# Patient Record
Sex: FEMALE | Race: WHITE | NOT HISPANIC OR LATINO | Employment: FULL TIME | URBAN - METROPOLITAN AREA
[De-identification: names, ages, dates, MRNs, and addresses within clinical notes are randomized per-mention and may not be internally consistent; named-entity substitution may affect disease eponyms.]

---

## 2017-05-19 ENCOUNTER — APPOINTMENT (OUTPATIENT)
Dept: CT IMAGING | Facility: HOSPITAL | Age: 42
End: 2017-05-19

## 2017-05-19 ENCOUNTER — HOSPITAL ENCOUNTER (INPATIENT)
Facility: HOSPITAL | Age: 42
LOS: 3 days | Discharge: HOME | End: 2017-05-22
Attending: EMERGENCY MEDICINE | Admitting: COLON & RECTAL SURGERY

## 2017-05-19 DIAGNOSIS — K50.912: ICD-10-CM

## 2017-05-19 DIAGNOSIS — K56.609 SMALL BOWEL OBSTRUCTION (HCC): Primary | ICD-10-CM

## 2017-05-19 LAB
ALBUMIN SERPL-MCNC: 4.4 G/DL (ref 3.2–4.8)
ALBUMIN/GLOB SERPL: 1.3 G/DL (ref 1.5–2.5)
ALP SERPL-CCNC: 55 U/L (ref 25–100)
ALT SERPL W P-5'-P-CCNC: 82 U/L (ref 7–40)
ANION GAP SERPL CALCULATED.3IONS-SCNC: 11 MMOL/L (ref 3–11)
AST SERPL-CCNC: 95 U/L (ref 0–33)
B-HCG UR QL: NEGATIVE
BACTERIA UR QL AUTO: ABNORMAL /HPF
BASOPHILS # BLD AUTO: 0.01 10*3/MM3 (ref 0–0.2)
BASOPHILS NFR BLD AUTO: 0.2 % (ref 0–1)
BILIRUB SERPL-MCNC: 0.5 MG/DL (ref 0.3–1.2)
BILIRUB UR QL STRIP: NEGATIVE
BUN BLD-MCNC: 14 MG/DL (ref 9–23)
BUN/CREAT SERPL: 23.3 (ref 7–25)
CALCIUM SPEC-SCNC: 10.6 MG/DL (ref 8.7–10.4)
CHLORIDE SERPL-SCNC: 106 MMOL/L (ref 99–109)
CLARITY UR: CLEAR
CO2 SERPL-SCNC: 23 MMOL/L (ref 20–31)
COLOR UR: YELLOW
CREAT BLD-MCNC: 0.6 MG/DL (ref 0.6–1.3)
CRP SERPL-MCNC: 2.28 MG/DL (ref 0–1)
DEPRECATED RDW RBC AUTO: 48.8 FL (ref 37–54)
EOSINOPHIL # BLD AUTO: 0.01 10*3/MM3 (ref 0.1–0.3)
EOSINOPHIL NFR BLD AUTO: 0.2 % (ref 0–3)
ERYTHROCYTE [DISTWIDTH] IN BLOOD BY AUTOMATED COUNT: 13.2 % (ref 11.3–14.5)
ERYTHROCYTE [SEDIMENTATION RATE] IN BLOOD: 17 MM/HR (ref 0–20)
GFR SERPL CREATININE-BSD FRML MDRD: 109 ML/MIN/1.73
GLOBULIN UR ELPH-MCNC: 3.3 GM/DL
GLUCOSE BLD-MCNC: 106 MG/DL (ref 70–100)
GLUCOSE UR STRIP-MCNC: NEGATIVE MG/DL
HCT VFR BLD AUTO: 46.8 % (ref 34.5–44)
HGB BLD-MCNC: 15.2 G/DL (ref 11.5–15.5)
HGB UR QL STRIP.AUTO: NEGATIVE
HOLD SPECIMEN: NORMAL
HOLD SPECIMEN: NORMAL
HYALINE CASTS UR QL AUTO: ABNORMAL /LPF
IMM GRANULOCYTES # BLD: 0.01 10*3/MM3 (ref 0–0.03)
IMM GRANULOCYTES NFR BLD: 0.2 % (ref 0–0.6)
INTERNAL NEGATIVE CONTROL: NEGATIVE
INTERNAL POSITIVE CONTROL: POSITIVE
KETONES UR QL STRIP: ABNORMAL
LEUKOCYTE ESTERASE UR QL STRIP.AUTO: ABNORMAL
LIPASE SERPL-CCNC: 56 U/L (ref 6–51)
LYMPHOCYTES # BLD AUTO: 0.4 10*3/MM3 (ref 0.6–4.8)
LYMPHOCYTES NFR BLD AUTO: 8.3 % (ref 24–44)
Lab: NORMAL
MCH RBC QN AUTO: 32.9 PG (ref 27–31)
MCHC RBC AUTO-ENTMCNC: 32.5 G/DL (ref 32–36)
MCV RBC AUTO: 101.3 FL (ref 80–99)
MONOCYTES # BLD AUTO: 0.44 10*3/MM3 (ref 0–1)
MONOCYTES NFR BLD AUTO: 9.1 % (ref 0–12)
NEUTROPHILS # BLD AUTO: 3.95 10*3/MM3 (ref 1.5–8.3)
NEUTROPHILS NFR BLD AUTO: 82 % (ref 41–71)
NITRITE UR QL STRIP: NEGATIVE
PH UR STRIP.AUTO: <=5 [PH] (ref 5–8)
PLATELET # BLD AUTO: 151 10*3/MM3 (ref 150–450)
PMV BLD AUTO: 10.1 FL (ref 6–12)
POTASSIUM BLD-SCNC: 4.3 MMOL/L (ref 3.5–5.5)
PROT SERPL-MCNC: 7.7 G/DL (ref 5.7–8.2)
PROT UR QL STRIP: NEGATIVE
RBC # BLD AUTO: 4.62 10*6/MM3 (ref 3.89–5.14)
RBC # UR: ABNORMAL /HPF
REF LAB TEST METHOD: ABNORMAL
SODIUM BLD-SCNC: 140 MMOL/L (ref 132–146)
SP GR UR STRIP: 1.02 (ref 1–1.03)
SQUAMOUS #/AREA URNS HPF: ABNORMAL /HPF
UROBILINOGEN UR QL STRIP: ABNORMAL
WBC NRBC COR # BLD: 4.82 10*3/MM3 (ref 3.5–10.8)
WBC UR QL AUTO: ABNORMAL /HPF
WHOLE BLOOD HOLD SPECIMEN: NORMAL
WHOLE BLOOD HOLD SPECIMEN: NORMAL

## 2017-05-19 PROCEDURE — 85652 RBC SED RATE AUTOMATED: CPT | Performed by: COLON & RECTAL SURGERY

## 2017-05-19 PROCEDURE — 25010000002 HEPARIN (PORCINE) PER 1000 UNITS: Performed by: COLON & RECTAL SURGERY

## 2017-05-19 PROCEDURE — 74176 CT ABD & PELVIS W/O CONTRAST: CPT

## 2017-05-19 PROCEDURE — 25010000002 DIAZEPAM PER 5 MG: Performed by: COLON & RECTAL SURGERY

## 2017-05-19 PROCEDURE — 87086 URINE CULTURE/COLONY COUNT: CPT | Performed by: EMERGENCY MEDICINE

## 2017-05-19 PROCEDURE — 80053 COMPREHEN METABOLIC PANEL: CPT | Performed by: EMERGENCY MEDICINE

## 2017-05-19 PROCEDURE — 86140 C-REACTIVE PROTEIN: CPT | Performed by: COLON & RECTAL SURGERY

## 2017-05-19 PROCEDURE — 99284 EMERGENCY DEPT VISIT MOD MDM: CPT

## 2017-05-19 PROCEDURE — 81001 URINALYSIS AUTO W/SCOPE: CPT | Performed by: EMERGENCY MEDICINE

## 2017-05-19 PROCEDURE — 83690 ASSAY OF LIPASE: CPT | Performed by: EMERGENCY MEDICINE

## 2017-05-19 PROCEDURE — 25010000002 HYDROMORPHONE PER 4 MG: Performed by: EMERGENCY MEDICINE

## 2017-05-19 PROCEDURE — 25010000002 MORPHINE SULFATE (PF) 2 MG/ML SOLUTION: Performed by: COLON & RECTAL SURGERY

## 2017-05-19 PROCEDURE — 85025 COMPLETE CBC W/AUTO DIFF WBC: CPT | Performed by: EMERGENCY MEDICINE

## 2017-05-19 PROCEDURE — 87147 CULTURE TYPE IMMUNOLOGIC: CPT | Performed by: EMERGENCY MEDICINE

## 2017-05-19 PROCEDURE — 25010000002 ONDANSETRON PER 1 MG: Performed by: EMERGENCY MEDICINE

## 2017-05-19 RX ORDER — METOPROLOL SUCCINATE 25 MG/1
25 TABLET, EXTENDED RELEASE ORAL DAILY
COMMUNITY

## 2017-05-19 RX ORDER — ACETAMINOPHEN 500 MG
1000 TABLET ORAL 3 TIMES DAILY
Status: DISCONTINUED | OUTPATIENT
Start: 2017-05-19 | End: 2017-05-22 | Stop reason: HOSPADM

## 2017-05-19 RX ORDER — CETIRIZINE HYDROCHLORIDE 10 MG/1
10 TABLET ORAL DAILY
COMMUNITY
End: 2018-08-30

## 2017-05-19 RX ORDER — ESCITALOPRAM OXALATE 10 MG/1
10 TABLET ORAL DAILY
COMMUNITY

## 2017-05-19 RX ORDER — DIAZEPAM 5 MG/1
5 TABLET ORAL EVERY 6 HOURS PRN
Status: DISCONTINUED | OUTPATIENT
Start: 2017-05-19 | End: 2017-05-22 | Stop reason: HOSPADM

## 2017-05-19 RX ORDER — DIAZEPAM 5 MG/ML
5 INJECTION, SOLUTION INTRAMUSCULAR; INTRAVENOUS ONCE
Status: DISCONTINUED | OUTPATIENT
Start: 2017-05-19 | End: 2017-05-19

## 2017-05-19 RX ORDER — RANITIDINE 150 MG/1
150 TABLET ORAL 2 TIMES DAILY
COMMUNITY
End: 2018-08-30

## 2017-05-19 RX ORDER — ONDANSETRON 2 MG/ML
4 INJECTION INTRAMUSCULAR; INTRAVENOUS ONCE
Status: COMPLETED | OUTPATIENT
Start: 2017-05-19 | End: 2017-05-19

## 2017-05-19 RX ORDER — LABETALOL 100 MG/1
100 TABLET, FILM COATED ORAL 2 TIMES DAILY
COMMUNITY
End: 2017-10-06

## 2017-05-19 RX ORDER — BUDESONIDE 3 MG/1
9 CAPSULE, COATED PELLETS ORAL DAILY
Status: DISCONTINUED | OUTPATIENT
Start: 2017-05-19 | End: 2017-05-21

## 2017-05-19 RX ORDER — SODIUM CHLORIDE 0.9 % (FLUSH) 0.9 %
10 SYRINGE (ML) INJECTION AS NEEDED
Status: DISCONTINUED | OUTPATIENT
Start: 2017-05-19 | End: 2017-05-22 | Stop reason: HOSPADM

## 2017-05-19 RX ORDER — MAGNESIUM HYDROXIDE/ALUMINUM HYDROXICE/SIMETHICONE 120; 1200; 1200 MG/30ML; MG/30ML; MG/30ML
10 SUSPENSION ORAL ONCE
Status: DISCONTINUED | OUTPATIENT
Start: 2017-05-19 | End: 2017-05-19

## 2017-05-19 RX ORDER — ONDANSETRON 2 MG/ML
4 INJECTION INTRAMUSCULAR; INTRAVENOUS EVERY 6 HOURS PRN
Status: DISCONTINUED | OUTPATIENT
Start: 2017-05-19 | End: 2017-05-22 | Stop reason: HOSPADM

## 2017-05-19 RX ORDER — HEPARIN SODIUM 5000 [USP'U]/ML
5000 INJECTION, SOLUTION INTRAVENOUS; SUBCUTANEOUS EVERY 8 HOURS SCHEDULED
Status: DISCONTINUED | OUTPATIENT
Start: 2017-05-19 | End: 2017-05-22 | Stop reason: HOSPADM

## 2017-05-19 RX ORDER — DIAZEPAM 5 MG/ML
5 INJECTION, SOLUTION INTRAMUSCULAR; INTRAVENOUS ONCE AS NEEDED
Status: COMPLETED | OUTPATIENT
Start: 2017-05-19 | End: 2017-05-19

## 2017-05-19 RX ORDER — MAGNESIUM HYDROXIDE/ALUMINUM HYDROXICE/SIMETHICONE 120; 1200; 1200 MG/30ML; MG/30ML; MG/30ML
15 SUSPENSION ORAL EVERY 6 HOURS PRN
COMMUNITY
End: 2017-10-06

## 2017-05-19 RX ORDER — SODIUM CHLORIDE 9 MG/ML
100 INJECTION, SOLUTION INTRAVENOUS CONTINUOUS
Status: DISCONTINUED | OUTPATIENT
Start: 2017-05-19 | End: 2017-05-21

## 2017-05-19 RX ORDER — MORPHINE SULFATE 2 MG/ML
2 INJECTION, SOLUTION INTRAMUSCULAR; INTRAVENOUS
Status: DISCONTINUED | OUTPATIENT
Start: 2017-05-19 | End: 2017-05-21

## 2017-05-19 RX ORDER — NALOXONE HCL 0.4 MG/ML
0.4 VIAL (ML) INJECTION
Status: DISCONTINUED | OUTPATIENT
Start: 2017-05-19 | End: 2017-05-21

## 2017-05-19 RX ORDER — MAGNESIUM HYDROXIDE/ALUMINUM HYDROXICE/SIMETHICONE 120; 1200; 1200 MG/30ML; MG/30ML; MG/30ML
30 SUSPENSION ORAL ONCE
Status: COMPLETED | OUTPATIENT
Start: 2017-05-19 | End: 2017-05-19

## 2017-05-19 RX ORDER — SODIUM CHLORIDE 9 MG/ML
100 INJECTION, SOLUTION INTRAVENOUS CONTINUOUS
Status: DISCONTINUED | OUTPATIENT
Start: 2017-05-19 | End: 2017-05-22 | Stop reason: HOSPADM

## 2017-05-19 RX ADMIN — SODIUM CHLORIDE 1000 ML: 9 INJECTION, SOLUTION INTRAVENOUS at 11:12

## 2017-05-19 RX ADMIN — ONDANSETRON 4 MG: 2 INJECTION INTRAMUSCULAR; INTRAVENOUS at 15:30

## 2017-05-19 RX ADMIN — MORPHINE SULFATE 2 MG: 2 INJECTION, SOLUTION INTRAMUSCULAR; INTRAVENOUS at 19:52

## 2017-05-19 RX ADMIN — HYDROMORPHONE HYDROCHLORIDE 1 MG: 1 INJECTION, SOLUTION INTRAMUSCULAR; INTRAVENOUS; SUBCUTANEOUS at 13:38

## 2017-05-19 RX ADMIN — HYDROMORPHONE HYDROCHLORIDE 1 MG: 1 INJECTION, SOLUTION INTRAMUSCULAR; INTRAVENOUS; SUBCUTANEOUS at 21:42

## 2017-05-19 RX ADMIN — HYDROMORPHONE HYDROCHLORIDE 1 MG: 1 INJECTION, SOLUTION INTRAMUSCULAR; INTRAVENOUS; SUBCUTANEOUS at 18:37

## 2017-05-19 RX ADMIN — SODIUM CHLORIDE 100 ML/HR: 9 INJECTION, SOLUTION INTRAVENOUS at 16:00

## 2017-05-19 RX ADMIN — ONDANSETRON 4 MG: 2 INJECTION INTRAMUSCULAR; INTRAVENOUS at 12:04

## 2017-05-19 RX ADMIN — DIAZEPAM 5 MG: 5 INJECTION, SOLUTION INTRAMUSCULAR; INTRAVENOUS at 18:17

## 2017-05-19 RX ADMIN — BUDESONIDE 9 MG: 3 CAPSULE ORAL at 17:43

## 2017-05-19 RX ADMIN — SODIUM CHLORIDE 100 ML/HR: 9 INJECTION, SOLUTION INTRAVENOUS at 17:20

## 2017-05-19 RX ADMIN — HEPARIN SODIUM 5000 UNITS: 5000 INJECTION, SOLUTION INTRAVENOUS; SUBCUTANEOUS at 21:43

## 2017-05-19 RX ADMIN — HYDROMORPHONE HYDROCHLORIDE 1 MG: 1 INJECTION, SOLUTION INTRAMUSCULAR; INTRAVENOUS; SUBCUTANEOUS at 12:07

## 2017-05-19 RX ADMIN — HYDROMORPHONE HYDROCHLORIDE 1 MG: 1 INJECTION, SOLUTION INTRAMUSCULAR; INTRAVENOUS; SUBCUTANEOUS at 15:32

## 2017-05-19 RX ADMIN — SODIUM CHLORIDE 100 ML/HR: 9 INJECTION, SOLUTION INTRAVENOUS at 15:15

## 2017-05-19 RX ADMIN — PHENOL 1 SPRAY: 1.5 LIQUID ORAL at 23:12

## 2017-05-19 RX ADMIN — MORPHINE SULFATE 2 MG: 2 INJECTION, SOLUTION INTRAMUSCULAR; INTRAVENOUS at 23:09

## 2017-05-19 RX ADMIN — ALUMINUM HYDROXIDE, MAGNESIUM HYDROXIDE, AND SIMETHICONE 30 ML: 200; 200; 20 SUSPENSION ORAL at 13:38

## 2017-05-20 LAB
ANION GAP SERPL CALCULATED.3IONS-SCNC: 12 MMOL/L (ref 3–11)
BASOPHILS # BLD AUTO: 0 10*3/MM3 (ref 0–0.2)
BASOPHILS NFR BLD AUTO: 0 % (ref 0–1)
BUN BLD-MCNC: 9 MG/DL (ref 9–23)
BUN/CREAT SERPL: 18 (ref 7–25)
CALCIUM SPEC-SCNC: 9 MG/DL (ref 8.7–10.4)
CHLORIDE SERPL-SCNC: 102 MMOL/L (ref 99–109)
CO2 SERPL-SCNC: 21 MMOL/L (ref 20–31)
CREAT BLD-MCNC: 0.5 MG/DL (ref 0.6–1.3)
DEPRECATED RDW RBC AUTO: 50.3 FL (ref 37–54)
EOSINOPHIL # BLD AUTO: 0.13 10*3/MM3 (ref 0.1–0.3)
EOSINOPHIL NFR BLD AUTO: 4.9 % (ref 0–3)
ERYTHROCYTE [DISTWIDTH] IN BLOOD BY AUTOMATED COUNT: 13.5 % (ref 11.3–14.5)
GFR SERPL CREATININE-BSD FRML MDRD: 135 ML/MIN/1.73
GLUCOSE BLD-MCNC: 80 MG/DL (ref 70–100)
HCT VFR BLD AUTO: 36 % (ref 34.5–44)
HGB BLD-MCNC: 11.3 G/DL (ref 11.5–15.5)
IMM GRANULOCYTES # BLD: 0 10*3/MM3 (ref 0–0.03)
IMM GRANULOCYTES NFR BLD: 0 % (ref 0–0.6)
LYMPHOCYTES # BLD AUTO: 0.87 10*3/MM3 (ref 0.6–4.8)
LYMPHOCYTES NFR BLD AUTO: 32.6 % (ref 24–44)
MCH RBC QN AUTO: 32.7 PG (ref 27–31)
MCHC RBC AUTO-ENTMCNC: 31.4 G/DL (ref 32–36)
MCV RBC AUTO: 104 FL (ref 80–99)
MONOCYTES # BLD AUTO: 0.48 10*3/MM3 (ref 0–1)
MONOCYTES NFR BLD AUTO: 18 % (ref 0–12)
NEUTROPHILS # BLD AUTO: 1.19 10*3/MM3 (ref 1.5–8.3)
NEUTROPHILS NFR BLD AUTO: 44.5 % (ref 41–71)
PLATELET # BLD AUTO: 116 10*3/MM3 (ref 150–450)
PMV BLD AUTO: 9.8 FL (ref 6–12)
POTASSIUM BLD-SCNC: 3.4 MMOL/L (ref 3.5–5.5)
RBC # BLD AUTO: 3.46 10*6/MM3 (ref 3.89–5.14)
SODIUM BLD-SCNC: 135 MMOL/L (ref 132–146)
WBC NRBC COR # BLD: 2.67 10*3/MM3 (ref 3.5–10.8)

## 2017-05-20 PROCEDURE — 25010000002 MORPHINE SULFATE (PF) 2 MG/ML SOLUTION: Performed by: COLON & RECTAL SURGERY

## 2017-05-20 PROCEDURE — 25010000002 HEPARIN (PORCINE) PER 1000 UNITS: Performed by: COLON & RECTAL SURGERY

## 2017-05-20 PROCEDURE — 25010000002 HYDROMORPHONE PER 4 MG: Performed by: EMERGENCY MEDICINE

## 2017-05-20 PROCEDURE — 85025 COMPLETE CBC W/AUTO DIFF WBC: CPT | Performed by: COLON & RECTAL SURGERY

## 2017-05-20 PROCEDURE — 80048 BASIC METABOLIC PNL TOTAL CA: CPT | Performed by: COLON & RECTAL SURGERY

## 2017-05-20 PROCEDURE — 25010000002 METHYLPREDNISOLONE PER 125 MG: Performed by: COLON & RECTAL SURGERY

## 2017-05-20 PROCEDURE — 25010000002 ONDANSETRON PER 1 MG: Performed by: COLON & RECTAL SURGERY

## 2017-05-20 RX ORDER — METHYLPREDNISOLONE SODIUM SUCCINATE 125 MG/2ML
60 INJECTION, POWDER, LYOPHILIZED, FOR SOLUTION INTRAMUSCULAR; INTRAVENOUS EVERY 8 HOURS
Status: DISCONTINUED | OUTPATIENT
Start: 2017-05-20 | End: 2017-05-22 | Stop reason: HOSPADM

## 2017-05-20 RX ADMIN — SODIUM CHLORIDE 100 ML/HR: 9 INJECTION, SOLUTION INTRAVENOUS at 05:57

## 2017-05-20 RX ADMIN — MORPHINE SULFATE 2 MG: 2 INJECTION, SOLUTION INTRAMUSCULAR; INTRAVENOUS at 05:52

## 2017-05-20 RX ADMIN — ACETAMINOPHEN 1000 MG: 500 TABLET ORAL at 21:01

## 2017-05-20 RX ADMIN — SODIUM CHLORIDE 125 MG: 9 INJECTION, SOLUTION INTRAVENOUS at 15:40

## 2017-05-20 RX ADMIN — BUDESONIDE 9 MG: 3 CAPSULE ORAL at 08:33

## 2017-05-20 RX ADMIN — MORPHINE SULFATE 2 MG: 2 INJECTION, SOLUTION INTRAMUSCULAR; INTRAVENOUS at 08:56

## 2017-05-20 RX ADMIN — ACETAMINOPHEN 1000 MG: 500 TABLET ORAL at 08:33

## 2017-05-20 RX ADMIN — ONDANSETRON 4 MG: 2 INJECTION INTRAMUSCULAR; INTRAVENOUS at 21:01

## 2017-05-20 RX ADMIN — PHENOL 1 SPRAY: 1.5 LIQUID ORAL at 08:23

## 2017-05-20 RX ADMIN — HYDROMORPHONE HYDROCHLORIDE 1 MG: 1 INJECTION, SOLUTION INTRAMUSCULAR; INTRAVENOUS; SUBCUTANEOUS at 14:27

## 2017-05-20 RX ADMIN — ACETAMINOPHEN 1000 MG: 500 TABLET ORAL at 15:39

## 2017-05-20 RX ADMIN — HYDROMORPHONE HYDROCHLORIDE 1 MG: 1 INJECTION, SOLUTION INTRAMUSCULAR; INTRAVENOUS; SUBCUTANEOUS at 03:21

## 2017-05-20 RX ADMIN — HEPARIN SODIUM 5000 UNITS: 5000 INJECTION, SOLUTION INTRAVENOUS; SUBCUTANEOUS at 15:39

## 2017-05-20 RX ADMIN — HEPARIN SODIUM 5000 UNITS: 5000 INJECTION, SOLUTION INTRAVENOUS; SUBCUTANEOUS at 21:01

## 2017-05-20 RX ADMIN — HYDROMORPHONE HYDROCHLORIDE 1 MG: 1 INJECTION, SOLUTION INTRAMUSCULAR; INTRAVENOUS; SUBCUTANEOUS at 20:54

## 2017-05-20 RX ADMIN — HYDROMORPHONE HYDROCHLORIDE 1 MG: 1 INJECTION, SOLUTION INTRAMUSCULAR; INTRAVENOUS; SUBCUTANEOUS at 10:26

## 2017-05-20 RX ADMIN — PHENOL 1 SPRAY: 1.5 LIQUID ORAL at 05:52

## 2017-05-20 RX ADMIN — METHYLPREDNISOLONE SODIUM SUCCINATE 60 MG: 125 INJECTION, POWDER, FOR SOLUTION INTRAMUSCULAR; INTRAVENOUS at 21:01

## 2017-05-20 RX ADMIN — MORPHINE SULFATE 2 MG: 2 INJECTION, SOLUTION INTRAMUSCULAR; INTRAVENOUS at 02:14

## 2017-05-20 RX ADMIN — HEPARIN SODIUM 5000 UNITS: 5000 INJECTION, SOLUTION INTRAVENOUS; SUBCUTANEOUS at 05:52

## 2017-05-20 RX ADMIN — SODIUM CHLORIDE 100 ML/HR: 9 INJECTION, SOLUTION INTRAVENOUS at 18:20

## 2017-05-20 RX ADMIN — ERTAPENEM SODIUM 1 G: 1 INJECTION, POWDER, LYOPHILIZED, FOR SOLUTION INTRAMUSCULAR; INTRAVENOUS at 14:27

## 2017-05-20 RX ADMIN — ONDANSETRON 4 MG: 2 INJECTION INTRAMUSCULAR; INTRAVENOUS at 03:22

## 2017-05-20 RX ADMIN — HYDROMORPHONE HYDROCHLORIDE 1 MG: 1 INJECTION, SOLUTION INTRAMUSCULAR; INTRAVENOUS; SUBCUTANEOUS at 23:59

## 2017-05-21 LAB
ANION GAP SERPL CALCULATED.3IONS-SCNC: 7 MMOL/L (ref 3–11)
BACTERIA SPEC AEROBE CULT: ABNORMAL
BACTERIA SPEC AEROBE CULT: ABNORMAL
BUN BLD-MCNC: 5 MG/DL (ref 9–23)
BUN/CREAT SERPL: 10 (ref 7–25)
CALCIUM SPEC-SCNC: 9.4 MG/DL (ref 8.7–10.4)
CHLORIDE SERPL-SCNC: 100 MMOL/L (ref 99–109)
CO2 SERPL-SCNC: 31 MMOL/L (ref 20–31)
CREAT BLD-MCNC: 0.5 MG/DL (ref 0.6–1.3)
DEPRECATED RDW RBC AUTO: 47.3 FL (ref 37–54)
ERYTHROCYTE [DISTWIDTH] IN BLOOD BY AUTOMATED COUNT: 12.8 % (ref 11.3–14.5)
GFR SERPL CREATININE-BSD FRML MDRD: 135 ML/MIN/1.73
GLUCOSE BLD-MCNC: 115 MG/DL (ref 70–100)
HCT VFR BLD AUTO: 36.6 % (ref 34.5–44)
HGB BLD-MCNC: 11.6 G/DL (ref 11.5–15.5)
MCH RBC QN AUTO: 32.3 PG (ref 27–31)
MCHC RBC AUTO-ENTMCNC: 31.7 G/DL (ref 32–36)
MCV RBC AUTO: 101.9 FL (ref 80–99)
PLATELET # BLD AUTO: 137 10*3/MM3 (ref 150–450)
PMV BLD AUTO: 9.8 FL (ref 6–12)
POTASSIUM BLD-SCNC: 3.4 MMOL/L (ref 3.5–5.5)
RBC # BLD AUTO: 3.59 10*6/MM3 (ref 3.89–5.14)
SODIUM BLD-SCNC: 138 MMOL/L (ref 132–146)
STREP GROUPING: ABNORMAL
WBC NRBC COR # BLD: 5.04 10*3/MM3 (ref 3.5–10.8)

## 2017-05-21 PROCEDURE — 85027 COMPLETE CBC AUTOMATED: CPT | Performed by: COLON & RECTAL SURGERY

## 2017-05-21 PROCEDURE — 25010000002 METHYLPREDNISOLONE PER 125 MG: Performed by: COLON & RECTAL SURGERY

## 2017-05-21 PROCEDURE — 25010000002 ONDANSETRON PER 1 MG: Performed by: COLON & RECTAL SURGERY

## 2017-05-21 PROCEDURE — 25010000002 HYDROMORPHONE PER 4 MG: Performed by: EMERGENCY MEDICINE

## 2017-05-21 PROCEDURE — 25010000002 HEPARIN (PORCINE) PER 1000 UNITS: Performed by: COLON & RECTAL SURGERY

## 2017-05-21 PROCEDURE — 80048 BASIC METABOLIC PNL TOTAL CA: CPT | Performed by: COLON & RECTAL SURGERY

## 2017-05-21 RX ADMIN — METHYLPREDNISOLONE SODIUM SUCCINATE 60 MG: 125 INJECTION, POWDER, FOR SOLUTION INTRAMUSCULAR; INTRAVENOUS at 21:11

## 2017-05-21 RX ADMIN — DIAZEPAM 5 MG: 5 TABLET ORAL at 23:25

## 2017-05-21 RX ADMIN — DIAZEPAM 5 MG: 5 TABLET ORAL at 12:00

## 2017-05-21 RX ADMIN — ACETAMINOPHEN 1000 MG: 500 TABLET ORAL at 08:37

## 2017-05-21 RX ADMIN — ONDANSETRON 4 MG: 2 INJECTION INTRAMUSCULAR; INTRAVENOUS at 08:50

## 2017-05-21 RX ADMIN — ACETAMINOPHEN 1000 MG: 500 TABLET ORAL at 21:11

## 2017-05-21 RX ADMIN — HYDROMORPHONE HYDROCHLORIDE 1 MG: 1 INJECTION, SOLUTION INTRAMUSCULAR; INTRAVENOUS; SUBCUTANEOUS at 03:12

## 2017-05-21 RX ADMIN — HYDROMORPHONE HYDROCHLORIDE 1 MG: 1 INJECTION, SOLUTION INTRAMUSCULAR; INTRAVENOUS; SUBCUTANEOUS at 08:37

## 2017-05-21 RX ADMIN — DIAZEPAM 5 MG: 5 TABLET ORAL at 01:03

## 2017-05-21 RX ADMIN — HYDROMORPHONE HYDROCHLORIDE 1 MG: 1 INJECTION, SOLUTION INTRAMUSCULAR; INTRAVENOUS; SUBCUTANEOUS at 05:07

## 2017-05-21 RX ADMIN — HEPARIN SODIUM 5000 UNITS: 5000 INJECTION, SOLUTION INTRAVENOUS; SUBCUTANEOUS at 05:53

## 2017-05-21 RX ADMIN — DIAZEPAM 5 MG: 5 TABLET ORAL at 17:41

## 2017-05-21 RX ADMIN — ACETAMINOPHEN 1000 MG: 500 TABLET ORAL at 16:49

## 2017-05-21 RX ADMIN — METHYLPREDNISOLONE SODIUM SUCCINATE 60 MG: 125 INJECTION, POWDER, FOR SOLUTION INTRAMUSCULAR; INTRAVENOUS at 05:53

## 2017-05-21 RX ADMIN — BUDESONIDE 9 MG: 3 CAPSULE ORAL at 08:37

## 2017-05-21 RX ADMIN — ERTAPENEM SODIUM 1 G: 1 INJECTION, POWDER, LYOPHILIZED, FOR SOLUTION INTRAMUSCULAR; INTRAVENOUS at 16:50

## 2017-05-21 RX ADMIN — HEPARIN SODIUM 5000 UNITS: 5000 INJECTION, SOLUTION INTRAVENOUS; SUBCUTANEOUS at 15:07

## 2017-05-21 RX ADMIN — HEPARIN SODIUM 5000 UNITS: 5000 INJECTION, SOLUTION INTRAVENOUS; SUBCUTANEOUS at 21:11

## 2017-05-21 RX ADMIN — METHYLPREDNISOLONE SODIUM SUCCINATE 60 MG: 125 INJECTION, POWDER, FOR SOLUTION INTRAMUSCULAR; INTRAVENOUS at 15:07

## 2017-05-22 VITALS
WEIGHT: 190.04 LBS | HEART RATE: 86 BPM | OXYGEN SATURATION: 99 % | TEMPERATURE: 98.1 F | SYSTOLIC BLOOD PRESSURE: 169 MMHG | BODY MASS INDEX: 32.44 KG/M2 | HEIGHT: 64 IN | DIASTOLIC BLOOD PRESSURE: 99 MMHG | RESPIRATION RATE: 18 BRPM

## 2017-05-22 PROBLEM — K56.609 SMALL BOWEL OBSTRUCTION: Status: RESOLVED | Noted: 2017-05-19 | Resolved: 2017-05-22

## 2017-05-22 PROCEDURE — 25010000002 HEPARIN (PORCINE) PER 1000 UNITS: Performed by: COLON & RECTAL SURGERY

## 2017-05-22 PROCEDURE — 25010000002 METHYLPREDNISOLONE PER 125 MG: Performed by: COLON & RECTAL SURGERY

## 2017-05-22 RX ORDER — HYDROCODONE BITARTRATE AND ACETAMINOPHEN 7.5; 325 MG/1; MG/1
1 TABLET ORAL EVERY 6 HOURS PRN
Qty: 30 TABLET | Refills: 0 | Status: SHIPPED | OUTPATIENT
Start: 2017-05-22 | End: 2018-08-30

## 2017-05-22 RX ORDER — BUDESONIDE 3 MG/1
9 CAPSULE, COATED PELLETS ORAL 2 TIMES DAILY
Qty: 180 CAPSULE | Refills: 11 | Status: SHIPPED | OUTPATIENT
Start: 2017-05-22 | End: 2017-10-27

## 2017-05-22 RX ADMIN — DIAZEPAM 5 MG: 5 TABLET ORAL at 05:51

## 2017-05-22 RX ADMIN — ACETAMINOPHEN 1000 MG: 500 TABLET ORAL at 09:02

## 2017-05-22 RX ADMIN — HEPARIN SODIUM 5000 UNITS: 5000 INJECTION, SOLUTION INTRAVENOUS; SUBCUTANEOUS at 05:51

## 2017-05-22 RX ADMIN — METHYLPREDNISOLONE SODIUM SUCCINATE 60 MG: 125 INJECTION, POWDER, FOR SOLUTION INTRAMUSCULAR; INTRAVENOUS at 05:51

## 2017-06-15 ENCOUNTER — TRANSCRIBE ORDERS (OUTPATIENT)
Dept: ADMINISTRATIVE | Facility: HOSPITAL | Age: 42
End: 2017-06-15

## 2017-06-15 DIAGNOSIS — K50.90 CROHN'S DISEASE WITHOUT COMPLICATION, UNSPECIFIED GASTROINTESTINAL TRACT LOCATION (HCC): Primary | ICD-10-CM

## 2017-06-16 ENCOUNTER — HOSPITAL ENCOUNTER (OUTPATIENT)
Dept: GENERAL RADIOLOGY | Facility: HOSPITAL | Age: 42
Discharge: HOME | End: 2017-06-16
Attending: COLON & RECTAL SURGERY | Admitting: COLON & RECTAL SURGERY

## 2017-06-16 DIAGNOSIS — K50.90 CROHN'S DISEASE WITHOUT COMPLICATION, UNSPECIFIED GASTROINTESTINAL TRACT LOCATION (HCC): ICD-10-CM

## 2017-06-16 PROCEDURE — 74245: CPT

## 2017-06-16 RX ADMIN — BARIUM SULFATE 500 ML: 960 POWDER, FOR SUSPENSION ORAL at 09:42

## 2017-10-04 PROBLEM — K50.90 CROHN DISEASE: Status: ACTIVE | Noted: 2017-10-04

## 2017-10-04 RX ORDER — CETIRIZINE HYDROCHLORIDE 10 MG/1
10 TABLET ORAL ONCE
Status: CANCELLED
Start: 2017-10-06 | End: 2017-10-06

## 2017-10-04 RX ORDER — ACETAMINOPHEN 500 MG
1000 TABLET ORAL ONCE
Status: CANCELLED | OUTPATIENT
Start: 2017-10-06

## 2017-10-04 RX ORDER — ACETAMINOPHEN 500 MG
1000 TABLET ORAL ONCE
Status: CANCELLED | OUTPATIENT
Start: 2018-01-12

## 2017-10-04 RX ORDER — CETIRIZINE HYDROCHLORIDE 10 MG/1
10 TABLET ORAL ONCE
Status: CANCELLED
Start: 2018-01-12 | End: 2018-01-12

## 2017-10-06 ENCOUNTER — APPOINTMENT (OUTPATIENT)
Dept: ONCOLOGY | Facility: HOSPITAL | Age: 42
End: 2017-10-06

## 2017-10-06 ENCOUNTER — INFUSION (OUTPATIENT)
Dept: ONCOLOGY | Facility: HOSPITAL | Age: 42
End: 2017-10-06

## 2017-10-06 VITALS
DIASTOLIC BLOOD PRESSURE: 75 MMHG | RESPIRATION RATE: 16 BRPM | WEIGHT: 195 LBS | HEART RATE: 85 BPM | TEMPERATURE: 97.2 F | BODY MASS INDEX: 33.47 KG/M2 | SYSTOLIC BLOOD PRESSURE: 112 MMHG

## 2017-10-06 DIAGNOSIS — K50.90 CROHN'S DISEASE WITHOUT COMPLICATION, UNSPECIFIED GASTROINTESTINAL TRACT LOCATION (HCC): Primary | ICD-10-CM

## 2017-10-06 PROCEDURE — 25010000002 INFLIXIMAB PER 10 MG: Performed by: COLON & RECTAL SURGERY

## 2017-10-06 PROCEDURE — 96413 CHEMO IV INFUSION 1 HR: CPT

## 2017-10-06 PROCEDURE — 96415 CHEMO IV INFUSION ADDL HR: CPT

## 2017-10-06 RX ORDER — ACETAMINOPHEN 500 MG
1000 TABLET ORAL ONCE
Status: CANCELLED | OUTPATIENT
Start: 2017-10-06

## 2017-10-06 RX ORDER — CETIRIZINE HYDROCHLORIDE 10 MG/1
10 TABLET ORAL ONCE
Status: DISCONTINUED | OUTPATIENT
Start: 2017-10-06 | End: 2017-10-06 | Stop reason: HOSPADM

## 2017-10-06 RX ORDER — CETIRIZINE HYDROCHLORIDE 10 MG/1
10 TABLET ORAL ONCE
Status: CANCELLED
Start: 2017-10-06 | End: 2017-10-06

## 2017-10-06 RX ORDER — ACETAMINOPHEN 500 MG
1000 TABLET ORAL ONCE
Status: COMPLETED | OUTPATIENT
Start: 2017-10-06 | End: 2017-10-06

## 2017-10-06 RX ADMIN — INFLIXIMAB 500 MG: 100 INJECTION, POWDER, LYOPHILIZED, FOR SOLUTION INTRAVENOUS at 09:10

## 2017-10-06 RX ADMIN — ACETAMINOPHEN 1000 MG: 500 TABLET, FILM COATED ORAL at 09:08

## 2017-10-20 ENCOUNTER — INFUSION (OUTPATIENT)
Dept: ONCOLOGY | Facility: HOSPITAL | Age: 42
End: 2017-10-20

## 2017-10-20 VITALS
HEART RATE: 64 BPM | RESPIRATION RATE: 16 BRPM | WEIGHT: 192 LBS | DIASTOLIC BLOOD PRESSURE: 79 MMHG | SYSTOLIC BLOOD PRESSURE: 123 MMHG | TEMPERATURE: 98.6 F | BODY MASS INDEX: 32.96 KG/M2

## 2017-10-20 DIAGNOSIS — K50.90 CROHN'S DISEASE WITHOUT COMPLICATION, UNSPECIFIED GASTROINTESTINAL TRACT LOCATION (HCC): Primary | ICD-10-CM

## 2017-10-20 PROCEDURE — 96413 CHEMO IV INFUSION 1 HR: CPT

## 2017-10-20 PROCEDURE — 96415 CHEMO IV INFUSION ADDL HR: CPT

## 2017-10-20 PROCEDURE — 25010000002 INFLIXIMAB PER 10 MG: Performed by: COLON & RECTAL SURGERY

## 2017-10-20 RX ORDER — CETIRIZINE HYDROCHLORIDE 10 MG/1
10 TABLET ORAL ONCE
Status: COMPLETED | OUTPATIENT
Start: 2017-10-20 | End: 2017-10-20

## 2017-10-20 RX ORDER — ACETAMINOPHEN 500 MG
1000 TABLET ORAL ONCE
Status: COMPLETED | OUTPATIENT
Start: 2017-10-20 | End: 2017-10-20

## 2017-10-20 RX ORDER — CETIRIZINE HYDROCHLORIDE 10 MG/1
10 TABLET ORAL ONCE
Status: CANCELLED
Start: 2017-10-20 | End: 2017-10-20

## 2017-10-20 RX ORDER — ACETAMINOPHEN 500 MG
1000 TABLET ORAL ONCE
Status: CANCELLED | OUTPATIENT
Start: 2017-10-20

## 2017-10-20 RX ADMIN — ACETAMINOPHEN 1000 MG: 500 TABLET ORAL at 09:59

## 2017-10-20 RX ADMIN — INFLIXIMAB 500 MG: 100 INJECTION, POWDER, LYOPHILIZED, FOR SOLUTION INTRAVENOUS at 10:05

## 2017-10-20 RX ADMIN — CETIRIZINE HYDROCHLORIDE 10 MG: 10 TABLET, FILM COATED ORAL at 09:59

## 2017-10-27 ENCOUNTER — APPOINTMENT (OUTPATIENT)
Dept: PREADMISSION TESTING | Facility: HOSPITAL | Age: 42
End: 2017-10-27

## 2017-10-27 ENCOUNTER — HOSPITAL ENCOUNTER (OUTPATIENT)
Dept: GENERAL RADIOLOGY | Facility: HOSPITAL | Age: 42
Discharge: HOME | End: 2017-10-27
Admitting: COLON & RECTAL SURGERY

## 2017-10-27 VITALS — BODY MASS INDEX: 33.61 KG/M2 | HEIGHT: 64 IN | WEIGHT: 196.87 LBS

## 2017-10-27 LAB
ALBUMIN SERPL-MCNC: 3.8 G/DL (ref 3.2–4.8)
ALBUMIN/GLOB SERPL: 1.4 G/DL (ref 1.5–2.5)
ALP SERPL-CCNC: 73 U/L (ref 25–100)
ALT SERPL W P-5'-P-CCNC: 101 U/L (ref 7–40)
ANION GAP SERPL CALCULATED.3IONS-SCNC: 15 MMOL/L (ref 3–11)
AST SERPL-CCNC: 116 U/L (ref 0–33)
BILIRUB SERPL-MCNC: 0.4 MG/DL (ref 0.3–1.2)
BUN BLD-MCNC: 8 MG/DL (ref 9–23)
BUN/CREAT SERPL: 13.3 (ref 7–25)
CALCIUM SPEC-SCNC: 8.9 MG/DL (ref 8.7–10.4)
CHLORIDE SERPL-SCNC: 105 MMOL/L (ref 99–109)
CO2 SERPL-SCNC: 20 MMOL/L (ref 20–31)
CREAT BLD-MCNC: 0.6 MG/DL (ref 0.6–1.3)
DEPRECATED RDW RBC AUTO: 51.3 FL (ref 37–54)
ERYTHROCYTE [DISTWIDTH] IN BLOOD BY AUTOMATED COUNT: 13.7 % (ref 11.3–14.5)
GFR SERPL CREATININE-BSD FRML MDRD: 109 ML/MIN/1.73
GLOBULIN UR ELPH-MCNC: 2.8 GM/DL
GLUCOSE BLD-MCNC: 84 MG/DL (ref 70–100)
HBA1C MFR BLD: 4.6 % (ref 4.8–5.6)
HCT VFR BLD AUTO: 38 % (ref 34.5–44)
HGB BLD-MCNC: 12.6 G/DL (ref 11.5–15.5)
MCH RBC QN AUTO: 33.8 PG (ref 27–31)
MCHC RBC AUTO-ENTMCNC: 33.2 G/DL (ref 32–36)
MCV RBC AUTO: 101.9 FL (ref 80–99)
PLATELET # BLD AUTO: 185 10*3/MM3 (ref 150–450)
PMV BLD AUTO: 9.9 FL (ref 6–12)
POTASSIUM BLD-SCNC: 4.7 MMOL/L (ref 3.5–5.5)
PROT SERPL-MCNC: 6.6 G/DL (ref 5.7–8.2)
RBC # BLD AUTO: 3.73 10*6/MM3 (ref 3.89–5.14)
SODIUM BLD-SCNC: 140 MMOL/L (ref 132–146)
WBC NRBC COR # BLD: 3.75 10*3/MM3 (ref 3.5–10.8)

## 2017-10-27 PROCEDURE — 93005 ELECTROCARDIOGRAM TRACING: CPT

## 2017-10-27 PROCEDURE — 36415 COLL VENOUS BLD VENIPUNCTURE: CPT

## 2017-10-27 PROCEDURE — 71020 HC CHEST PA AND LATERAL: CPT

## 2017-10-27 PROCEDURE — 85027 COMPLETE CBC AUTOMATED: CPT | Performed by: COLON & RECTAL SURGERY

## 2017-10-27 PROCEDURE — 80053 COMPREHEN METABOLIC PANEL: CPT | Performed by: COLON & RECTAL SURGERY

## 2017-10-27 PROCEDURE — 83036 HEMOGLOBIN GLYCOSYLATED A1C: CPT | Performed by: COLON & RECTAL SURGERY

## 2017-10-27 RX ORDER — IBUPROFEN 400 MG/1
400 TABLET ORAL EVERY 6 HOURS PRN
COMMUNITY

## 2017-11-06 ENCOUNTER — ANESTHESIA EVENT (OUTPATIENT)
Dept: PERIOP | Facility: HOSPITAL | Age: 42
End: 2017-11-06

## 2017-11-06 ENCOUNTER — ANESTHESIA (OUTPATIENT)
Dept: PERIOP | Facility: HOSPITAL | Age: 42
End: 2017-11-06

## 2017-11-06 ENCOUNTER — HOSPITAL ENCOUNTER (INPATIENT)
Facility: HOSPITAL | Age: 42
LOS: 7 days | Discharge: HOME | End: 2017-11-13
Attending: COLON & RECTAL SURGERY | Admitting: INTERNAL MEDICINE

## 2017-11-06 DIAGNOSIS — K50.90 CROHN DISEASE (HCC): ICD-10-CM

## 2017-11-06 LAB
ABO GROUP BLD: NORMAL
ABO GROUP BLD: NORMAL
B-HCG UR QL: NEGATIVE
BLD GP AB SCN SERPL QL: NEGATIVE
HCT VFR BLD AUTO: 29.3 % (ref 34.5–44)
HCT VFR BLD AUTO: 30.7 % (ref 34.5–44)
HGB BLD-MCNC: 10.1 G/DL (ref 11.5–15.5)
HGB BLD-MCNC: 9.9 G/DL (ref 11.5–15.5)
INTERNAL NEGATIVE CONTROL: NORMAL
INTERNAL POSITIVE CONTROL: REACTIVE
Lab: NORMAL
POTASSIUM BLDA-SCNC: 4.05 MMOL/L (ref 3.5–5.3)
RH BLD: POSITIVE
RH BLD: POSITIVE

## 2017-11-06 PROCEDURE — 85014 HEMATOCRIT: CPT | Performed by: NURSE ANESTHETIST, CERTIFIED REGISTERED

## 2017-11-06 PROCEDURE — 25010000002 HYDROMORPHONE PER 4 MG: Performed by: COLON & RECTAL SURGERY

## 2017-11-06 PROCEDURE — 25010000002 FENTANYL CITRATE (PF) 100 MCG/2ML SOLUTION: Performed by: NURSE ANESTHETIST, CERTIFIED REGISTERED

## 2017-11-06 PROCEDURE — 25010000002 PROPOFOL 10 MG/ML EMULSION: Performed by: NURSE ANESTHETIST, CERTIFIED REGISTERED

## 2017-11-06 PROCEDURE — 25010000002 PROPOFOL 1000 MG/ML EMULSION: Performed by: NURSE ANESTHETIST, CERTIFIED REGISTERED

## 2017-11-06 PROCEDURE — 25010000002 DEXAMETHASONE PER 1 MG: Performed by: NURSE ANESTHETIST, CERTIFIED REGISTERED

## 2017-11-06 PROCEDURE — 0DTH0ZZ RESECTION OF CECUM, OPEN APPROACH: ICD-10-PCS | Performed by: COLON & RECTAL SURGERY

## 2017-11-06 PROCEDURE — 84132 ASSAY OF SERUM POTASSIUM: CPT | Performed by: ANESTHESIOLOGY

## 2017-11-06 PROCEDURE — 25010000002 ALBUMIN HUMAN 5% PER 50 ML: Performed by: NURSE ANESTHETIST, CERTIFIED REGISTERED

## 2017-11-06 PROCEDURE — 85018 HEMOGLOBIN: CPT | Performed by: NURSE ANESTHETIST, CERTIFIED REGISTERED

## 2017-11-06 PROCEDURE — P9041 ALBUMIN (HUMAN),5%, 50ML: HCPCS | Performed by: NURSE ANESTHETIST, CERTIFIED REGISTERED

## 2017-11-06 PROCEDURE — 86901 BLOOD TYPING SEROLOGIC RH(D): CPT | Performed by: NURSE ANESTHETIST, CERTIFIED REGISTERED

## 2017-11-06 PROCEDURE — 0UT70ZZ RESECTION OF BILATERAL FALLOPIAN TUBES, OPEN APPROACH: ICD-10-PCS | Performed by: COLON & RECTAL SURGERY

## 2017-11-06 PROCEDURE — 25010000002 MIDAZOLAM PER 1 MG: Performed by: ANESTHESIOLOGY

## 2017-11-06 PROCEDURE — 86850 RBC ANTIBODY SCREEN: CPT | Performed by: NURSE ANESTHETIST, CERTIFIED REGISTERED

## 2017-11-06 PROCEDURE — 25010000002 HEPARIN (PORCINE) PER 1000 UNITS: Performed by: COLON & RECTAL SURGERY

## 2017-11-06 PROCEDURE — 86923 COMPATIBILITY TEST ELECTRIC: CPT

## 2017-11-06 PROCEDURE — 86900 BLOOD TYPING SEROLOGIC ABO: CPT | Performed by: NURSE ANESTHETIST, CERTIFIED REGISTERED

## 2017-11-06 PROCEDURE — 85014 HEMATOCRIT: CPT | Performed by: COLON & RECTAL SURGERY

## 2017-11-06 PROCEDURE — 25010000002 PHENYLEPHRINE PER 1 ML: Performed by: NURSE ANESTHETIST, CERTIFIED REGISTERED

## 2017-11-06 PROCEDURE — 86901 BLOOD TYPING SEROLOGIC RH(D): CPT

## 2017-11-06 PROCEDURE — 88307 TISSUE EXAM BY PATHOLOGIST: CPT | Performed by: COLON & RECTAL SURGERY

## 2017-11-06 PROCEDURE — 25010000002 MIDAZOLAM PER 1 MG: Performed by: NURSE ANESTHETIST, CERTIFIED REGISTERED

## 2017-11-06 PROCEDURE — 86900 BLOOD TYPING SEROLOGIC ABO: CPT

## 2017-11-06 PROCEDURE — 85018 HEMOGLOBIN: CPT | Performed by: COLON & RECTAL SURGERY

## 2017-11-06 PROCEDURE — 0UT20ZZ RESECTION OF BILATERAL OVARIES, OPEN APPROACH: ICD-10-PCS | Performed by: COLON & RECTAL SURGERY

## 2017-11-06 PROCEDURE — 25010000002 NEOSTIGMINE PER 0.5 MG: Performed by: NURSE ANESTHETIST, CERTIFIED REGISTERED

## 2017-11-06 RX ORDER — DIAZEPAM 5 MG/1
5 TABLET ORAL EVERY 6 HOURS PRN
Status: DISCONTINUED | OUTPATIENT
Start: 2017-11-06 | End: 2017-11-13 | Stop reason: HOSPADM

## 2017-11-06 RX ORDER — MAGNESIUM HYDROXIDE 1200 MG/15ML
LIQUID ORAL AS NEEDED
Status: DISCONTINUED | OUTPATIENT
Start: 2017-11-06 | End: 2017-11-06 | Stop reason: HOSPADM

## 2017-11-06 RX ORDER — ONDANSETRON 2 MG/ML
4 INJECTION INTRAMUSCULAR; INTRAVENOUS ONCE AS NEEDED
Status: DISCONTINUED | OUTPATIENT
Start: 2017-11-06 | End: 2017-11-06 | Stop reason: HOSPADM

## 2017-11-06 RX ORDER — HYDROMORPHONE HYDROCHLORIDE 1 MG/ML
0.5 INJECTION, SOLUTION INTRAMUSCULAR; INTRAVENOUS; SUBCUTANEOUS
Status: DISCONTINUED | OUTPATIENT
Start: 2017-11-06 | End: 2017-11-07

## 2017-11-06 RX ORDER — ESCITALOPRAM OXALATE 10 MG/1
10 TABLET ORAL DAILY
Status: DISCONTINUED | OUTPATIENT
Start: 2017-11-07 | End: 2017-11-13 | Stop reason: HOSPADM

## 2017-11-06 RX ORDER — ATRACURIUM BESYLATE 10 MG/ML
INJECTION, SOLUTION INTRAVENOUS AS NEEDED
Status: DISCONTINUED | OUTPATIENT
Start: 2017-11-06 | End: 2017-11-06 | Stop reason: SURG

## 2017-11-06 RX ORDER — FENTANYL CITRATE 50 UG/ML
INJECTION, SOLUTION INTRAMUSCULAR; INTRAVENOUS AS NEEDED
Status: DISCONTINUED | OUTPATIENT
Start: 2017-11-06 | End: 2017-11-06 | Stop reason: SURG

## 2017-11-06 RX ORDER — NEOMYCIN SULFATE 500 MG/1
1000 TABLET ORAL EVERY 12 HOURS
COMMUNITY
End: 2017-11-13 | Stop reason: HOSPADM

## 2017-11-06 RX ORDER — ACETAMINOPHEN 500 MG
1000 TABLET ORAL ONCE
Status: COMPLETED | OUTPATIENT
Start: 2017-11-06 | End: 2017-11-06

## 2017-11-06 RX ORDER — NALOXONE HCL 0.4 MG/ML
0.4 VIAL (ML) INJECTION
Status: DISCONTINUED | OUTPATIENT
Start: 2017-11-06 | End: 2017-11-07

## 2017-11-06 RX ORDER — PROMETHAZINE HYDROCHLORIDE 25 MG/1
25 SUPPOSITORY RECTAL ONCE AS NEEDED
Status: DISCONTINUED | OUTPATIENT
Start: 2017-11-06 | End: 2017-11-06 | Stop reason: HOSPADM

## 2017-11-06 RX ORDER — OXYCODONE AND ACETAMINOPHEN 7.5; 325 MG/1; MG/1
1 TABLET ORAL EVERY 4 HOURS PRN
Status: DISCONTINUED | OUTPATIENT
Start: 2017-11-06 | End: 2017-11-08

## 2017-11-06 RX ORDER — CETIRIZINE HYDROCHLORIDE 10 MG/1
10 TABLET ORAL DAILY
Status: DISCONTINUED | OUTPATIENT
Start: 2017-11-07 | End: 2017-11-13 | Stop reason: HOSPADM

## 2017-11-06 RX ORDER — LABETALOL HYDROCHLORIDE 5 MG/ML
INJECTION, SOLUTION INTRAVENOUS AS NEEDED
Status: DISCONTINUED | OUTPATIENT
Start: 2017-11-06 | End: 2017-11-06 | Stop reason: SURG

## 2017-11-06 RX ORDER — ONDANSETRON 2 MG/ML
4 INJECTION INTRAMUSCULAR; INTRAVENOUS EVERY 6 HOURS PRN
Status: DISCONTINUED | OUTPATIENT
Start: 2017-11-06 | End: 2017-11-13 | Stop reason: HOSPADM

## 2017-11-06 RX ORDER — MIDAZOLAM HYDROCHLORIDE 1 MG/ML
2 INJECTION INTRAMUSCULAR; INTRAVENOUS ONCE
Status: COMPLETED | OUTPATIENT
Start: 2017-11-06 | End: 2017-11-06

## 2017-11-06 RX ORDER — HEPARIN SODIUM 5000 [USP'U]/ML
5000 INJECTION, SOLUTION INTRAVENOUS; SUBCUTANEOUS EVERY 8 HOURS SCHEDULED
Status: DISCONTINUED | OUTPATIENT
Start: 2017-11-07 | End: 2017-11-09

## 2017-11-06 RX ORDER — METRONIDAZOLE 250 MG/1
500 TABLET ORAL EVERY 12 HOURS
COMMUNITY
End: 2017-11-13 | Stop reason: HOSPADM

## 2017-11-06 RX ORDER — LIDOCAINE HYDROCHLORIDE 10 MG/ML
0.5 INJECTION, SOLUTION EPIDURAL; INFILTRATION; INTRACAUDAL; PERINEURAL ONCE AS NEEDED
Status: COMPLETED | OUTPATIENT
Start: 2017-11-06 | End: 2017-11-06

## 2017-11-06 RX ORDER — LIDOCAINE HYDROCHLORIDE 40 MG/ML
SOLUTION TOPICAL AS NEEDED
Status: DISCONTINUED | OUTPATIENT
Start: 2017-11-06 | End: 2017-11-06 | Stop reason: SURG

## 2017-11-06 RX ORDER — SCOLOPAMINE TRANSDERMAL SYSTEM 1 MG/1
1 PATCH, EXTENDED RELEASE TRANSDERMAL ONCE
Status: COMPLETED | OUTPATIENT
Start: 2017-11-06 | End: 2017-11-09

## 2017-11-06 RX ORDER — SODIUM CHLORIDE 0.9 % (FLUSH) 0.9 %
1-10 SYRINGE (ML) INJECTION AS NEEDED
Status: DISCONTINUED | OUTPATIENT
Start: 2017-11-06 | End: 2017-11-06 | Stop reason: HOSPADM

## 2017-11-06 RX ORDER — ALBUMIN, HUMAN INJ 5% 5 %
SOLUTION INTRAVENOUS CONTINUOUS PRN
Status: DISCONTINUED | OUTPATIENT
Start: 2017-11-06 | End: 2017-11-06 | Stop reason: SURG

## 2017-11-06 RX ORDER — PROMETHAZINE HYDROCHLORIDE 25 MG/ML
6.25 INJECTION, SOLUTION INTRAMUSCULAR; INTRAVENOUS ONCE AS NEEDED
Status: DISCONTINUED | OUTPATIENT
Start: 2017-11-06 | End: 2017-11-06 | Stop reason: HOSPADM

## 2017-11-06 RX ORDER — FLUTICASONE PROPIONATE 50 MCG
2 SPRAY, SUSPENSION (ML) NASAL DAILY
Status: DISCONTINUED | OUTPATIENT
Start: 2017-11-06 | End: 2017-11-13 | Stop reason: HOSPADM

## 2017-11-06 RX ORDER — ALVIMOPAN 12 MG/1
12 CAPSULE ORAL ONCE
Status: COMPLETED | OUTPATIENT
Start: 2017-11-06 | End: 2017-11-06

## 2017-11-06 RX ORDER — GABAPENTIN 300 MG/1
600 CAPSULE ORAL 2 TIMES DAILY
Status: COMPLETED | OUTPATIENT
Start: 2017-11-06 | End: 2017-11-09

## 2017-11-06 RX ORDER — HEPARIN SODIUM 5000 [USP'U]/ML
5000 INJECTION, SOLUTION INTRAVENOUS; SUBCUTANEOUS ONCE
Status: COMPLETED | OUTPATIENT
Start: 2017-11-06 | End: 2017-11-06

## 2017-11-06 RX ORDER — PREGABALIN 75 MG/1
75 CAPSULE ORAL ONCE
Status: COMPLETED | OUTPATIENT
Start: 2017-11-06 | End: 2017-11-06

## 2017-11-06 RX ORDER — CELECOXIB 200 MG/1
400 CAPSULE ORAL ONCE
Status: COMPLETED | OUTPATIENT
Start: 2017-11-06 | End: 2017-11-06

## 2017-11-06 RX ORDER — FAMOTIDINE 10 MG/ML
20 INJECTION, SOLUTION INTRAVENOUS ONCE
Status: CANCELLED | OUTPATIENT
Start: 2017-11-06 | End: 2017-11-06

## 2017-11-06 RX ORDER — ALVIMOPAN 12 MG/1
12 CAPSULE ORAL 2 TIMES DAILY
Status: DISCONTINUED | OUTPATIENT
Start: 2017-11-07 | End: 2017-11-10

## 2017-11-06 RX ORDER — FAMOTIDINE 20 MG/1
20 TABLET, FILM COATED ORAL 2 TIMES DAILY
Status: DISCONTINUED | OUTPATIENT
Start: 2017-11-06 | End: 2017-11-13 | Stop reason: HOSPADM

## 2017-11-06 RX ORDER — SODIUM CHLORIDE 9 MG/ML
75 INJECTION, SOLUTION INTRAVENOUS ONCE
Status: COMPLETED | OUTPATIENT
Start: 2017-11-06 | End: 2017-11-06

## 2017-11-06 RX ORDER — PROPOFOL 10 MG/ML
VIAL (ML) INTRAVENOUS AS NEEDED
Status: DISCONTINUED | OUTPATIENT
Start: 2017-11-06 | End: 2017-11-06 | Stop reason: SURG

## 2017-11-06 RX ORDER — FAMOTIDINE 20 MG/1
20 TABLET, FILM COATED ORAL ONCE
Status: COMPLETED | OUTPATIENT
Start: 2017-11-06 | End: 2017-11-06

## 2017-11-06 RX ORDER — SODIUM CHLORIDE, SODIUM LACTATE, POTASSIUM CHLORIDE, CALCIUM CHLORIDE 600; 310; 30; 20 MG/100ML; MG/100ML; MG/100ML; MG/100ML
9 INJECTION, SOLUTION INTRAVENOUS CONTINUOUS
Status: DISCONTINUED | OUTPATIENT
Start: 2017-11-06 | End: 2017-11-09

## 2017-11-06 RX ORDER — PROMETHAZINE HYDROCHLORIDE 25 MG/1
25 TABLET ORAL ONCE AS NEEDED
Status: DISCONTINUED | OUTPATIENT
Start: 2017-11-06 | End: 2017-11-06 | Stop reason: HOSPADM

## 2017-11-06 RX ORDER — ACETAMINOPHEN 500 MG
1000 TABLET ORAL 3 TIMES DAILY
Status: DISCONTINUED | OUTPATIENT
Start: 2017-11-06 | End: 2017-11-13 | Stop reason: HOSPADM

## 2017-11-06 RX ORDER — DEXAMETHASONE SODIUM PHOSPHATE 10 MG/ML
INJECTION INTRAMUSCULAR; INTRAVENOUS AS NEEDED
Status: DISCONTINUED | OUTPATIENT
Start: 2017-11-06 | End: 2017-11-06 | Stop reason: SURG

## 2017-11-06 RX ORDER — MIDAZOLAM HYDROCHLORIDE 1 MG/ML
INJECTION INTRAMUSCULAR; INTRAVENOUS AS NEEDED
Status: DISCONTINUED | OUTPATIENT
Start: 2017-11-06 | End: 2017-11-06 | Stop reason: SURG

## 2017-11-06 RX ORDER — LIDOCAINE HYDROCHLORIDE 10 MG/ML
INJECTION, SOLUTION INFILTRATION; PERINEURAL AS NEEDED
Status: DISCONTINUED | OUTPATIENT
Start: 2017-11-06 | End: 2017-11-06 | Stop reason: SURG

## 2017-11-06 RX ORDER — GLYCOPYRROLATE 0.2 MG/ML
INJECTION INTRAMUSCULAR; INTRAVENOUS AS NEEDED
Status: DISCONTINUED | OUTPATIENT
Start: 2017-11-06 | End: 2017-11-06 | Stop reason: SURG

## 2017-11-06 RX ORDER — HYDROMORPHONE HYDROCHLORIDE 1 MG/ML
0.5 INJECTION, SOLUTION INTRAMUSCULAR; INTRAVENOUS; SUBCUTANEOUS
Status: DISCONTINUED | OUTPATIENT
Start: 2017-11-06 | End: 2017-11-06 | Stop reason: HOSPADM

## 2017-11-06 RX ORDER — FENTANYL CITRATE 50 UG/ML
50 INJECTION, SOLUTION INTRAMUSCULAR; INTRAVENOUS
Status: DISCONTINUED | OUTPATIENT
Start: 2017-11-06 | End: 2017-11-06 | Stop reason: HOSPADM

## 2017-11-06 RX ADMIN — ACETAMINOPHEN 1000 MG: 500 TABLET ORAL at 12:16

## 2017-11-06 RX ADMIN — ALBUMIN HUMAN: 0.05 INJECTION, SOLUTION INTRAVENOUS at 15:14

## 2017-11-06 RX ADMIN — FENTANYL CITRATE 50 MCG: 50 INJECTION, SOLUTION INTRAMUSCULAR; INTRAVENOUS at 14:05

## 2017-11-06 RX ADMIN — PHENYLEPHRINE HYDROCHLORIDE 1 MCG/KG/MIN: 10 INJECTION INTRAVENOUS at 15:10

## 2017-11-06 RX ADMIN — MIDAZOLAM HYDROCHLORIDE 2 MG: 1 INJECTION, SOLUTION INTRAMUSCULAR; INTRAVENOUS at 13:36

## 2017-11-06 RX ADMIN — GABAPENTIN 600 MG: 300 CAPSULE ORAL at 20:39

## 2017-11-06 RX ADMIN — FAMOTIDINE 20 MG: 20 TABLET, FILM COATED ORAL at 20:38

## 2017-11-06 RX ADMIN — FENTANYL CITRATE 50 MCG: 50 INJECTION INTRAMUSCULAR; INTRAVENOUS at 16:15

## 2017-11-06 RX ADMIN — ACETAMINOPHEN 1000 MG: 500 TABLET ORAL at 20:39

## 2017-11-06 RX ADMIN — LIDOCAINE HYDROCHLORIDE 0.2 ML: 10 INJECTION, SOLUTION EPIDURAL; INFILTRATION; INTRACAUDAL; PERINEURAL at 11:58

## 2017-11-06 RX ADMIN — ATRACURIUM BESYLATE 10 MG: 10 INJECTION, SOLUTION INTRAVENOUS at 14:00

## 2017-11-06 RX ADMIN — ERTAPENEM SODIUM 1 G: 1 INJECTION, POWDER, LYOPHILIZED, FOR SOLUTION INTRAMUSCULAR; INTRAVENOUS at 13:38

## 2017-11-06 RX ADMIN — FENTANYL CITRATE 100 MCG: 50 INJECTION, SOLUTION INTRAMUSCULAR; INTRAVENOUS at 13:38

## 2017-11-06 RX ADMIN — ALVIMOPAN 12 MG: 12 CAPSULE ORAL at 12:16

## 2017-11-06 RX ADMIN — HYDROMORPHONE HYDROCHLORIDE 0.5 MG: 1 INJECTION, SOLUTION INTRAMUSCULAR; INTRAVENOUS; SUBCUTANEOUS at 20:39

## 2017-11-06 RX ADMIN — MIDAZOLAM HYDROCHLORIDE 2 MG: 1 INJECTION, SOLUTION INTRAMUSCULAR; INTRAVENOUS at 12:33

## 2017-11-06 RX ADMIN — GLYCOPYRROLATE 0.8 MG: 0.2 INJECTION, SOLUTION INTRAMUSCULAR; INTRAVENOUS at 15:52

## 2017-11-06 RX ADMIN — ATRACURIUM BESYLATE 20 MG: 10 INJECTION, SOLUTION INTRAVENOUS at 14:40

## 2017-11-06 RX ADMIN — ATRACURIUM BESYLATE 10 MG: 10 INJECTION, SOLUTION INTRAVENOUS at 15:19

## 2017-11-06 RX ADMIN — DEXAMETHASONE SODIUM PHOSPHATE 8 MG: 10 INJECTION INTRAMUSCULAR; INTRAVENOUS at 13:38

## 2017-11-06 RX ADMIN — PHENYLEPHRINE HYDROCHLORIDE 100 MCG: 10 INJECTION INTRAVENOUS at 14:50

## 2017-11-06 RX ADMIN — Medication 5 MG: at 15:53

## 2017-11-06 RX ADMIN — SODIUM CHLORIDE, POTASSIUM CHLORIDE, SODIUM LACTATE AND CALCIUM CHLORIDE: 600; 310; 30; 20 INJECTION, SOLUTION INTRAVENOUS at 13:37

## 2017-11-06 RX ADMIN — LIDOCAINE HYDROCHLORIDE 1 EACH: 40 SOLUTION TOPICAL at 13:43

## 2017-11-06 RX ADMIN — PREGABALIN 75 MG: 75 CAPSULE ORAL at 12:16

## 2017-11-06 RX ADMIN — ATRACURIUM BESYLATE 40 MG: 10 INJECTION, SOLUTION INTRAVENOUS at 13:42

## 2017-11-06 RX ADMIN — SODIUM CHLORIDE 1000 ML: 9 INJECTION, SOLUTION INTRAVENOUS at 11:58

## 2017-11-06 RX ADMIN — ALBUMIN HUMAN: 0.05 INJECTION, SOLUTION INTRAVENOUS at 14:55

## 2017-11-06 RX ADMIN — FENTANYL CITRATE 50 MCG: 50 INJECTION INTRAMUSCULAR; INTRAVENOUS at 16:50

## 2017-11-06 RX ADMIN — PHENYLEPHRINE HYDROCHLORIDE 100 MCG: 10 INJECTION INTRAVENOUS at 14:55

## 2017-11-06 RX ADMIN — FENTANYL CITRATE 50 MCG: 50 INJECTION, SOLUTION INTRAMUSCULAR; INTRAVENOUS at 14:15

## 2017-11-06 RX ADMIN — SODIUM CHLORIDE 75 ML/HR: 9 INJECTION, SOLUTION INTRAVENOUS at 22:09

## 2017-11-06 RX ADMIN — SODIUM CHLORIDE, POTASSIUM CHLORIDE, SODIUM LACTATE AND CALCIUM CHLORIDE 9 ML/HR: 600; 310; 30; 20 INJECTION, SOLUTION INTRAVENOUS at 12:21

## 2017-11-06 RX ADMIN — SCOPALAMINE 1 PATCH: 1 PATCH, EXTENDED RELEASE TRANSDERMAL at 12:18

## 2017-11-06 RX ADMIN — LIDOCAINE HYDROCHLORIDE 100 MG: 10 INJECTION, SOLUTION INFILTRATION; PERINEURAL at 13:42

## 2017-11-06 RX ADMIN — CELECOXIB 400 MG: 200 CAPSULE ORAL at 12:16

## 2017-11-06 RX ADMIN — PROPOFOL 150 MG: 10 INJECTION, EMULSION INTRAVENOUS at 13:42

## 2017-11-06 RX ADMIN — HEPARIN SODIUM 5000 UNITS: 5000 INJECTION, SOLUTION INTRAVENOUS; SUBCUTANEOUS at 12:17

## 2017-11-06 RX ADMIN — DIAZEPAM 5 MG: 5 TABLET ORAL at 23:13

## 2017-11-06 RX ADMIN — PROPOFOL 50 MCG/KG/MIN: 10 INJECTION, EMULSION INTRAVENOUS at 13:40

## 2017-11-06 RX ADMIN — FAMOTIDINE 20 MG: 20 TABLET, FILM COATED ORAL at 12:16

## 2017-11-06 RX ADMIN — LABETALOL HYDROCHLORIDE 10 MG: 5 INJECTION, SOLUTION INTRAVENOUS at 14:20

## 2017-11-06 NOTE — OP NOTE
Colon and Rectal [CSGA]    COLON RESECTION RIGHT  Procedure Note    Gloria Smith  11/6/2017    Pre-op Diagnosis:   Crohn's disease with ileal stricture    Post-op Diagnosis:     Crohn's disease with involvement of ileum and cecum.  Uterine fibroids    Procedure(s):  ILEOCECOTOMY, BILATERAL SALPINGO OOPHERECTOMY, POSSIBLE LEFT COLECTOMY PARTIAL     Surgeon(s):  Ajith Mcduffie MD    Anesthesia: General    Staff:   Circulator: Lisa Corbett RN; Delilah Leung RN  Scrub Person: Yi Landeros; Hao Benitez  Assistant: ELIUD Finch    Estimated Blood Loss: 1000 mL    Specimens:                  Order Name Source Comment Collection Info Order Time   OR POTASSIUM   Collected By: Paulette Doan RN 11/6/2017 11:39 AM   HEMOGLOBIN AND HEMATOCRIT, BLOOD   Collected By: Delilah Leung RN 11/6/2017  3:15 PM   TYPE AND SCREEN   Collected By: Lisa Neri 11/6/2017  3:09 PM   TISSUE PATHOLOGY EXAM Large Intestine, Cecum  Collected By: Ajith Mcduffie MD 11/6/2017  3:41 PM         Drains:   Urethral Catheter 11/06/17 1341 100% silicone 16 (Active)   Daily Indications Selected surgeries ( tract, abdomen) 11/6/2017  4:05 PM   Securement secured to upper leg with adhesive device 11/6/2017  4:05 PM   Irrigation Return clear;yellow 11/6/2017  4:05 PM   Tolerance no signs/symptoms of discomfort 11/6/2017  4:05 PM           Findings:42-year-old hypertensive female with long-standing Crohn's disease was found to have a stricture of her ileum.  She has tried multiple Biologics and currently is on Humira.  She has been unable to eat solid foods because of stricture.  There is a question as to whether not what looked like a fistula in the cecum was going to the sigmoid colon.  Although there was scarring around the sigmoid colon there was no active Crohn's there.  The appendix seemed to be wrapped into the fistula and the ileocecal area area and the only way I was able to find the appendix was by opening the resected  specimen and putting a hemostat in the ostia.  Even then I could not easily identified.  I carefully looked in the right gutter and there was no residual appendix there.  The mesentery to the small bowel and surprisingly the right colon was several inches thick and bled easily as did the omentum.  There was a significant bleeder deep in the mesentery and I could not get at it until I did the resection because the inflamed area was so large it was in the way.  Once that was resected hemoclips stop the bleeding and a mesenteric vein.  The gallbladder was surgically absent.  The hiatus was 2 fingers and there is no further inflammation in the colon or small bowel.    Technique: I started with a small subumbilical incision had extended above the umbilicus and distally to get control the bleeder and to get out the ovaries.  The ileum and cecum were like a single unit and it was difficult to move it around and she is able to mobilize the whole right colon and the hepatic flexure.  I went about 10 cm proximal to the inflamed ileum and started going to the mesentery and ligating with 0 Vicryls.  Each step was bloody.  I was eventually able to find a decent part of colon near the hepatic flexure to the anastomosis in a side to side anastomosis with a 75 green stapler was used and then reapplication of the stapler perpendicular to the original firing was used to complete the resection and close the enterotomy.  An isolated out the mesenteric bleeder and controlled.  Irrigation was carried out and all was dry so I proceeded down into the pelvis was about a 2-1/2 3 cm and at least one smaller one through the patient's right.  Because it was likely to be bloody and they looked benign and left them alone.  Both ovaries were isolated out and fallopian tube was tied off at the cornea and the ovarian arteries were ligated appropriately.  Left ovary had been pulled in very close to the sigmoid colon probably by prior Crohn's  inflammation.    The abdomen was irrigated with saline and I carefully watched for easily 10 minutes and there was no further bleeding.  GI contents were placed back anatomically.  The fascia was closed in a single layer with #1 PDS after the sponge instrument and needle counts were reportedly correct.  Skin was closed staples and a Telfa dressing applied.  No blood was given since her hemoglobin was still 10 and 33.  I will hold her blood pressure medicine till we see what she does in recovery and I will get the hospitalist.  Complications: Mesenteric bleeding resulting in 1 L blood loss.      Ajith Mcduffie MD     Date: 11/6/2017  Time: 4:24 PM

## 2017-11-06 NOTE — ANESTHESIA PROCEDURE NOTES
Airway  Urgency: elective    Date/Time: 11/6/2017 1:42 PM  Airway not difficult    General Information and Staff    Patient location during procedure: OR  Anesthesiologist: TIMOTHY CM  CRNA: LINSEY MCFADDEN    Indications and Patient Condition  Indications for airway management: airway protection    Preoxygenated: yes  MILS maintained throughout  Mask difficulty assessment: 1 - vent by mask    Final Airway Details  Final airway type: endotracheal airway      Successful airway: ETT  Cuffed: yes   Successful intubation technique: direct laryngoscopy  Endotracheal tube insertion site: oral  Blade: Tran  Blade size: #3  ETT size: 7.0 mm  Cormack-Lehane Classification: grade IIa - partial view of glottis  Placement verified by: chest auscultation and capnometry   Cuff volume (mL): 5  Measured from: lips  ETT to lips (cm): 19  Number of attempts at approach: 1

## 2017-11-06 NOTE — ANESTHESIA PREPROCEDURE EVALUATION
Anesthesia Evaluation     Patient summary reviewed and Nursing notes reviewed   no history of anesthetic complications:  NPO Solid Status: > 8 hours  NPO Liquid Status: > 8 hours     Airway   Mallampati: I  TM distance: >3 FB  Neck ROM: full  no difficulty expected  Dental - normal exam     Pulmonary - normal exam    breath sounds clear to auscultation  (+) a smoker Former,   Cardiovascular - normal exam  Exercise tolerance: good (4-7 METS)    ECG reviewed  Rhythm: regular  Rate: normal    (+) hypertension,   (-) angina, MALDONADO      Neuro/Psych  (+) psychiatric history Anxiety and Depression,    (-) seizures, neuromuscular disease, TIA  GI/Hepatic/Renal/Endo    (+)  GERD well controlled,   (-) hepatitis, liver disease, no renal disease, diabetes    Musculoskeletal (-) negative ROS    Abdominal   (+) obese,    Substance History - negative use     OB/GYN          Other - negative ROS                                       Anesthesia Plan    ASA 3     general   (Labs/studies reviewed  TAP block  Propofol GTT with VA)  intravenous induction   Anesthetic plan and risks discussed with patient.  Use of blood products discussed with consented to blood products.   Plan discussed with CRNA.

## 2017-11-06 NOTE — ANESTHESIA POSTPROCEDURE EVALUATION
Patient: Gloria Smith    Procedure Summary     Date Anesthesia Start Anesthesia Stop Room / Location    11/06/17 1338 1602 [facility] / [facility]       Procedure Diagnosis Surgeon Provider    ILEOCECOTOMY, BILATERAL SALPINGO OOPHERECTOMY, POSSIBLE LEFT COLECTOMY PARTIAL  (Bilateral Abdomen) No diagnosis on file. MD Ajith Lara MD          Anesthesia Type: general  Last vitals  BP   (!) 142/107 (11/06/17 1159)   Temp   96.9 °F (36.1 °C) (11/06/17 1159)   Pulse   89 (11/06/17 1159)   Resp   18 (11/06/17 1159)     SpO2   96 % (11/06/17 1159)     Post Anesthesia Care and Evaluation    Patient location during evaluation: PACU  Patient participation: complete - patient participated  Level of consciousness: awake and alert  Pain score: 0  Pain management: adequate  Airway patency: patent  Anesthetic complications: No anesthetic complications  PONV Status: none  Cardiovascular status: hemodynamically stable and acceptable  Respiratory status: nonlabored ventilation, acceptable and nasal cannula  Hydration status: acceptable

## 2017-11-06 NOTE — INTERVAL H&P NOTE
"[facility] Pre-op    Full history and physical note from office is up to date.  See office note attached.    BP (!) 142/107 (BP Location: Right arm, Patient Position: Lying)  Pulse 89  Temp 96.9 °F (36.1 °C) (Temporal Artery )   Resp 18  Ht 64\" (162.6 cm)  Wt 196 lb (88.9 kg)  SpO2 96%  BMI 33.64 kg/m2    IMM:  Influenza:  no  Pneumococcal:  no  Tetanus:  unknown  Impression: Nearly obstructing ileocecal Crohn's disease with possible sigmoid involvement.  Plan, ileocecal colectomy with possible sigmoid colectomy BSO.    Cancer Staging (if applicable)  Cancer Patient: __ yes _x_no __unknown__N/A; If yes, clinical stage T:__ N:__M:__, stage group or __N/A    Jackie Fernandez, RASHAD 11/6/2017 12:02 PM    "

## 2017-11-06 NOTE — ANESTHESIA PROCEDURE NOTES
Peripheral Block    Patient location during procedure: OR  Start time: 11/6/2017 1:43 PM  Reason for block: at surgeon's request and post-op pain management  Performed by  Anesthesiologist: TIMOTHY CM  Preanesthetic Checklist  Completed: patient identified, site marked, surgical consent, pre-op evaluation, timeout performed, IV checked, risks and benefits discussed and monitors and equipment checked  Prep:  Pt Position: supine  Sterile barriers:cap, gloves, sterile barriers and mask  Prep: ChloraPrep  Patient monitoring: blood pressure monitoring, continuous pulse oximetry and EKG  Procedure  Sedation:yes  Performed under: general  Guidance:ultrasound guided  Images:still images obtained    Laterality:Bilateral  Block Type:TAP  Injection Technique:single-shot  Needle Type:short-bevel and echogenic  Needle Gauge:20 G    Medications  Comment:Block Injection:  LA dose divided between Right and Left block       Adjuncts:  Decadron 4mg PSF, Buprenex 0.3mg (Per total volume of LA)  Local Injected:bupivacaine 0.25% Local Amount Injected:60mL  Post Assessment  Injection Assessment: negative aspiration for heme, incremental injection and no paresthesia on injection  Patient Tolerance:comfortable throughout block  Complications:no  Additional Notes      Under Ultrasound guidance, a BBraun 4inch 360 degree needle was advanced with Normal Saline hydro dissection of tissue.  The Internal Oblique and Transversus Abdominus muscles where visualized.  At or before the aponeurosis of Internal Oblique, local anesthetic spread was visualized in the Transversus Abdominus Plane. Injection was made incrementally with aspiration every 5 mls.  There was no  intravascular injection,  injection pressure was normal, there was no neural injection, and the procedure was completed without difficulty.  Thank You.

## 2017-11-07 LAB
ANION GAP SERPL CALCULATED.3IONS-SCNC: 3 MMOL/L (ref 3–11)
BASOPHILS # BLD AUTO: 0.01 10*3/MM3 (ref 0–0.2)
BASOPHILS NFR BLD AUTO: 0.1 % (ref 0–1)
BUN BLD-MCNC: 7 MG/DL (ref 9–23)
BUN/CREAT SERPL: 11.7 (ref 7–25)
CALCIUM SPEC-SCNC: 8.2 MG/DL (ref 8.7–10.4)
CHLORIDE SERPL-SCNC: 105 MMOL/L (ref 99–109)
CO2 SERPL-SCNC: 28 MMOL/L (ref 20–31)
CREAT BLD-MCNC: 0.6 MG/DL (ref 0.6–1.3)
DEPRECATED RDW RBC AUTO: 50.7 FL (ref 37–54)
EOSINOPHIL # BLD AUTO: 0 10*3/MM3 (ref 0–0.3)
EOSINOPHIL NFR BLD AUTO: 0 % (ref 0–3)
ERYTHROCYTE [DISTWIDTH] IN BLOOD BY AUTOMATED COUNT: 13.5 % (ref 11.3–14.5)
GFR SERPL CREATININE-BSD FRML MDRD: 109 ML/MIN/1.73
GLUCOSE BLD-MCNC: 141 MG/DL (ref 70–100)
HCT VFR BLD AUTO: 24.5 % (ref 34.5–44)
HGB BLD-MCNC: 8.2 G/DL (ref 11.5–15.5)
IMM GRANULOCYTES # BLD: 0.02 10*3/MM3 (ref 0–0.03)
IMM GRANULOCYTES NFR BLD: 0.2 % (ref 0–0.6)
LYMPHOCYTES # BLD AUTO: 0.96 10*3/MM3 (ref 0.6–4.8)
LYMPHOCYTES NFR BLD AUTO: 12 % (ref 24–44)
MCH RBC QN AUTO: 34.3 PG (ref 27–31)
MCHC RBC AUTO-ENTMCNC: 33.5 G/DL (ref 32–36)
MCV RBC AUTO: 102.5 FL (ref 80–99)
MONOCYTES # BLD AUTO: 0.86 10*3/MM3 (ref 0–1)
MONOCYTES NFR BLD AUTO: 10.7 % (ref 0–12)
NEUTROPHILS # BLD AUTO: 6.18 10*3/MM3 (ref 1.5–8.3)
NEUTROPHILS NFR BLD AUTO: 77 % (ref 41–71)
PLATELET # BLD AUTO: 174 10*3/MM3 (ref 150–450)
PMV BLD AUTO: 9.4 FL (ref 6–12)
POTASSIUM BLD-SCNC: 4.3 MMOL/L (ref 3.5–5.5)
RBC # BLD AUTO: 2.39 10*6/MM3 (ref 3.89–5.14)
SODIUM BLD-SCNC: 136 MMOL/L (ref 132–146)
WBC NRBC COR # BLD: 8.03 10*3/MM3 (ref 3.5–10.8)

## 2017-11-07 PROCEDURE — 25010000002 HYDROMORPHONE PER 4 MG: Performed by: INTERNAL MEDICINE

## 2017-11-07 PROCEDURE — 25010000002 NA FERRIC GLUC CPLX PER 12.5 MG: Performed by: INTERNAL MEDICINE

## 2017-11-07 PROCEDURE — 99223 1ST HOSP IP/OBS HIGH 75: CPT | Performed by: INTERNAL MEDICINE

## 2017-11-07 PROCEDURE — 25010000002 HYDROMORPHONE PER 4 MG: Performed by: COLON & RECTAL SURGERY

## 2017-11-07 PROCEDURE — 80048 BASIC METABOLIC PNL TOTAL CA: CPT | Performed by: COLON & RECTAL SURGERY

## 2017-11-07 PROCEDURE — 85025 COMPLETE CBC W/AUTO DIFF WBC: CPT | Performed by: COLON & RECTAL SURGERY

## 2017-11-07 PROCEDURE — 25010000002 HEPARIN (PORCINE) PER 1000 UNITS: Performed by: COLON & RECTAL SURGERY

## 2017-11-07 RX ORDER — NALOXONE HCL 0.4 MG/ML
0.4 VIAL (ML) INJECTION
Status: DISCONTINUED | OUTPATIENT
Start: 2017-11-07 | End: 2017-11-13 | Stop reason: HOSPADM

## 2017-11-07 RX ORDER — HYDROMORPHONE HYDROCHLORIDE 1 MG/ML
1 INJECTION, SOLUTION INTRAMUSCULAR; INTRAVENOUS; SUBCUTANEOUS
Status: DISCONTINUED | OUTPATIENT
Start: 2017-11-07 | End: 2017-11-10

## 2017-11-07 RX ADMIN — ACETAMINOPHEN 1000 MG: 500 TABLET ORAL at 18:03

## 2017-11-07 RX ADMIN — SODIUM CHLORIDE 125 MG: 9 INJECTION, SOLUTION INTRAVENOUS at 13:32

## 2017-11-07 RX ADMIN — ACETAMINOPHEN 1000 MG: 500 TABLET ORAL at 08:31

## 2017-11-07 RX ADMIN — ALVIMOPAN 12 MG: 12 CAPSULE ORAL at 18:02

## 2017-11-07 RX ADMIN — HEPARIN SODIUM 5000 UNITS: 5000 INJECTION, SOLUTION INTRAVENOUS; SUBCUTANEOUS at 13:32

## 2017-11-07 RX ADMIN — FAMOTIDINE 20 MG: 20 TABLET, FILM COATED ORAL at 18:04

## 2017-11-07 RX ADMIN — ESCITALOPRAM OXALATE 10 MG: 10 TABLET ORAL at 08:31

## 2017-11-07 RX ADMIN — OXYCODONE HYDROCHLORIDE AND ACETAMINOPHEN 1 TABLET: 7.5; 325 TABLET ORAL at 08:31

## 2017-11-07 RX ADMIN — OXYCODONE HYDROCHLORIDE AND ACETAMINOPHEN 1 TABLET: 7.5; 325 TABLET ORAL at 13:33

## 2017-11-07 RX ADMIN — HEPARIN SODIUM 5000 UNITS: 5000 INJECTION, SOLUTION INTRAVENOUS; SUBCUTANEOUS at 05:16

## 2017-11-07 RX ADMIN — FLUTICASONE PROPIONATE 2 SPRAY: 50 SPRAY, METERED NASAL at 08:31

## 2017-11-07 RX ADMIN — GABAPENTIN 600 MG: 300 CAPSULE ORAL at 08:31

## 2017-11-07 RX ADMIN — HYDROMORPHONE HYDROCHLORIDE 0.5 MG: 1 INJECTION, SOLUTION INTRAMUSCULAR; INTRAVENOUS; SUBCUTANEOUS at 08:31

## 2017-11-07 RX ADMIN — OXYCODONE HYDROCHLORIDE AND ACETAMINOPHEN 1 TABLET: 7.5; 325 TABLET ORAL at 18:10

## 2017-11-07 RX ADMIN — ACETAMINOPHEN 1000 MG: 500 TABLET ORAL at 20:25

## 2017-11-07 RX ADMIN — HYDROMORPHONE HYDROCHLORIDE 1 MG: 1 INJECTION, SOLUTION INTRAMUSCULAR; INTRAVENOUS; SUBCUTANEOUS at 16:43

## 2017-11-07 RX ADMIN — DIAZEPAM 5 MG: 5 TABLET ORAL at 13:32

## 2017-11-07 RX ADMIN — DIAZEPAM 5 MG: 5 TABLET ORAL at 05:16

## 2017-11-07 RX ADMIN — HEPARIN SODIUM 5000 UNITS: 5000 INJECTION, SOLUTION INTRAVENOUS; SUBCUTANEOUS at 20:25

## 2017-11-07 RX ADMIN — FAMOTIDINE 20 MG: 20 TABLET, FILM COATED ORAL at 08:31

## 2017-11-07 RX ADMIN — HYDROMORPHONE HYDROCHLORIDE 1 MG: 1 INJECTION, SOLUTION INTRAMUSCULAR; INTRAVENOUS; SUBCUTANEOUS at 20:26

## 2017-11-07 RX ADMIN — GABAPENTIN 600 MG: 300 CAPSULE ORAL at 18:01

## 2017-11-07 RX ADMIN — ALVIMOPAN 12 MG: 12 CAPSULE ORAL at 08:31

## 2017-11-07 RX ADMIN — CETIRIZINE HYDROCHLORIDE 10 MG: 10 TABLET, FILM COATED ORAL at 08:31

## 2017-11-07 RX ADMIN — HYDROMORPHONE HYDROCHLORIDE 0.5 MG: 1 INJECTION, SOLUTION INTRAMUSCULAR; INTRAVENOUS; SUBCUTANEOUS at 02:43

## 2017-11-07 NOTE — PROGRESS NOTES
Discharge Planning Assessment  [facility]     Patient Name: Gloria Smith  MRN: [id number]  Today's Date: 11/7/2017    Admit Date: 11/6/2017          Discharge Needs Assessment       11/07/17 1145    Living Environment    Lives With spouse    Living Arrangements house    Home Accessibility no concerns    Living Environment Comment Supportive family    Discharge Needs Assessment    Community Agency Name(S) No HH involved    Equipment Currently Used at Home none    Equipment Needed After Discharge none            Discharge Plan       11/07/17 1146    Case Management/Social Work Plan    Plan Home at DC    Patient/Family In Agreement With Plan yes    Additional Comments I spoke with the pt. She has no DC needs.        Discharge Placement     No information found        Expected Discharge Date and Time     Expected Discharge Date Expected Discharge Time    Nov 8, 2017               Demographic Summary     None            Functional Status       11/07/17 1145    Functional Status Prior    Ambulation 0-->independent    Transferring 0-->independent    Toileting 0-->independent    Bathing 0-->independent    Dressing 0-->independent    Eating 0-->independent    IADL    Medications independent    Meal Preparation independent    Housekeeping independent    Laundry independent    Shopping independent    Oral Care independent            Psychosocial     None            Abuse/Neglect     None            Legal     None            Substance Abuse     None            Patient Forms     None          Naya Nichols RN

## 2017-11-07 NOTE — PROGRESS NOTES
[facility] Medicine Services  CONSULT NOTE      Patient Name: Gloria Smith  : 1975  MRN: [id number]    Primary Care Physician: Shaun Taylor MD  Referring Provider: Ajith Mcduffie MD    Subjective   Subjective     Reason for Consultation:  Medical management postop.    HPI:  Gloria Smith is a 42 y.o. female with longstanding Crohns who has failed multiple treatments including biologics and proceeded on  with ileocecal resection.  Surgery was complicated by the inflammation, a deep bleeding in the mesentery, and question of cecal-sigmoid fistula.     POD 1 she has pain but is eating and drinking and walking to the [place].  Has not passed gas or had a BM.  No nausea however.     Review of Systems    Gen- No fevers, chills  CV- No chest pain, palpitations  Resp- No cough, dyspnea  GI- No N/V/D,   Otherwise 10-system ROS reviewed and is negative except as mentioned in the HPI.      Past Medical History:   Diagnosis Date   • Acid reflux    • Anxiety    • Bowel trouble    • BRCA gene positive    • Constipation    • Crohn's disease    • Diarrhea    • Hypertension    • Wears glasses S       Past Surgical History:   Procedure Laterality Date   • ANKLE SURGERY     • BILATERAL BREAST REDUCTION     • CHOLECYSTECTOMY     • COLONOSCOPY         • EXPLORATORY LAPAROTOMY         Family History: family history includes No Known Problems in her father and mother.     Social History:  reports that she has quit smoking. Her smoking use included Cigarettes. She has a 3.75 pack-year smoking history. She has never used smokeless tobacco. She reports that she does not drink alcohol or use illicit drugs.    Medications:  Prescriptions Prior to Admission   Medication Sig Dispense Refill Last Dose   • cetirizine (zyrTEC) 10 MG tablet Take 10 mg by mouth Daily.   2017 at 0715   • escitalopram (LEXAPRO) 10 MG tablet Take 10 mg by mouth Daily.   2017 at 0715   • metoprolol succinate  XL (TOPROL-XL) 25 MG 24 hr tablet Take 25 mg by mouth Daily.   11/6/2017 at 0715   • metroNIDAZOLE (FLAGYL) 250 MG tablet Take 500 mg by mouth Every 12 (Twelve) Hours.   11/6/2017 at 0715   • neomycin (MYCIFRADIN) 500 MG tablet Take 1,000 mg by mouth Every 12 (Twelve) Hours.   11/6/2017 at 0715   • raNITIdine (ZANTAC) 150 MG tablet Take 150 mg by mouth 2 (Two) Times a Day.   11/6/2017 at 0715   • fluticasone (FLONASE) 50 MCG/ACT nasal spray 2 sprays into each nostril daily  Administer 2 sprays in each nostril for each dose.   11/1/2017   • HYDROcodone-acetaminophen (NORCO) 7.5-325 MG per tablet Take 1 tablet by mouth Every 6 (Six) Hours As Needed for Moderate Pain (4-6). 30 tablet 0 10/29/2017   • ibuprofen (ADVIL,MOTRIN) 400 MG tablet Take 400 mg by mouth Every 6 (Six) Hours As Needed for Mild Pain .   11/3/2017   • InFLIXimab (REMICADE IV) Infuse 1 Bag into a venous catheter Take As Directed. TWICE MONTHLY   10/23/2017       No Known Allergies    Objective   Objective     Vital Signs:   Temp:  [96.9 °F (36.1 °C)-99.5 °F (37.5 °C)] 98.8 °F (37.1 °C)  Heart Rate:  [] 103  Resp:  [16-18] 17  BP: ()/() 128/91     Physical Exam  Constitutional: No acute distress, awake, alert in bed, mother present  Eyes: PERRLA, sclerae anicteric, no conjunctival injection  HENT: NCAT, mucous membranes moist  Neck: Supple, no thyromegaly, no lymphadenopathy, trachea midline  Respiratory: Clear to auscultation bilaterally, nonlabored respirations   Cardiovascular: RRR, no murmurs, rubs, or gallops, palpable pedal pulses bilaterally  Gastrointestinal: appropariately tender.  Midline incision is dressed, no odor. Bowel sounds heard.   Musculoskeletal: No bilateral ankle edema, no clubbing or cyanosis to extremities  Psychiatric: Appropriate affect, cooperative  Neurologic: Oriented x 3, strength symmetric in all extremities, Cranial Nerves grossly intact to confrontation, speech clear  Skin: No rashes    Results  Reviewed:  I have personally reviewed current lab, radiology, and data and agree.      Results from last 7 days  Lab Units 11/07/17  0734   WBC 10*3/mm3 8.03   HEMOGLOBIN g/dL 8.2*   HEMATOCRIT % 24.5*   PLATELETS 10*3/mm3 174       Results from last 7 days  Lab Units 11/07/17  0734   SODIUM mmol/L 136   POTASSIUM mmol/L 4.3   CHLORIDE mmol/L 105   CO2 mmol/L 28.0   BUN mg/dL 7*   CREATININE mg/dL 0.60   GLUCOSE mg/dL 141*   CALCIUM mg/dL 8.2*     No results found for: BNP  No results found for: PHART    Microbiology Results Abnormal     None          Imaging Results (last 24 hours)     ** No results found for the last 24 hours. **             Assessment/Plan   Assessment / Plan     Hospital Problem List     Crohn disease        S/p ileocecal resectrion 11/6 for intractable Crohns.  Doing well.      Plan:    Postop anemia  -- follow  -- will need iron nreplacement - start ferrlecit  -- check b12 and folate given macrocytosis    Pain control  -- stable.  Increase dilaudid for one day.     pulm prophylaxis  -- IS, mobilize    Hypothyroid  -- continue home meds    vte proph - hep sq      Thank you for allowing [facility] Medicine Service to provide consultative care for your patient, we will continue to follow while clinically appropriate.    Lindsay Dickinson MD   11/07/17   11:22 AM

## 2017-11-07 NOTE — PLAN OF CARE
Problem: Patient Care Overview (Adult)  Goal: Plan of Care Review  Outcome: Ongoing (interventions implemented as appropriate)    11/07/17 6684   Coping/Psychosocial Response Interventions   Plan Of Care Reviewed With patient   Patient Care Overview   Progress improving   Outcome Evaluation   Outcome Summary/Follow up Plan Pt is doing well. Pt is still c/o pain frequently 9-10/10. Pt has no complaitns of nausea. Pt eating well. Pt walked 4x's. Gross catheter pulled at 1800. VSS.

## 2017-11-07 NOTE — PROGRESS NOTES
"Colon and Rectal [CSGA]    POD # 1    /76 (BP Location: Right arm, Patient Position: Lying)  Pulse 87  Temp 98.3 °F (36.8 °C) (Oral)   Resp 16  Ht 64\" (162.6 cm)  Wt 196 lb (88.9 kg)  LMP 09/06/2017  SpO2 97%  BMI 33.64 kg/m2    Lab Results (last 24 hours)     Procedure Component Value Units Date/Time    Hemoglobin & Hematocrit, Blood [[id number]]  (Abnormal) Collected:  11/06/17 2143    Specimen:  Blood Updated:  11/06/17 2150     Hemoglobin 9.9 (L) g/dL      Hematocrit 29.3 (L) %     CBC & Differential [[id number]] Collected:  11/07/17 0734    Specimen:  Blood Updated:  11/07/17 0754    Narrative:       The following orders were created for panel order CBC & Differential.  Procedure                               Abnormality         Status                     ---------                               -----------         ------                     CBC Auto Differential[[id number]]        Abnormal            Final result                 Please view results for these tests on the individual orders.    CBC Auto Differential [[id number]]  (Abnormal) Collected:  11/07/17 0734    Specimen:  Blood Updated:  11/07/17 0754     WBC 8.03 10*3/mm3      RBC 2.39 (L) 10*6/mm3      Hemoglobin 8.2 (L) g/dL      Hematocrit 24.5 (L) %      .5 (H) fL      MCH 34.3 (H) pg      MCHC 33.5 g/dL      RDW 13.5 %      RDW-SD 50.7 fl      MPV 9.4 fL      Platelets 174 10*3/mm3      Neutrophil % 77.0 (H) %      Lymphocyte % 12.0 (L) %      Monocyte % 10.7 %      Eosinophil % 0.0 %      Basophil % 0.1 %      Immature Grans % 0.2 %      Neutrophils, Absolute 6.18 10*3/mm3      Lymphocytes, Absolute 0.96 10*3/mm3      Monocytes, Absolute 0.86 10*3/mm3      Eosinophils, Absolute 0.00 10*3/mm3      Basophils, Absolute 0.01 10*3/mm3      Immature Grans, Absolute 0.02 10*3/mm3     Tissue Pathology Exam - Tissue, Large Intestine, Cecum [[id number]] Collected:  11/06/17 1504    Specimen:  Tissue from Large Intestine, Cecum; Tissue from " Fallopian Tubes, Bilateral Updated:  11/07/17 0813    Basic Metabolic Panel [[id number]]  (Abnormal) Collected:  11/07/17 0734    Specimen:  Blood Updated:  11/07/17 0815     Glucose 141 (H) mg/dL      BUN 7 (L) mg/dL      Creatinine 0.60 mg/dL      Sodium 136 mmol/L      Potassium 4.3 mmol/L      Chloride 105 mmol/L      CO2 28.0 mmol/L      Calcium 8.2 (L) mg/dL      eGFR Non African Amer 109 mL/min/1.73      BUN/Creatinine Ratio 11.7     Anion Gap 3.0 mmol/L     Narrative:       [employer] Guidelines    Stage     Description        GFR  1         Normal or High     90+  2         Mild decrease      60-89  3         Moderate decrease  30-59  4         Severe decrease    15-29  5         Kidney failure     <15          I/O this shift:  In: -   Out: 2450 [Urine:2450]    Alert and oriented.  No nausea or vomiting.  Good pain control  Good UO. Labs stable  No flatus or stool yet.  Stable post op course.  Pathology pending.    Order Name Source Comment Collection Info Order Time   OR POTASSIUM   Collected By: Paulette Doan RN 11/6/2017 11:39 AM   HEMOGLOBIN AND HEMATOCRIT, BLOOD   Collected By: Delilah Leung RN 11/6/2017  3:15 PM   TYPE AND SCREEN   Collected By: Lisa Neri 11/6/2017  3:09 PM   TISSUE PATHOLOGY EXAM Large Intestine, Cecum  Collected By: Ajith Mcduffie MD 11/6/2017  3:41 PM   .    Ajith Mcduffie MD  11/07/17  5:36 PM

## 2017-11-08 LAB
ANION GAP SERPL CALCULATED.3IONS-SCNC: 2 MMOL/L (ref 3–11)
BUN BLD-MCNC: 6 MG/DL (ref 9–23)
BUN/CREAT SERPL: 12 (ref 7–25)
CALCIUM SPEC-SCNC: 8.7 MG/DL (ref 8.7–10.4)
CHLORIDE SERPL-SCNC: 107 MMOL/L (ref 99–109)
CO2 SERPL-SCNC: 29 MMOL/L (ref 20–31)
CREAT BLD-MCNC: 0.5 MG/DL (ref 0.6–1.3)
CYTO UR: NORMAL
DEPRECATED RDW RBC AUTO: 52.9 FL (ref 37–54)
ERYTHROCYTE [DISTWIDTH] IN BLOOD BY AUTOMATED COUNT: 13.9 % (ref 11.3–14.5)
FOLATE SERPL-MCNC: 10.44 NG/ML (ref 3.2–20)
GFR SERPL CREATININE-BSD FRML MDRD: 134 ML/MIN/1.73
GLUCOSE BLD-MCNC: 92 MG/DL (ref 70–100)
HCT VFR BLD AUTO: 22.5 % (ref 34.5–44)
HGB BLD-MCNC: 7.4 G/DL (ref 11.5–15.5)
LAB AP CASE REPORT: NORMAL
LAB AP CLINICAL INFORMATION: NORMAL
LAB AP DIAGNOSIS COMMENT: NORMAL
Lab: NORMAL
MCH RBC QN AUTO: 34.6 PG (ref 27–31)
MCHC RBC AUTO-ENTMCNC: 32.9 G/DL (ref 32–36)
MCV RBC AUTO: 105.1 FL (ref 80–99)
PATH REPORT.FINAL DX SPEC: NORMAL
PATH REPORT.GROSS SPEC: NORMAL
PLATELET # BLD AUTO: 183 10*3/MM3 (ref 150–450)
PMV BLD AUTO: 9.9 FL (ref 6–12)
POTASSIUM BLD-SCNC: 4.5 MMOL/L (ref 3.5–5.5)
RBC # BLD AUTO: 2.14 10*6/MM3 (ref 3.89–5.14)
SODIUM BLD-SCNC: 138 MMOL/L (ref 132–146)
VIT B12 BLD-MCNC: 544 PG/ML (ref 211–911)
WBC NRBC COR # BLD: 7.35 10*3/MM3 (ref 3.5–10.8)

## 2017-11-08 PROCEDURE — 99232 SBSQ HOSP IP/OBS MODERATE 35: CPT | Performed by: INTERNAL MEDICINE

## 2017-11-08 PROCEDURE — 25010000002 HYDROMORPHONE PER 4 MG: Performed by: INTERNAL MEDICINE

## 2017-11-08 PROCEDURE — 25010000002 HEPARIN (PORCINE) PER 1000 UNITS: Performed by: COLON & RECTAL SURGERY

## 2017-11-08 PROCEDURE — 82746 ASSAY OF FOLIC ACID SERUM: CPT | Performed by: INTERNAL MEDICINE

## 2017-11-08 PROCEDURE — 82607 VITAMIN B-12: CPT | Performed by: INTERNAL MEDICINE

## 2017-11-08 PROCEDURE — 25010000002 NA FERRIC GLUC CPLX PER 12.5 MG: Performed by: INTERNAL MEDICINE

## 2017-11-08 PROCEDURE — 80048 BASIC METABOLIC PNL TOTAL CA: CPT | Performed by: INTERNAL MEDICINE

## 2017-11-08 PROCEDURE — 85027 COMPLETE CBC AUTOMATED: CPT | Performed by: INTERNAL MEDICINE

## 2017-11-08 RX ORDER — HYDROCODONE BITARTRATE AND ACETAMINOPHEN 7.5; 325 MG/1; MG/1
1 TABLET ORAL EVERY 6 HOURS PRN
Status: DISCONTINUED | OUTPATIENT
Start: 2017-11-08 | End: 2017-11-13 | Stop reason: HOSPADM

## 2017-11-08 RX ADMIN — HEPARIN SODIUM 5000 UNITS: 5000 INJECTION, SOLUTION INTRAVENOUS; SUBCUTANEOUS at 05:27

## 2017-11-08 RX ADMIN — ESCITALOPRAM OXALATE 10 MG: 10 TABLET ORAL at 09:49

## 2017-11-08 RX ADMIN — HEPARIN SODIUM 5000 UNITS: 5000 INJECTION, SOLUTION INTRAVENOUS; SUBCUTANEOUS at 13:49

## 2017-11-08 RX ADMIN — ALVIMOPAN 12 MG: 12 CAPSULE ORAL at 16:38

## 2017-11-08 RX ADMIN — ACETAMINOPHEN 1000 MG: 500 TABLET ORAL at 16:37

## 2017-11-08 RX ADMIN — HEPARIN SODIUM 5000 UNITS: 5000 INJECTION, SOLUTION INTRAVENOUS; SUBCUTANEOUS at 20:16

## 2017-11-08 RX ADMIN — FAMOTIDINE 20 MG: 20 TABLET, FILM COATED ORAL at 09:49

## 2017-11-08 RX ADMIN — HYDROMORPHONE HYDROCHLORIDE 1 MG: 1 INJECTION, SOLUTION INTRAMUSCULAR; INTRAVENOUS; SUBCUTANEOUS at 13:49

## 2017-11-08 RX ADMIN — FLUTICASONE PROPIONATE 2 SPRAY: 50 SPRAY, METERED NASAL at 09:49

## 2017-11-08 RX ADMIN — HYDROMORPHONE HYDROCHLORIDE 1 MG: 1 INJECTION, SOLUTION INTRAMUSCULAR; INTRAVENOUS; SUBCUTANEOUS at 05:28

## 2017-11-08 RX ADMIN — CETIRIZINE HYDROCHLORIDE 10 MG: 10 TABLET, FILM COATED ORAL at 09:49

## 2017-11-08 RX ADMIN — HYDROMORPHONE HYDROCHLORIDE 1 MG: 1 INJECTION, SOLUTION INTRAMUSCULAR; INTRAVENOUS; SUBCUTANEOUS at 09:49

## 2017-11-08 RX ADMIN — ACETAMINOPHEN 1000 MG: 500 TABLET ORAL at 09:49

## 2017-11-08 RX ADMIN — HYDROCODONE BITARTRATE AND ACETAMINOPHEN 1 TABLET: 7.5; 325 TABLET ORAL at 23:15

## 2017-11-08 RX ADMIN — HYDROMORPHONE HYDROCHLORIDE 1 MG: 1 INJECTION, SOLUTION INTRAMUSCULAR; INTRAVENOUS; SUBCUTANEOUS at 20:17

## 2017-11-08 RX ADMIN — GABAPENTIN 600 MG: 300 CAPSULE ORAL at 16:38

## 2017-11-08 RX ADMIN — SODIUM CHLORIDE 125 MG: 9 INJECTION, SOLUTION INTRAVENOUS at 09:48

## 2017-11-08 RX ADMIN — FAMOTIDINE 20 MG: 20 TABLET, FILM COATED ORAL at 16:38

## 2017-11-08 RX ADMIN — OXYCODONE HYDROCHLORIDE AND ACETAMINOPHEN 1 TABLET: 7.5; 325 TABLET ORAL at 06:55

## 2017-11-08 RX ADMIN — HYDROCODONE BITARTRATE AND ACETAMINOPHEN 1 TABLET: 7.5; 325 TABLET ORAL at 17:02

## 2017-11-08 RX ADMIN — ALVIMOPAN 12 MG: 12 CAPSULE ORAL at 09:49

## 2017-11-08 RX ADMIN — GABAPENTIN 600 MG: 300 CAPSULE ORAL at 09:49

## 2017-11-08 NOTE — PLAN OF CARE
Problem: Patient Care Overview (Adult)  Goal: Plan of Care Review  Outcome: Ongoing (interventions implemented as appropriate)    11/08/17 0424   Coping/Psychosocial Response Interventions   Plan Of Care Reviewed With patient   Patient Care Overview   Progress progress toward functional goals as expected   Outcome Evaluation   Outcome Summary/Follow up Plan VSS. pt ambulated around unit before bed. medicated with one does of PRN pain medication. no other complaints. D/C home possible today.

## 2017-11-08 NOTE — PLAN OF CARE
Problem: Patient Care Overview (Adult)  Goal: Plan of Care Review  Outcome: Ongoing (interventions implemented as appropriate)    11/08/17 0424   Patient Care Overview   Progress progress toward functional goals as expected       Goal: Adult Individualization and Mutuality  Outcome: Ongoing (interventions implemented as appropriate)  Goal: Discharge Needs Assessment  Outcome: Ongoing (interventions implemented as appropriate)    11/06/17 1201 11/07/17 1145   Discharge Needs Assessment   Community Agency Name(S) --  No HH involved   Equipment Needed After Discharge --  none   Living Environment   Transportation Available family or friend will provide;car --    Self-Care   Equipment Currently Used at Home --  none

## 2017-11-08 NOTE — PROGRESS NOTES
"Colon and Rectal [CSGA]    POD # 2    /62 (BP Location: Right arm, Patient Position: Lying)  Pulse 102  Temp 98.2 °F (36.8 °C) (Oral)   Resp 18  Ht 64\" (162.6 cm)  Wt 196 lb (88.9 kg)  LMP 09/06/2017  SpO2 96%  BMI 33.64 kg/m2    Lab Results (last 24 hours)     Procedure Component Value Units Date/Time    Basic Metabolic Panel [[id number]]  (Abnormal) Collected:  11/08/17 0335    Specimen:  Blood Updated:  11/08/17 0452     Glucose 92 mg/dL      BUN 6 (L) mg/dL      Creatinine 0.50 (L) mg/dL      Sodium 138 mmol/L      Potassium 4.5 mmol/L      Chloride 107 mmol/L      CO2 29.0 mmol/L      Calcium 8.7 mg/dL      eGFR Non African Amer 134 mL/min/1.73      BUN/Creatinine Ratio 12.0     Anion Gap 2.0 (L) mmol/L     Narrative:       National Kid[employer] Guidelines    Stage     Description        GFR  1         Normal or High     90+  2         Mild decrease      60-89  3         Moderate decrease  30-59  4         Severe decrease    15-29  5         Kidney failure     <15    CBC (No Diff) [[id number]]  (Abnormal) Collected:  11/08/17 0335    Specimen:  Blood Updated:  11/08/17 0455     WBC 7.35 10*3/mm3      RBC 2.14 (L) 10*6/mm3      Hemoglobin 7.4 (L) g/dL      Hematocrit 22.5 (L) %      .1 (H) fL      MCH 34.6 (H) pg      MCHC 32.9 g/dL      RDW 13.9 %      RDW-SD 52.9 fl      MPV 9.9 fL      Platelets 183 10*3/mm3     Vitamin B12 [[id number]]  (Normal) Collected:  11/08/17 0335    Specimen:  Blood Updated:  11/08/17 0841     Vitamin B-12 544 pg/mL     Folate [[id number]]  (Normal) Collected:  11/08/17 0335    Specimen:  Blood Updated:  11/08/17 0841     Folate 10.44 ng/mL     Narrative:         Folate Reference Ranges:    Deficient:            Less than 1.2 ng/mL  Indeterminant:        1.2-3.1 ng/mL  Normal:               3.2-20.0 ng/mL    Tissue Pathology Exam - Tissue, Large Intestine, Cecum [[id number]] Collected:  11/06/17 1504    Specimen:  Tissue from Large Intestine, Cecum; Tissue from " Fallopian Tubes, Bilateral Updated:  11/08/17 1526     Case Report --     Surgical Pathology Report                         Case: [id number]                                  Authorizing Provider:  Ajith Mcduffie MD        Collected:           11/06/2017 03:04 PM          Ordering Location:     [facility]   Received:            11/07/2017 08:13 AM                                 OR                                                                           Pathologist:           Migue Griffiths MD                                                         Specimens:   1) - Large Intestine, Cecum, ILEOCECUM                                                              2) - Fallopian Tubes, Bilateral, BILATERAL TUBES AND OVARIES                                Clinical Information --     The working history is Crohn's disease.       Final Diagnosis --     1. ILEOCECUM, ILEOCECECTOMY:  Chronic active colitis and ileitis compatible with history of Crohn's disease, with inflammatory pseudopolyp formation.   Appendix with fibrous obliteration of the lumen and reactive changes.   Negative for dysplasia.  2. BILATERAL FALLOPIAN TUBES AND OVARIES, SALPINGO-OOPHORECTOMY:  Benign physiologic ovaries x2.  Fallopian tubes with no significant histopathologic change x2.  PCC/klb        Comment --     The changes in the ileum and colon are compatible with the patient's history of Crohns disease.  The appendix shows partial fibrous obliteration of the lumen and reactive changes.  An inflammatory pseudopolyp is present.  There is no evidence of dysplasia.  No fistula is identified.       Gross Description --     Specimen 1 received in formalin labeled as ileocecum is an ileocecal resection specimen stapled end to end which consists of a 14.0 cm long portion of terminal ileum with a circumference ranging from 2.0 to 4.5 cm, ileocecal valve, cecum and an 8.5 cm long by 8.0 cm average circumference portion of ascending colon.   The serosa is tan/brown with moderate adhesions.  Within the terminal ileum and leading up to the ileocecal valve is a 7.5 cm long portion of attenuated hemorrhagic mucosa which has lost its normal folding pattern.  This area appears strictured.  Within this area is a 0.5 x 0.4 x 0.4 cm pedunculated polyp that is 11.5 cm from the proximal margin, 13.5 cm from the distal margin, and 7.0 cm from the mesenteric margin.  There is also a 0.2 cm in diameter nodule on the colonic side of the ileocecal valve. No other polyps or masses are grossly identified.  The remaining ileal and colonic mucosa is tan with a normal folding pattern.  The bowel wall thickness ranges from 0.1 to 0.3 cm.  No diverticula are identified.  A 3.0 cm long by 0.5 cm in diameter possible appendix is identified densely adhesed to the pericolonic fat.  The serosa is tan/brown with mild adhesions.  Sectioning reveals a fatty, but otherwise grossly unremarkable lumen.  No fecaliths or perforations are identified.  A 0.6 cm in diameter lymph node candidate is identified in the mesenteric fat.  Representative sections are submitted as follows:  Block 1-A - proximal margin, en face; 1-B - distal margin, en face; 1-C - serosal adhesions; 1D ileocecal valve with nodule, 1E-1F hemorrhagic attenuated mucosa; 1-G - pedunculated polyp submitted entirely; 1-H - appendix and 1-I - lymph node candidate, bisected.      Specimen 2 received in formalin labeled as bilateral tubes and ovaries are two tuboovarian complexes undesignated as to laterality.  They are now arbitrarily designated complex A and complex B.  The ovaries average 2.8 x 2.1 x 1.0 cm and have a tan cerebriform outer surface.   Sectioning of ovary B reveals two unilocular cysts containing clear fluid ranging in diameter from 0.6 to 0.9 cm.  Papillary excrescences are not identified.  The remaining cut surfaces of both ovaries are grossly unremarkable.  Both fallopian tubes average 2.5 cm in length by  0.6 cm in diameter.  Both tubes are tortuous, fimbriated and display a pinpoint lumen.  Both tubes also display multiple paratubal cysts containing clear fluid ranging in diameter from 0.2 to 0.3 cm.  Representative sections are submitted as follows:  2-A - ovary A; 2-B - fallopian tube A and fimbria; 2-C - ovary B and 2-D - fallopian B and fimbria.  LED/dlb             Microscopic Description --     The slides are reviewed and demonstrate histopathologic features supporting the above rendered diagnosis.           Embedded Images --          I/O this shift:  In: -   Out: 1500 [Urine:1500]    Alert and oriented.  No nausea or vomiting.  Good pain control  Good UO. 11 7.4.  Blood pressure has been low but her urine output is excellent some not worried.  Hopefully her hemoglobin will stabilize tomorrow and she might be only get home.  No flatus or stool yet.  Stable post op course.  Pathology benign.    Order Name Source Comment Collection Info Order Time   OR POTASSIUM   Collected By: Paulette Doan RN 11/6/2017 11:39 AM   HEMOGLOBIN AND HEMATOCRIT, BLOOD   Collected By: Delilah Leung RN 11/6/2017  3:15 PM   TYPE AND SCREEN   Collected By: Lisa Neri 11/6/2017  3:09 PM   TISSUE PATHOLOGY EXAM Large Intestine, Cecum  Collected By: Ajith Mcduffie MD 11/6/2017  3:41 PM   .    Ajith Mcduffie MD  11/08/17  3:33 PM

## 2017-11-08 NOTE — PROGRESS NOTES
[facility] Medicine Services  PROGRESS NOTE    Patient Name: Gloria Smith  : 1975  MRN: [id number]    Date of Admission: 2017  Length of Stay: 2  Primary Care Physician: Shaun Taylor MD    Subjective   Subjective     CC:   uncontrolled crohns    HPI:  Pain is main problem.  However she is up walking in room, eating okay.   Passed gas x 1,, no BM.   No nausea    Review of Systems  Gen- No fevers, chills  CV- No chest pain, palpitations  Resp- No cough, dyspnea  GI- No N/V/D, abd pain  Otherwise ROS is negative except as mentioned in the HPI.    Objective   Objective     Vital Signs:   Temp:  [97.2 °F (36.2 °C)-98.3 °F (36.8 °C)] 98.2 °F (36.8 °C)  Heart Rate:  [] 102  Resp:  [16-18] 18  BP: (102-120)/(62-94) 102/62        Physical Exam:  Gen:  WD/WN in bed.  Father present  Neuro: alert and oriented, clear speech, follows commands, grossly nonfocal  HEENT:  NC/AT PERRL, OP benign  Neck:  Supple, no LAD  Heart RRR no murmur, rub, or gallop  Lungs CTA nonlabored  Abd:  Soft,  no rebound or guarding, pos BS.  Midline incision.  Tender diffusely  Extrem:  No c/c/e    Results Reviewed:  I have personally reviewed current lab, radiology, and data and agree.      Results from last 7 days  Lab Units 17  2143   WBC 10*3/mm3 7.35 8.03  --    HEMOGLOBIN g/dL 7.4* 8.2* 9.9*   HEMATOCRIT % 22.5* 24.5* 29.3*   PLATELETS 10*3/mm3 183 174  --        Results from last 7 days  Lab Units 17  0734   SODIUM mmol/L 138 136   POTASSIUM mmol/L 4.5 4.3   CHLORIDE mmol/L 107 105   CO2 mmol/L 29.0 28.0   BUN mg/dL 6* 7*   CREATININE mg/dL 0.50* 0.60   GLUCOSE mg/dL 92 141*   CALCIUM mg/dL 8.7 8.2*     No results found for: BNP  No results found for: PHART    Microbiology Results Abnormal     None          Imaging Results (last 24 hours)     ** No results found for the last 24 hours. **             I have reviewed the  medications.    Assessment/Plan   Assessment / Plan     Hospital Problem List     Crohn disease             Brief Hospital Course to date:  Gloria Smith is a 42 y.o. female POD 2 from ileal-cecal resection for Crohns.       Assessment & Plan:  Postop anemia  -- follow  -- will need iron nreplacement - day 2  ferrlecit  --  b12 and folate okay     Pain control  -- stable.  Increased dilaudid yest - she would like more but I will defer to dr dave del rio prophylaxis  -- IS, mobilize     Hypothyroid  -- continue home meds     vte proph - hep sq    DVT Prophylaxis:      CODE STATUS: Full Code    Disposition: I expect the patient to be discharged home in 1 days.  Would start oral iron after she is off pain meds.     Lindsay Dickinson MD  11/08/17  2:37 PM

## 2017-11-09 LAB
DEPRECATED RDW RBC AUTO: 51 FL (ref 37–54)
ERYTHROCYTE [DISTWIDTH] IN BLOOD BY AUTOMATED COUNT: 13.5 % (ref 11.3–14.5)
HCT VFR BLD AUTO: 19.5 % (ref 34.5–44)
HCT VFR BLD AUTO: 28.5 % (ref 34.5–44)
HGB BLD-MCNC: 6.3 G/DL (ref 11.5–15.5)
HGB BLD-MCNC: 9.4 G/DL (ref 11.5–15.5)
MCH RBC QN AUTO: 33.9 PG (ref 27–31)
MCHC RBC AUTO-ENTMCNC: 32.3 G/DL (ref 32–36)
MCV RBC AUTO: 104.8 FL (ref 80–99)
PLATELET # BLD AUTO: 157 10*3/MM3 (ref 150–450)
PMV BLD AUTO: 9.8 FL (ref 6–12)
RBC # BLD AUTO: 1.86 10*6/MM3 (ref 3.89–5.14)
WBC NRBC COR # BLD: 4.94 10*3/MM3 (ref 3.5–10.8)

## 2017-11-09 PROCEDURE — 99232 SBSQ HOSP IP/OBS MODERATE 35: CPT | Performed by: INTERNAL MEDICINE

## 2017-11-09 PROCEDURE — 85018 HEMOGLOBIN: CPT | Performed by: COLON & RECTAL SURGERY

## 2017-11-09 PROCEDURE — 85027 COMPLETE CBC AUTOMATED: CPT | Performed by: INTERNAL MEDICINE

## 2017-11-09 PROCEDURE — 25010000002 MORPHINE PER 10 MG: Performed by: COLON & RECTAL SURGERY

## 2017-11-09 PROCEDURE — 36430 TRANSFUSION BLD/BLD COMPNT: CPT

## 2017-11-09 PROCEDURE — P9016 RBC LEUKOCYTES REDUCED: HCPCS

## 2017-11-09 PROCEDURE — 25010000002 KETOROLAC TROMETHAMINE PER 15 MG: Performed by: COLON & RECTAL SURGERY

## 2017-11-09 PROCEDURE — 86900 BLOOD TYPING SEROLOGIC ABO: CPT

## 2017-11-09 PROCEDURE — 25010000002 HEPARIN (PORCINE) PER 1000 UNITS: Performed by: COLON & RECTAL SURGERY

## 2017-11-09 PROCEDURE — 25010000002 ONDANSETRON PER 1 MG: Performed by: COLON & RECTAL SURGERY

## 2017-11-09 PROCEDURE — 85014 HEMATOCRIT: CPT | Performed by: COLON & RECTAL SURGERY

## 2017-11-09 RX ORDER — KETOROLAC TROMETHAMINE 30 MG/ML
30 INJECTION, SOLUTION INTRAMUSCULAR; INTRAVENOUS EVERY 8 HOURS PRN
Status: DISCONTINUED | OUTPATIENT
Start: 2017-11-09 | End: 2017-11-13 | Stop reason: HOSPADM

## 2017-11-09 RX ORDER — DIAZEPAM 5 MG/1
5 TABLET ORAL ONCE
Status: COMPLETED | OUTPATIENT
Start: 2017-11-09 | End: 2017-11-09

## 2017-11-09 RX ORDER — SIMETHICONE 80 MG
80 TABLET,CHEWABLE ORAL 4 TIMES DAILY PRN
Status: DISCONTINUED | OUTPATIENT
Start: 2017-11-09 | End: 2017-11-13 | Stop reason: HOSPADM

## 2017-11-09 RX ORDER — MORPHINE SULFATE 4 MG/ML
1 INJECTION, SOLUTION INTRAMUSCULAR; INTRAVENOUS ONCE
Status: COMPLETED | OUTPATIENT
Start: 2017-11-09 | End: 2017-11-09

## 2017-11-09 RX ADMIN — FAMOTIDINE 20 MG: 20 TABLET, FILM COATED ORAL at 19:41

## 2017-11-09 RX ADMIN — HEPARIN SODIUM 5000 UNITS: 5000 INJECTION, SOLUTION INTRAVENOUS; SUBCUTANEOUS at 05:05

## 2017-11-09 RX ADMIN — ALVIMOPAN 12 MG: 12 CAPSULE ORAL at 08:36

## 2017-11-09 RX ADMIN — ONDANSETRON 4 MG: 2 INJECTION INTRAMUSCULAR; INTRAVENOUS at 03:04

## 2017-11-09 RX ADMIN — DIAZEPAM 5 MG: 5 TABLET ORAL at 00:14

## 2017-11-09 RX ADMIN — DIAZEPAM 5 MG: 5 TABLET ORAL at 19:41

## 2017-11-09 RX ADMIN — HYDROCODONE BITARTRATE AND ACETAMINOPHEN 1 TABLET: 7.5; 325 TABLET ORAL at 05:15

## 2017-11-09 RX ADMIN — KETOROLAC TROMETHAMINE 30 MG: 30 INJECTION, SOLUTION INTRAMUSCULAR at 03:25

## 2017-11-09 RX ADMIN — ESCITALOPRAM OXALATE 10 MG: 10 TABLET ORAL at 08:36

## 2017-11-09 RX ADMIN — CETIRIZINE HYDROCHLORIDE 10 MG: 10 TABLET, FILM COATED ORAL at 08:36

## 2017-11-09 RX ADMIN — ONDANSETRON 4 MG: 2 INJECTION INTRAMUSCULAR; INTRAVENOUS at 19:58

## 2017-11-09 RX ADMIN — HYDROCODONE BITARTRATE AND ACETAMINOPHEN 1 TABLET: 7.5; 325 TABLET ORAL at 22:14

## 2017-11-09 RX ADMIN — FLUTICASONE PROPIONATE 2 SPRAY: 50 SPRAY, METERED NASAL at 08:36

## 2017-11-09 RX ADMIN — ACETAMINOPHEN 1000 MG: 500 TABLET ORAL at 00:00

## 2017-11-09 RX ADMIN — FAMOTIDINE 20 MG: 20 TABLET, FILM COATED ORAL at 08:36

## 2017-11-09 RX ADMIN — MORPHINE SULFATE 1 MG: 4 INJECTION, SOLUTION INTRAMUSCULAR; INTRAVENOUS at 20:53

## 2017-11-09 RX ADMIN — GABAPENTIN 600 MG: 300 CAPSULE ORAL at 08:36

## 2017-11-09 RX ADMIN — ACETAMINOPHEN 1000 MG: 500 TABLET ORAL at 08:36

## 2017-11-09 RX ADMIN — KETOROLAC TROMETHAMINE 30 MG: 30 INJECTION, SOLUTION INTRAMUSCULAR at 23:32

## 2017-11-09 RX ADMIN — HYDROCODONE BITARTRATE AND ACETAMINOPHEN 1 TABLET: 7.5; 325 TABLET ORAL at 14:39

## 2017-11-09 RX ADMIN — PHENOL 2 SPRAY: 1.5 LIQUID ORAL at 23:57

## 2017-11-09 RX ADMIN — KETOROLAC TROMETHAMINE 30 MG: 30 INJECTION, SOLUTION INTRAMUSCULAR at 14:40

## 2017-11-09 NOTE — PROGRESS NOTES
[facility] Medicine Services  PROGRESS NOTE    Patient Name: Gloria Smith  : 1975  MRN: [id number]    Date of Admission: 2017  Length of Stay: 3  Primary Care Physician: Shaun Taylor MD    Subjective   Subjective     CC:   uncontrolled crohns    HPI:  Pain remains significant and has not improved - bilat lower quadrants and periumbilical.   Had a BM  Passed some bright red blood, not much.  Got a unit this morning  One BM today  Has been up to BR several times.        Review of Systems  Gen- No fevers, chills  CV- No chest pain, palpitations  Resp- No cough, dyspnea  GI-  abd pain persists.    Otherwise ROS is negative except as mentioned in the HPI.    Objective   Objective     Vital Signs:   Temp:  [97.2 °F (36.2 °C)-98.6 °F (37 °C)] 98.6 °F (37 °C)  Heart Rate:  [100-112] 108  Resp:  [16-19] 16  BP: (118-130)/(63-86) 130/84        Physical Exam:  Gen:  WD/WN in bed.  Father present  Neuro: alert and oriented, clear speech, follows commands, grossly nonfocal  HEENT:  NC/AT PERRL, OP benign  Neck:  Supple, no LAD  Heart RRR no murmur, rub, or gallop  Lungs CTA nonlabored  Abd:  Soft,  no rebound or guarding, pos BS.  Midline incision.  Tender diffusely, same as yest.   Extrem:  No c/c/e    Results Reviewed:  I have personally reviewed current lab, radiology, and data and agree.      Results from last 7 days  Lab Units 17  0450 17  03317  0734   WBC 10*3/mm3 4.94 7.35 8.03   HEMOGLOBIN g/dL 6.3* 7.4* 8.2*   HEMATOCRIT % 19.5* 22.5* 24.5*   PLATELETS 10*3/mm3 157 183 174       Results from last 7 days  Lab Units 17  0335 17  0734   SODIUM mmol/L 138 136   POTASSIUM mmol/L 4.5 4.3   CHLORIDE mmol/L 107 105   CO2 mmol/L 29.0 28.0   BUN mg/dL 6* 7*   CREATININE mg/dL 0.50* 0.60   GLUCOSE mg/dL 92 141*   CALCIUM mg/dL 8.7 8.2*     No results found for: BNP  No results found for: PHART    Microbiology Results Abnormal     None          Imaging  Results (last 24 hours)     ** No results found for the last 24 hours. **             I have reviewed the medications.    Assessment/Plan   Assessment / Plan     Hospital Problem List     Crohn disease             Brief Hospital Course to date:  Gloria Smith is a 42 y.o. female POD 2 from ileal-cecal resection for Crohns.       Assessment & Plan:  Postop anemia  -- got one unit today  -- follow  -- will need iron nreplacement - day 3  ferrlecit  --  b12 and folate okay     Pain control  -- stable.   -- a bit concerning that she is not improving.  Watch closely.  If hgb continues to drop, consider re-CT     pulm prophylaxis  -- IS, mobilize     Hypothyroid  -- continue home meds     vte proph - hep sq    DVT Prophylaxis:      CODE STATUS: Full Code    Disposition: I expect the patient to be discharged home in 1 days.  Would start oral iron after she is off pain meds.     Lindsay Dickinson MD  11/09/17  2:57 PM

## 2017-11-09 NOTE — PLAN OF CARE
Problem: Patient Care Overview (Adult)  Goal: Plan of Care Review  Outcome: Ongoing (interventions implemented as appropriate)    11/09/17 1344   Coping/Psychosocial Response Interventions   Plan Of Care Reviewed With patient;caregiver   Patient Care Overview   Progress improving

## 2017-11-09 NOTE — PROGRESS NOTES
Continued Stay Note  [facility]     Patient Name: Gloria Smith  MRN: [id number]  Today's Date: 11/9/2017    Admit Date: 11/6/2017          Discharge Plan       11/09/17 1147    Case Management/Social Work Plan    Additional Comments I did not disturb the pt. No DC needs identified at this time. Will follow as needed.              Discharge Codes     None        Expected Discharge Date and Time     Expected Discharge Date Expected Discharge Time    Nov 8, 2017             Naya Nichols RN

## 2017-11-09 NOTE — PLAN OF CARE
Problem: Patient Care Overview (Adult)  Goal: Plan of Care Review  Outcome: Ongoing (interventions implemented as appropriate)    11/09/17 0403   Coping/Psychosocial Response Interventions   Plan Of Care Reviewed With patient   Patient Care Overview   Progress no change   Outcome Evaluation   Outcome Summary/Follow up Plan VSS. Pt has complained 10/10 pain all throughout shift. I gave dilaudid for severe pain and pt complained of hallucinations. Called MD and ordered toradol q8h. Will continue to monitor pain for rest of shift.        Goal: Adult Individualization and Mutuality  Outcome: Ongoing (interventions implemented as appropriate)  Goal: Discharge Needs Assessment  Outcome: Ongoing (interventions implemented as appropriate)    11/09/17 0403   Discharge Needs Assessment   Concerns To Be Addressed no discharge needs identified   Discharge Disposition still a patient

## 2017-11-10 ENCOUNTER — APPOINTMENT (OUTPATIENT)
Dept: CT IMAGING | Facility: HOSPITAL | Age: 42
End: 2017-11-10

## 2017-11-10 LAB
ABO + RH BLD: NORMAL
ANION GAP SERPL CALCULATED.3IONS-SCNC: 5 MMOL/L (ref 3–11)
BH BB BLOOD EXPIRATION DATE: NORMAL
BH BB BLOOD TYPE BARCODE: 6200
BH BB DISPENSE STATUS: NORMAL
BH BB PRODUCT CODE: NORMAL
BH BB UNIT NUMBER: NORMAL
BUN BLD-MCNC: 5 MG/DL (ref 9–23)
BUN/CREAT SERPL: 10 (ref 7–25)
CALCIUM SPEC-SCNC: 8.3 MG/DL (ref 8.7–10.4)
CHLORIDE SERPL-SCNC: 103 MMOL/L (ref 99–109)
CO2 SERPL-SCNC: 30 MMOL/L (ref 20–31)
CREAT BLD-MCNC: 0.5 MG/DL (ref 0.6–1.3)
DEPRECATED RDW RBC AUTO: 62.5 FL (ref 37–54)
ERYTHROCYTE [DISTWIDTH] IN BLOOD BY AUTOMATED COUNT: 17.3 % (ref 11.3–14.5)
GFR SERPL CREATININE-BSD FRML MDRD: 134 ML/MIN/1.73
GLUCOSE BLD-MCNC: 85 MG/DL (ref 70–100)
HCT VFR BLD AUTO: 23.4 % (ref 34.5–44)
HGB BLD-MCNC: 7.8 G/DL (ref 11.5–15.5)
MCH RBC QN AUTO: 33.5 PG (ref 27–31)
MCHC RBC AUTO-ENTMCNC: 33.3 G/DL (ref 32–36)
MCV RBC AUTO: 100.4 FL (ref 80–99)
PLATELET # BLD AUTO: 164 10*3/MM3 (ref 150–450)
PMV BLD AUTO: 9.5 FL (ref 6–12)
POTASSIUM BLD-SCNC: 4 MMOL/L (ref 3.5–5.5)
RBC # BLD AUTO: 2.33 10*6/MM3 (ref 3.89–5.14)
SODIUM BLD-SCNC: 138 MMOL/L (ref 132–146)
UNIT  ABO: NORMAL
UNIT  RH: NORMAL
WBC NRBC COR # BLD: 4.32 10*3/MM3 (ref 3.5–10.8)

## 2017-11-10 PROCEDURE — 80048 BASIC METABOLIC PNL TOTAL CA: CPT | Performed by: INTERNAL MEDICINE

## 2017-11-10 PROCEDURE — 99232 SBSQ HOSP IP/OBS MODERATE 35: CPT | Performed by: INTERNAL MEDICINE

## 2017-11-10 PROCEDURE — 25010000002 MORPHINE PER 10 MG: Performed by: COLON & RECTAL SURGERY

## 2017-11-10 PROCEDURE — 25010000002 KETOROLAC TROMETHAMINE PER 15 MG: Performed by: COLON & RECTAL SURGERY

## 2017-11-10 PROCEDURE — 85027 COMPLETE CBC AUTOMATED: CPT | Performed by: INTERNAL MEDICINE

## 2017-11-10 PROCEDURE — 0 IOPAMIDOL 61 % SOLUTION: Performed by: COLON & RECTAL SURGERY

## 2017-11-10 PROCEDURE — 0 DIATRIZOATE MEGLUMINE & SODIUM PER 1 ML

## 2017-11-10 PROCEDURE — 74177 CT ABD & PELVIS W/CONTRAST: CPT

## 2017-11-10 RX ORDER — MORPHINE SULFATE 4 MG/ML
2 INJECTION, SOLUTION INTRAMUSCULAR; INTRAVENOUS
Status: DISCONTINUED | OUTPATIENT
Start: 2017-11-10 | End: 2017-11-10

## 2017-11-10 RX ORDER — PYRIDOSTIGMINE BROMIDE 60 MG/1
30 TABLET ORAL EVERY 8 HOURS SCHEDULED
Status: DISCONTINUED | OUTPATIENT
Start: 2017-11-10 | End: 2017-11-13 | Stop reason: HOSPADM

## 2017-11-10 RX ORDER — MORPHINE SULFATE 4 MG/ML
2 INJECTION, SOLUTION INTRAMUSCULAR; INTRAVENOUS
Status: DISCONTINUED | OUTPATIENT
Start: 2017-11-10 | End: 2017-11-13 | Stop reason: HOSPADM

## 2017-11-10 RX ORDER — SODIUM CHLORIDE 9 MG/ML
100 INJECTION, SOLUTION INTRAVENOUS CONTINUOUS
Status: DISCONTINUED | OUTPATIENT
Start: 2017-11-10 | End: 2017-11-10

## 2017-11-10 RX ADMIN — SODIUM CHLORIDE 100 ML/HR: 9 INJECTION, SOLUTION INTRAVENOUS at 02:06

## 2017-11-10 RX ADMIN — FAMOTIDINE 20 MG: 20 TABLET, FILM COATED ORAL at 11:30

## 2017-11-10 RX ADMIN — Medication: at 08:15

## 2017-11-10 RX ADMIN — IOPAMIDOL 90 ML: 612 INJECTION, SOLUTION INTRAVENOUS at 10:59

## 2017-11-10 RX ADMIN — ESCITALOPRAM OXALATE 10 MG: 10 TABLET ORAL at 11:30

## 2017-11-10 RX ADMIN — KETOROLAC TROMETHAMINE 30 MG: 30 INJECTION, SOLUTION INTRAMUSCULAR at 11:30

## 2017-11-10 RX ADMIN — MORPHINE SULFATE 2 MG: 4 INJECTION, SOLUTION INTRAMUSCULAR; INTRAVENOUS at 05:20

## 2017-11-10 RX ADMIN — KETOROLAC TROMETHAMINE 30 MG: 30 INJECTION, SOLUTION INTRAMUSCULAR at 21:43

## 2017-11-10 RX ADMIN — HYDROCODONE BITARTRATE AND ACETAMINOPHEN 1 TABLET: 7.5; 325 TABLET ORAL at 16:20

## 2017-11-10 RX ADMIN — DIAZEPAM 5 MG: 5 TABLET ORAL at 21:39

## 2017-11-10 RX ADMIN — MORPHINE SULFATE 2 MG: 4 INJECTION, SOLUTION INTRAMUSCULAR; INTRAVENOUS at 18:32

## 2017-11-10 RX ADMIN — MORPHINE SULFATE 2 MG: 4 INJECTION, SOLUTION INTRAMUSCULAR; INTRAVENOUS at 02:17

## 2017-11-10 RX ADMIN — DIATRIZOATE MEGLUMINE AND DIATRIZOATE SODIUM 120 ML: 660; 100 LIQUID ORAL; RECTAL at 08:23

## 2017-11-10 RX ADMIN — FAMOTIDINE 20 MG: 20 TABLET, FILM COATED ORAL at 17:57

## 2017-11-10 RX ADMIN — PYRIDOSTIGMINE BROMIDE 30 MG: 60 TABLET ORAL at 23:02

## 2017-11-10 RX ADMIN — SODIUM CHLORIDE 100 ML/HR: 9 INJECTION, SOLUTION INTRAVENOUS at 12:15

## 2017-11-10 RX ADMIN — DIAZEPAM 5 MG: 5 TABLET ORAL at 09:57

## 2017-11-10 RX ADMIN — MORPHINE SULFATE 2 MG: 4 INJECTION, SOLUTION INTRAMUSCULAR; INTRAVENOUS at 21:44

## 2017-11-10 NOTE — PLAN OF CARE
Problem: Patient Care Overview (Adult)  Goal: Plan of Care Review  Outcome: Ongoing (interventions implemented as appropriate)    11/10/17 0432   Coping/Psychosocial Response Interventions   Plan Of Care Reviewed With patient;spouse   Patient Care Overview   Progress improving   Outcome Evaluation   Outcome Summary/Follow up Plan VSS. Pt was vomitting at the beginning of shift and abd was soft and distended. MD ordered NG. No other complaints of nausea after insertion. Pt became febrile(99.2F) and tachycardic at 0000. Called MD and ordered fluids and morphine qhour for pain. Pt has been asleep and HR has lowered. Afebrile as of now. No other complaints at this time. Javier continue to monitor.        Goal: Adult Individualization and Mutuality  Outcome: Ongoing (interventions implemented as appropriate)  Goal: Discharge Needs Assessment  Outcome: Ongoing (interventions implemented as appropriate)    11/10/17 5132   Discharge Needs Assessment   Concerns To Be Addressed no discharge needs identified   Discharge Disposition still a patient

## 2017-11-10 NOTE — PROGRESS NOTES
Continued Stay Note  [facility]     Patient Name: Gloria Smith  MRN: [id number]  Today's Date: 11/10/2017    Admit Date: 11/6/2017          Discharge Plan       11/10/17 1229    Case Management/Social Work Plan    Plan Home    Patient/Family In Agreement With Plan other (see comments)    Additional Comments Pt has NG at this time and not medically ready for d/c. Plan remains to go home at d/c. CM will cont to follow.               Discharge Codes     None        Expected Discharge Date and Time     Expected Discharge Date Expected Discharge Time    Nov 8, 2017             Bhavani Alvarado

## 2017-11-10 NOTE — PROGRESS NOTES
"Colon and Rectal [CSGA]    POD # 4    /92 (BP Location: Right arm, Patient Position: Lying)  Pulse 104  Temp 98.8 °F (37.1 °C) (Oral)   Resp 16  Ht 64\" (162.6 cm)  Wt 196 lb (88.9 kg)  LMP 09/06/2017  SpO2 90%  BMI 33.64 kg/m2    Lab Results (last 24 hours)     Procedure Component Value Units Date/Time    Hemoglobin & Hematocrit, Blood [[id number]]  (Abnormal) Collected:  11/09/17 1942    Specimen:  Blood Updated:  11/09/17 2028     Hemoglobin 9.4 (L) g/dL      Hematocrit 28.5 (L) %     CBC (No Diff) [[id number]]  (Abnormal) Collected:  11/10/17 0438    Specimen:  Blood Updated:  11/10/17 0522     WBC 4.32 10*3/mm3      RBC 2.33 (L) 10*6/mm3      Hemoglobin 7.8 (L) g/dL      Hematocrit 23.4 (L) %      .4 (H) fL      MCH 33.5 (H) pg      MCHC 33.3 g/dL      RDW 17.3 (H) %      RDW-SD 62.5 (H) fl      MPV 9.5 fL      Platelets 164 10*3/mm3     Basic Metabolic Panel [[id number]]  (Abnormal) Collected:  11/10/17 0438    Specimen:  Blood Updated:  11/10/17 0530     Glucose 85 mg/dL      BUN 5 (L) mg/dL      Creatinine 0.50 (L) mg/dL      Sodium 138 mmol/L      Potassium 4.0 mmol/L      Chloride 103 mmol/L      CO2 30.0 mmol/L      Calcium 8.3 (L) mg/dL      eGFR Non African Amer 134 mL/min/1.73      BUN/Creatinine Ratio 10.0     Anion Gap 5.0 mmol/L     Narrative:       National Kidney Foundation Guidelines    Stage     Description        GFR  1         Normal or High     90+  2         Mild decrease      60-89  3         Moderate decrease  30-59  4         Severe decrease    15-29  5         Kidney failure     <15          I/O this shift:  In: 643 [I.V.:643]  Out: 400 [Urine:400]    Alert and oriented.  No nausea or vomiting With the NG tube in.  Bilious drainage.  Abdomen much softer after the NG tube decompression..  Good pain control and sleeping well.  Hemoglobin 7.8 after 1 unit of blood.  Normal white count.  Good UO.  Small stool with some blood.  Clamp NG tube and see how she does with that.  " Continue ambulation.  Transfuse in the morning if need be.    Order Name Source Comment Collection Info Order Time   OR POTASSIUM   Collected By: Paulette Doan RN 11/6/2017 11:39 AM   HEMOGLOBIN AND HEMATOCRIT, BLOOD   Collected By: Delilah Leung RN 11/6/2017  3:15 PM   TYPE AND SCREEN   Collected By: Lisa Neri 11/6/2017  3:09 PM   TISSUE PATHOLOGY EXAM Large Intestine, Cecum  Collected By: Ajith Mcduffie MD 11/6/2017  3:41 PM   .    Ajith Mcduffie MD  11/10/17  3:04 PM

## 2017-11-10 NOTE — PROGRESS NOTES
[facility] Medicine Services  PROGRESS NOTE    Patient Name: Gloria Smith  : 1975  MRN: [id number]    Date of Admission: 2017  Length of Stay: 4  Primary Care Physician: Shaun Taylor MD    Subjective   Subjective     CC:   uncontrolled crohns    HPI:  Pain remains.  Passed a bit more blood yest but not enough to acount for ongoing hgb drop.   Has been up walking  Profuse vomiting last night; ngt placed    Review of Systems  Gen- No fevers, chills  CV- No chest pain, palpitations  Resp- No cough, dyspnea  GI-  abd pain persists.    Otherwise ROS is negative except as mentioned in the HPI.    Objective   Objective     Vital Signs:   Temp:  [97.2 °F (36.2 °C)-99.2 °F (37.3 °C)] 97.7 °F (36.5 °C)  Heart Rate:  [100-132] 115  Resp:  [16-20] 17  BP: (122-146)/(79-99) 146/98        Physical Exam:  Gen:  WD/WN iup walking slowly in halls.  Lies down without assistance  Neuro: alert and oriented, clear speech, follows commands, grossly nonfocal  HEENT:  NC/AT PERRL, OP benign, NGT in.   Neck:  Supple, no LAD  Heart RRR no murmur, rub, or gallop  Lungs CTA nonlabored  Abd:  Soft,  no rebound or guarding, pos BS.  Midline incision.  Tender diffusely, same as yest.   Extrem:  No c/c/e    Results Reviewed:  I have personally reviewed current lab, radiology, and data and agree.      Results from last 7 days  Lab Units 11/10/17  0438 11/09/17  1942 11/09/17  0450 11/08/17  0335   WBC 10*3/mm3 4.32  --  4.94 7.35   HEMOGLOBIN g/dL 7.8* 9.4* 6.3* 7.4*   HEMATOCRIT % 23.4* 28.5* 19.5* 22.5*   PLATELETS 10*3/mm3 164  --  157 183       Results from last 7 days  Lab Units 11/10/17  0438 11/08/17  0335 11/07/17  0734   SODIUM mmol/L 138 138 136   POTASSIUM mmol/L 4.0 4.5 4.3   CHLORIDE mmol/L 103 107 105   CO2 mmol/L 30.0 29.0 28.0   BUN mg/dL 5* 6* 7*   CREATININE mg/dL 0.50* 0.50* 0.60   GLUCOSE mg/dL 85 92 141*   CALCIUM mg/dL 8.3* 8.7 8.2*     No results found for: BNP  No results found  for: PHART    Microbiology Results Abnormal     None          Imaging Results (last 24 hours)     ** No results found for the last 24 hours. **             I have reviewed the medications.    Assessment/Plan   Assessment / Plan     Hospital Problem List     Crohn disease             Brief Hospital Course to date:  Gloria Smith is a 42 y.o. female POD 4 from ileal-cecal resection for Crohns.       Assessment & Plan:  Postop anemia  -- transfused 11/9 but down again:  Suspect onging slow bleed  -- CT this morning  -- iron nreplacement -   ferrlecit  --  b12 and folate okay     Pain control  -- stable.   -- ongoing pain likely due to bleeding    Nausea  -- NGT placed last night     pulm prophylaxis  -- IS, mobilize     Hypothyroid  -- continue home meds     vte proph - hep sq    DVT Prophylaxis:      CODE STATUS: Full Code    Disposition: I expect the patient to be discharged home in 1 days.  Would start oral iron after she is off pain meds.     Lindsay Dickinson MD  11/10/17  9:29 AM

## 2017-11-10 NOTE — PROGRESS NOTES
"Colon and Rectal [CSGA]    POD # 3    /84  Pulse 108  Temp 98.6 °F (37 °C) (Oral)   Resp 16  Ht 64\" (162.6 cm)  Wt 196 lb (88.9 kg)  LMP 09/06/2017  SpO2 93%  BMI 33.64 kg/m2    Lab Results (last 24 hours)     Procedure Component Value Units Date/Time    CBC (No Diff) [[id number]]  (Abnormal) Collected:  11/09/17 0450    Specimen:  Blood Updated:  11/09/17 0618     WBC 4.94 10*3/mm3      RBC 1.86 (L) 10*6/mm3      Hemoglobin 6.3 (L) g/dL      Hematocrit 19.5 (L) %      .8 (H) fL      MCH 33.9 (H) pg      MCHC 32.3 g/dL      RDW 13.5 %      RDW-SD 51.0 fl      MPV 9.8 fL      Platelets 157 10*3/mm3                Alert and oriented.  Emesis today and still distended and tympanic so we will put an NG tube overnight.  Still uncomfortable but no peritoneal signs.  Overall her abdomen is much softer than even yesterday.  Excellent urine output.  2 bloody stools indicating some bleeding from the anastomosis.  She may also have intra-abdominal bleeding must be very slow.  Recheck H&H and possibly give a unit of blood.  Mylicon to help pass gas.    Order Name Source Comment Collection Info Order Time   OR POTASSIUM   Collected By: Paulette Doan RN 11/6/2017 11:39 AM   HEMOGLOBIN AND HEMATOCRIT, BLOOD   Collected By: Delilah Leung RN 11/6/2017  3:15 PM   TYPE AND SCREEN   Collected By: Lisa Neri 11/6/2017  3:09 PM   TISSUE PATHOLOGY EXAM Large Intestine, Cecum  Collected By: Ajith Mcduffie MD 11/6/2017  3:41 PM   .    Ajith Mcduffie MD  11/09/17  7:10 PM    "

## 2017-11-10 NOTE — PLAN OF CARE
Problem: Pain, Acute (Adult)  Goal: Identify Related Risk Factors and Signs and Symptoms  Outcome: Ongoing (interventions implemented as appropriate)    11/10/17 0442   Pain, Acute   Related Risk Factors (Acute Pain) persistent pain;procedure/treatment;surgery;positioning   Signs and Symptoms (Acute Pain) nausea/vomiting/anorexia       Goal: Acceptable Pain Control/Comfort Level  Outcome: Ongoing (interventions implemented as appropriate)    11/10/17 0442   Pain, Acute (Adult)   Acceptable Pain Control/Comfort Level making progress toward outcome

## 2017-11-10 NOTE — PLAN OF CARE
Problem: Patient Care Overview (Adult)  Goal: Plan of Care Review  Outcome: Ongoing (interventions implemented as appropriate)    11/10/17 0438 11/10/17 1620   Coping/Psychosocial Response Interventions   Plan Of Care Reviewed With --  father   Patient Care Overview   Progress improving --        Goal: Discharge Needs Assessment  Outcome: Ongoing (interventions implemented as appropriate)    11/06/17 1201 11/07/17 1145 11/10/17 0438   Discharge Needs Assessment   Concerns To Be Addressed --  --  no discharge needs identified   Community Agency Name(S) --  No HH involved --    Equipment Needed After Discharge --  none --    Discharge Disposition --  --  still a patient   Living Environment   Transportation Available family or friend will provide;car --  --    Self-Care   Equipment Currently Used at Home --  none --          Problem: Pain, Acute (Adult)  Goal: Identify Related Risk Factors and Signs and Symptoms  Outcome: Outcome(s) achieved Date Met:  11/10/17    11/10/17 0442   Pain, Acute   Related Risk Factors (Acute Pain) persistent pain;procedure/treatment;surgery;positioning   Signs and Symptoms (Acute Pain) nausea/vomiting/anorexia       Goal: Acceptable Pain Control/Comfort Level  Outcome: Ongoing (interventions implemented as appropriate)    11/10/17 1648   Pain, Acute (Adult)   Acceptable Pain Control/Comfort Level making progress toward outcome

## 2017-11-11 PROBLEM — K56.7 ILEUS, POSTOPERATIVE (HCC): Status: ACTIVE | Noted: 2017-11-11

## 2017-11-11 PROBLEM — R11.0 NAUSEA: Status: ACTIVE | Noted: 2017-11-11

## 2017-11-11 PROBLEM — I10 HYPERTENSION: Status: ACTIVE | Noted: 2017-11-11

## 2017-11-11 PROBLEM — D64.9 ANEMIA: Status: ACTIVE | Noted: 2017-11-11

## 2017-11-11 PROBLEM — K91.89 ILEUS, POSTOPERATIVE (HCC): Status: ACTIVE | Noted: 2017-11-11

## 2017-11-11 LAB
ALBUMIN SERPL-MCNC: 3.1 G/DL (ref 3.2–4.8)
ALBUMIN/GLOB SERPL: 1.6 G/DL (ref 1.5–2.5)
ALP SERPL-CCNC: 48 U/L (ref 25–100)
ALT SERPL W P-5'-P-CCNC: 28 U/L (ref 7–40)
ANION GAP SERPL CALCULATED.3IONS-SCNC: 9 MMOL/L (ref 3–11)
AST SERPL-CCNC: 29 U/L (ref 0–33)
BILIRUB SERPL-MCNC: 0.6 MG/DL (ref 0.3–1.2)
BUN BLD-MCNC: 5 MG/DL (ref 9–23)
BUN/CREAT SERPL: 12.5 (ref 7–25)
CALCIUM SPEC-SCNC: 8.2 MG/DL (ref 8.7–10.4)
CHLORIDE SERPL-SCNC: 98 MMOL/L (ref 99–109)
CO2 SERPL-SCNC: 31 MMOL/L (ref 20–31)
CREAT BLD-MCNC: 0.4 MG/DL (ref 0.6–1.3)
DEPRECATED RDW RBC AUTO: 59.1 FL (ref 37–54)
ERYTHROCYTE [DISTWIDTH] IN BLOOD BY AUTOMATED COUNT: 16.3 % (ref 11.3–14.5)
GFR SERPL CREATININE-BSD FRML MDRD: >150 ML/MIN/1.73
GLOBULIN UR ELPH-MCNC: 1.9 GM/DL
GLUCOSE BLD-MCNC: 84 MG/DL (ref 70–100)
HCT VFR BLD AUTO: 25.7 % (ref 34.5–44)
HGB BLD-MCNC: 8.2 G/DL (ref 11.5–15.5)
MCH RBC QN AUTO: 32.2 PG (ref 27–31)
MCHC RBC AUTO-ENTMCNC: 31.9 G/DL (ref 32–36)
MCV RBC AUTO: 100.8 FL (ref 80–99)
PLATELET # BLD AUTO: 184 10*3/MM3 (ref 150–450)
PMV BLD AUTO: 9.3 FL (ref 6–12)
POTASSIUM BLD-SCNC: 3.9 MMOL/L (ref 3.5–5.5)
PROT SERPL-MCNC: 5 G/DL (ref 5.7–8.2)
RBC # BLD AUTO: 2.55 10*6/MM3 (ref 3.89–5.14)
SODIUM BLD-SCNC: 138 MMOL/L (ref 132–146)
WBC NRBC COR # BLD: 4.29 10*3/MM3 (ref 3.5–10.8)

## 2017-11-11 PROCEDURE — 80053 COMPREHEN METABOLIC PANEL: CPT | Performed by: COLON & RECTAL SURGERY

## 2017-11-11 PROCEDURE — 99233 SBSQ HOSP IP/OBS HIGH 50: CPT | Performed by: NURSE PRACTITIONER

## 2017-11-11 PROCEDURE — 85027 COMPLETE CBC AUTOMATED: CPT | Performed by: INTERNAL MEDICINE

## 2017-11-11 PROCEDURE — 25010000002 KETOROLAC TROMETHAMINE PER 15 MG: Performed by: COLON & RECTAL SURGERY

## 2017-11-11 PROCEDURE — 25010000002 MORPHINE PER 10 MG: Performed by: COLON & RECTAL SURGERY

## 2017-11-11 RX ORDER — METOPROLOL SUCCINATE 25 MG/1
25 TABLET, EXTENDED RELEASE ORAL
Status: DISCONTINUED | OUTPATIENT
Start: 2017-11-11 | End: 2017-11-13 | Stop reason: HOSPADM

## 2017-11-11 RX ADMIN — MORPHINE SULFATE 2 MG: 4 INJECTION, SOLUTION INTRAMUSCULAR; INTRAVENOUS at 13:13

## 2017-11-11 RX ADMIN — PYRIDOSTIGMINE BROMIDE 30 MG: 60 TABLET ORAL at 15:41

## 2017-11-11 RX ADMIN — FLUTICASONE PROPIONATE 2 SPRAY: 50 SPRAY, METERED NASAL at 09:14

## 2017-11-11 RX ADMIN — HYDROCODONE BITARTRATE AND ACETAMINOPHEN 1 TABLET: 7.5; 325 TABLET ORAL at 00:47

## 2017-11-11 RX ADMIN — PYRIDOSTIGMINE BROMIDE 30 MG: 60 TABLET ORAL at 21:00

## 2017-11-11 RX ADMIN — MORPHINE SULFATE 2 MG: 4 INJECTION, SOLUTION INTRAMUSCULAR; INTRAVENOUS at 19:45

## 2017-11-11 RX ADMIN — METOPROLOL SUCCINATE 25 MG: 25 TABLET, EXTENDED RELEASE ORAL at 13:06

## 2017-11-11 RX ADMIN — KETOROLAC TROMETHAMINE 30 MG: 30 INJECTION, SOLUTION INTRAMUSCULAR at 15:57

## 2017-11-11 RX ADMIN — CETIRIZINE HYDROCHLORIDE 10 MG: 10 TABLET, FILM COATED ORAL at 09:14

## 2017-11-11 RX ADMIN — MORPHINE SULFATE 2 MG: 4 INJECTION, SOLUTION INTRAMUSCULAR; INTRAVENOUS at 07:54

## 2017-11-11 RX ADMIN — MORPHINE SULFATE 2 MG: 4 INJECTION, SOLUTION INTRAMUSCULAR; INTRAVENOUS at 20:59

## 2017-11-11 RX ADMIN — FAMOTIDINE 20 MG: 20 TABLET, FILM COATED ORAL at 09:14

## 2017-11-11 RX ADMIN — HYDROCODONE BITARTRATE AND ACETAMINOPHEN 1 TABLET: 7.5; 325 TABLET ORAL at 10:34

## 2017-11-11 RX ADMIN — FAMOTIDINE 20 MG: 20 TABLET, FILM COATED ORAL at 17:55

## 2017-11-11 RX ADMIN — ESCITALOPRAM OXALATE 10 MG: 10 TABLET ORAL at 09:14

## 2017-11-11 RX ADMIN — PYRIDOSTIGMINE BROMIDE 30 MG: 60 TABLET ORAL at 09:14

## 2017-11-11 RX ADMIN — MORPHINE SULFATE 2 MG: 4 INJECTION, SOLUTION INTRAMUSCULAR; INTRAVENOUS at 15:58

## 2017-11-11 RX ADMIN — KETOROLAC TROMETHAMINE 30 MG: 30 INJECTION, SOLUTION INTRAMUSCULAR at 07:54

## 2017-11-11 RX ADMIN — HYDROCODONE BITARTRATE AND ACETAMINOPHEN 1 TABLET: 7.5; 325 TABLET ORAL at 17:55

## 2017-11-11 NOTE — PROGRESS NOTES
LOS: 5 days     Subjective     Has left earache.  Is taking some beef broth but is tired of the flavor.  He had a bowel movement today.  Her NG tube was clamped overnight and the residual is 400.  She is back on suction.      Objective     Vital Signs  Vitals:    11/11/17 1100   BP: (!) 152/111   Pulse: 102   Resp: 18   Temp: 97.5 °F (36.4 °C)   SpO2:        Physical Exam:  Abdomen is mildly distended.  It is soft and nontender.        Results Review:        WBC   Date Value Ref Range Status   11/11/2017 4.29 3.50 - 10.80 10*3/mm3 Final   11/10/2017 4.32 3.50 - 10.80 10*3/mm3 Final   11/09/2017 4.94 3.50 - 10.80 10*3/mm3 Final       Hemoglobin   Date Value Ref Range Status   11/11/2017 8.2 (L) 11.5 - 15.5 g/dL Final   11/10/2017 7.8 (L) 11.5 - 15.5 g/dL Final   11/09/2017 9.4 (L) 11.5 - 15.5 g/dL Final   11/09/2017 6.3 (L) 11.5 - 15.5 g/dL Final      HCT Hematocrit   Date Value Ref Range Status   11/11/2017 25.7 (L) 34.5 - 44.0 % Final   11/10/2017 23.4 (L) 34.5 - 44.0 % Final   11/09/2017 28.5 (L) 34.5 - 44.0 % Final   11/09/2017 19.5 (L) 34.5 - 44.0 % Final              Sodium Sodium   Date Value Ref Range Status   11/11/2017 138 132 - 146 mmol/L Final   11/10/2017 138 132 - 146 mmol/L Final      Potassium Potassium   Date Value Ref Range Status   11/11/2017 3.9 3.5 - 5.5 mmol/L Final   11/10/2017 4.0 3.5 - 5.5 mmol/L Final      Chloride Chloride   Date Value Ref Range Status   11/11/2017 98 (L) 99 - 109 mmol/L Final   11/10/2017 103 99 - 109 mmol/L Final          BUN BUN   Date Value Ref Range Status   11/11/2017 5 (L) 9 - 23 mg/dL Final   11/10/2017 5 (L) 9 - 23 mg/dL Final      Creatinine Creatinine   Date Value Ref Range Status   11/11/2017 0.40 (L) 0.60 - 1.30 mg/dL Final   11/10/2017 0.50 (L) 0.60 - 1.30 mg/dL Final      Calcium Calcium   Date Value Ref Range Status   11/11/2017 8.2 (L) 8.7 - 10.4 mg/dL Final   11/10/2017 8.3 (L) 8.7 - 10.4 mg/dL Final        Imaging Results (last 24 hours)      Procedure Component Value Units Date/Time    CT Abdomen Pelvis With Contrast [[id number]] Collected:  11/10/17 1643     Updated:  11/10/17 2233    Narrative:       EXAMINATION: CT ABDOMEN PELVIS W CONTRAST- 11/10/2017     INDICATION: K50.90-Crohn's disease, unspecified, without complications      TECHNIQUE: 5 mm post oral and IV contrast portal venous phase and  delayed venous phase images through the abdomen and pelvis.     The radiation dose reduction device was turned on for each scan per the  ALARA (As Low as Reasonably Achievable) protocol.     COMPARISON: 05/19/2017 abdomen and pelvis CT scan     FINDINGS: Patient history indicates surgery on Monday for Crohn's  disease. Possible bowel obstruction.     On today's exam, the lower lungs appear clear except for trace linear  scarring or discoid atelectasis. There is diffuse fatty liver change.  Trace free fluid is seen in the right paracolic gutter. There are  scattered small bubbles of air not unusual for recent surgery. NG tube  is seen in the stomach. No significant abnormalities are seen of the  spleen, pancreas, adrenal glands, or kidneys. Small bowel loops are  mildly dilated and there are multiple air-fluid levels. Oral contrast  passes to the mid small bowel, but does not opacify the distal small  bowel loops which are seen in the right lower quadrant and midabdomen,  up to the small bowel-right colon anastomosis lying in the anterior  midabdomen. There is no well-defined transition point in the small  bowel. Small bowel appears mildly dilated all the way up to the level of  the anastomosis. No small bowel pneumatosis or mucosal thickening is  seen. The colon is largely decompressed and contains some fluid. No  well-defined inflammatory focus is seen. Bladder is normally distended.  Uterus and ovaries are not enlarged.       Impression:       Mildly dilated small bowel throughout the abdomen, up to  level of the patient's mid abdominal small bowel/colon  anastomosis.  Considerations include low level or early small bowel obstruction at or  near the anastomosis, or mild ileus. Expected postoperative changes  elsewhere.     D:  11/10/2017  E:  11/10/2017     This report was finalized on 11/10/2017 10:31 PM by DR. Chay Murray MD.             Assessment/Plan     Active Problems:    Crohn disease    Patient did not tolerate NG clamping overnight.  She had a 400 mL residual.  She is now moving her bowels.  Will try re-clamping and checking residual.  If her residual diminishes can consider removing NG tube.      Fabrizio Espinoza MD  11/11/17  11:37 AM

## 2017-11-11 NOTE — PROGRESS NOTES
[facility]  PROGRESS NOTE    Patient Name: Gloria Smith  : 1975  MRN: [id number]    Date of Admission: 2017  Length of Stay: 5  Primary Care Physician: Shaun Taylor MD    Subjective   Subjective     CC:   uncontrolled crohns    HPI:  Did not tolerate NG being clamped due to nausea  Complains of sore throat from NG  BM this am without melena/hematochezia    Review of Systems  Gen- No fevers, chills  CV- No chest pain, palpitations  Resp- No cough, dyspnea  GI-  abd pain persists, +nausea      Otherwise ROS is negative except as mentioned in the HPI.    Objective   Objective     Vital Signs:   Temp:  [97.4 °F (36.3 °C)-98.8 °F (37.1 °C)] 97.5 °F (36.4 °C)  Heart Rate:  [102-118] 102  Resp:  [16-18] 18  BP: (139-157)/() 152/111        Physical Exam:  Constitutional: No acute distress, awake, alert  HENT: NCAT, mucous membranes moist  Respiratory: Clear to auscultation bilaterally, respiratory effort normal   Cardiovascular: RRR, no murmurs, rubs, or gallops, palpable pedal pulses bilaterally  Gastrointestinal: Positive bowel sounds, soft, diffuse tenderness, nondistended, surgical dressing c/d/i  Musculoskeletal: No bilateral ankle edema  Psychiatric: Appropriate affect, cooperative  Neurologic: Oriented x 3, strength symmetric in all extremities, Cranial Nerves grossly intact to confrontation, speech clear  Skin: No rashes      Results Reviewed:  I have personally reviewed current lab, radiology, and data and agree.      Results from last 7 days  Lab Units 11/11/17  0536 11/10/17  0438 11/09/17  1942 11/09/17  0450   WBC 10*3/mm3 4.29 4.32  --  4.94   HEMOGLOBIN g/dL 8.2* 7.8* 9.4* 6.3*   HEMATOCRIT % 25.7* 23.4* 28.5* 19.5*   PLATELETS 10*3/mm3 184 164  --  157       Results from last 7 days  Lab Units 17  0536 11/10/17  0438 17  0335   SODIUM mmol/L 138 138 138   POTASSIUM mmol/L 3.9 4.0 4.5   CHLORIDE mmol/L 98* 103 107   CO2 mmol/L  31.0 30.0 29.0   BUN mg/dL 5* 5* 6*   CREATININE mg/dL 0.40* 0.50* 0.50*   GLUCOSE mg/dL 84 85 92   CALCIUM mg/dL 8.2* 8.3* 8.7   ALT (SGPT) U/L 28  --   --    AST (SGOT) U/L 29  --   --        Microbiology Results Abnormal     None          Imaging Results (last 24 hours)     Procedure Component Value Units Date/Time    CT Abdomen Pelvis With Contrast [[id number]] Collected:  11/10/17 1643     Updated:  11/10/17 2233    Narrative:       EXAMINATION: CT ABDOMEN PELVIS W CONTRAST- 11/10/2017     INDICATION: K50.90-Crohn's disease, unspecified, without complications      TECHNIQUE: 5 mm post oral and IV contrast portal venous phase and  delayed venous phase images through the abdomen and pelvis.     The radiation dose reduction device was turned on for each scan per the  ALARA (As Low as Reasonably Achievable) protocol.     COMPARISON: 05/19/2017 abdomen and pelvis CT scan     FINDINGS: Patient history indicates surgery on Monday for Crohn's  disease. Possible bowel obstruction.     On today's exam, the lower lungs appear clear except for trace linear  scarring or discoid atelectasis. There is diffuse fatty liver change.  Trace free fluid is seen in the right paracolic gutter. There are  scattered small bubbles of air not unusual for recent surgery. NG tube  is seen in the stomach. No significant abnormalities are seen of the  spleen, pancreas, adrenal glands, or kidneys. Small bowel loops are  mildly dilated and there are multiple air-fluid levels. Oral contrast  passes to the mid small bowel, but does not opacify the distal small  bowel loops which are seen in the right lower quadrant and midabdomen,  up to the small bowel-right colon anastomosis lying in the anterior  midabdomen. There is no well-defined transition point in the small  bowel. Small bowel appears mildly dilated all the way up to the level of  the anastomosis. No small bowel pneumatosis or mucosal thickening is  seen. The colon is largely decompressed  and contains some fluid. No  well-defined inflammatory focus is seen. Bladder is normally distended.  Uterus and ovaries are not enlarged.       Impression:       Mildly dilated small bowel throughout the abdomen, up to  level of the patient's mid abdominal small bowel/colon anastomosis.  Considerations include low level or early small bowel obstruction at or  near the anastomosis, or mild ileus. Expected postoperative changes  elsewhere.     D:  11/10/2017  E:  11/10/2017     This report was finalized on 11/10/2017 10:31 PM by DR. Chay Murray MD.                I have reviewed the medications.    Assessment/Plan   Assessment / Plan     Hospital Problem List     Crohn disease             Brief Hospital Course to date:  Gloria Smith is a 42 y.o. female s/p ileal-cecal resection for Crohns.       Assessment & Plan:    Crohn's Disease  -- s/p ileocecotomy 11.6.17  -- previously on Remicaide as outpatient    Postop anemia  -- s/p 1 unit PRBC  -- iron nreplacement - ferrlecit [id number]  -- oral iron after dc  -- cbc in am     Pain control  -- stable.   -- ongoing pain likely due to bleeding    Nausea  -- NGT remains in place  -- attempt to clamp again since bowels moving     HTN   -- restart BB    DVT Prophylaxis:  SQ heparin    CODE STATUS: Full Code    Disposition: I expect the patient to be discharged home in 1-2 days.      RASHAD Kelsey  11/11/17  11:56 AM

## 2017-11-12 LAB
BASOPHILS # BLD AUTO: 0.01 10*3/MM3 (ref 0–0.2)
BASOPHILS NFR BLD AUTO: 0.2 % (ref 0–1)
DEPRECATED RDW RBC AUTO: 57.4 FL (ref 37–54)
EOSINOPHIL # BLD AUTO: 0.25 10*3/MM3 (ref 0–0.3)
EOSINOPHIL NFR BLD AUTO: 5.4 % (ref 0–3)
ERYTHROCYTE [DISTWIDTH] IN BLOOD BY AUTOMATED COUNT: 15.8 % (ref 11.3–14.5)
HCT VFR BLD AUTO: 26.5 % (ref 34.5–44)
HGB BLD-MCNC: 8.6 G/DL (ref 11.5–15.5)
IMM GRANULOCYTES # BLD: 0.02 10*3/MM3 (ref 0–0.03)
IMM GRANULOCYTES NFR BLD: 0.4 % (ref 0–0.6)
LYMPHOCYTES # BLD AUTO: 0.89 10*3/MM3 (ref 0.6–4.8)
LYMPHOCYTES NFR BLD AUTO: 19.4 % (ref 24–44)
MCH RBC QN AUTO: 32.7 PG (ref 27–31)
MCHC RBC AUTO-ENTMCNC: 32.5 G/DL (ref 32–36)
MCV RBC AUTO: 100.8 FL (ref 80–99)
MONOCYTES # BLD AUTO: 0.63 10*3/MM3 (ref 0–1)
MONOCYTES NFR BLD AUTO: 13.7 % (ref 0–12)
NEUTROPHILS # BLD AUTO: 2.79 10*3/MM3 (ref 1.5–8.3)
NEUTROPHILS NFR BLD AUTO: 60.9 % (ref 41–71)
PLATELET # BLD AUTO: 215 10*3/MM3 (ref 150–450)
PMV BLD AUTO: 9.5 FL (ref 6–12)
RBC # BLD AUTO: 2.63 10*6/MM3 (ref 3.89–5.14)
WBC NRBC COR # BLD: 4.59 10*3/MM3 (ref 3.5–10.8)

## 2017-11-12 PROCEDURE — 25010000002 MORPHINE PER 10 MG: Performed by: COLON & RECTAL SURGERY

## 2017-11-12 PROCEDURE — 99232 SBSQ HOSP IP/OBS MODERATE 35: CPT | Performed by: INTERNAL MEDICINE

## 2017-11-12 PROCEDURE — 25010000002 KETOROLAC TROMETHAMINE PER 15 MG: Performed by: COLON & RECTAL SURGERY

## 2017-11-12 PROCEDURE — 85025 COMPLETE CBC W/AUTO DIFF WBC: CPT | Performed by: NURSE PRACTITIONER

## 2017-11-12 RX ADMIN — ESCITALOPRAM OXALATE 10 MG: 10 TABLET ORAL at 10:18

## 2017-11-12 RX ADMIN — HYDROCODONE BITARTRATE AND ACETAMINOPHEN 1 TABLET: 7.5; 325 TABLET ORAL at 10:19

## 2017-11-12 RX ADMIN — FAMOTIDINE 20 MG: 20 TABLET, FILM COATED ORAL at 14:10

## 2017-11-12 RX ADMIN — FAMOTIDINE 20 MG: 20 TABLET, FILM COATED ORAL at 10:19

## 2017-11-12 RX ADMIN — KETOROLAC TROMETHAMINE 30 MG: 30 INJECTION, SOLUTION INTRAMUSCULAR at 10:19

## 2017-11-12 RX ADMIN — PYRIDOSTIGMINE BROMIDE 30 MG: 60 TABLET ORAL at 05:36

## 2017-11-12 RX ADMIN — MORPHINE SULFATE 2 MG: 4 INJECTION, SOLUTION INTRAMUSCULAR; INTRAVENOUS at 05:35

## 2017-11-12 RX ADMIN — KETOROLAC TROMETHAMINE 30 MG: 30 INJECTION, SOLUTION INTRAMUSCULAR at 00:35

## 2017-11-12 RX ADMIN — HYDROCODONE BITARTRATE AND ACETAMINOPHEN 1 TABLET: 7.5; 325 TABLET ORAL at 17:22

## 2017-11-12 RX ADMIN — KETOROLAC TROMETHAMINE 30 MG: 30 INJECTION, SOLUTION INTRAMUSCULAR at 21:17

## 2017-11-12 RX ADMIN — PYRIDOSTIGMINE BROMIDE 30 MG: 60 TABLET ORAL at 21:16

## 2017-11-12 RX ADMIN — CETIRIZINE HYDROCHLORIDE 10 MG: 10 TABLET, FILM COATED ORAL at 10:18

## 2017-11-12 RX ADMIN — MORPHINE SULFATE 2 MG: 4 INJECTION, SOLUTION INTRAMUSCULAR; INTRAVENOUS at 14:10

## 2017-11-12 RX ADMIN — SIMETHICONE CHEW TAB 80 MG 80 MG: 80 TABLET ORAL at 10:45

## 2017-11-12 RX ADMIN — MORPHINE SULFATE 2 MG: 4 INJECTION, SOLUTION INTRAMUSCULAR; INTRAVENOUS at 22:06

## 2017-11-12 RX ADMIN — SIMETHICONE CHEW TAB 80 MG 80 MG: 80 TABLET ORAL at 17:22

## 2017-11-12 RX ADMIN — ACETAMINOPHEN 1000 MG: 500 TABLET ORAL at 21:17

## 2017-11-12 RX ADMIN — PYRIDOSTIGMINE BROMIDE 30 MG: 60 TABLET ORAL at 14:10

## 2017-11-12 RX ADMIN — METOPROLOL SUCCINATE 25 MG: 25 TABLET, EXTENDED RELEASE ORAL at 10:18

## 2017-11-12 RX ADMIN — FLUTICASONE PROPIONATE 2 SPRAY: 50 SPRAY, METERED NASAL at 10:18

## 2017-11-12 RX ADMIN — HYDROCODONE BITARTRATE AND ACETAMINOPHEN 1 TABLET: 7.5; 325 TABLET ORAL at 23:09

## 2017-11-12 RX ADMIN — HYDROCODONE BITARTRATE AND ACETAMINOPHEN 1 TABLET: 7.5; 325 TABLET ORAL at 03:56

## 2017-11-12 RX ADMIN — MORPHINE SULFATE 2 MG: 4 INJECTION, SOLUTION INTRAMUSCULAR; INTRAVENOUS at 00:35

## 2017-11-12 NOTE — PLAN OF CARE
Problem: Perioperative Period (Adult)  Intervention: Prevent/Manage DVT/VTE Risk    11/12/17 0448   Support Surgical/Anesthesia Recovery   Venous Thromboembolism Prevent/Manage bilateral;sequential compression devices off;foot pump device off;compression stockings off;ambulation promoted;anticoagulant therapy maintained;bleeding risk assessed;fluids promoted;patient refused intervention(s)

## 2017-11-12 NOTE — PROGRESS NOTES
LOS: 6 days     Subjective     Patient up and ambulating.  She is taking liquids.  NG tube is out.  She is moving her bowels.          Objective     Vital Signs  Vitals:    11/12/17 0714   BP: 136/95   Pulse: 98   Resp: 16   Temp: 98 °F (36.7 °C)   SpO2:        Physical Exam:    Abdomen is mildly distended but soft.  The incision is healing well.          Results Review:       WBC WBC   Date Value Ref Range Status   11/12/2017 4.59 3.50 - 10.80 10*3/mm3 Final   11/11/2017 4.29 3.50 - 10.80 10*3/mm3 Final   11/10/2017 4.32 3.50 - 10.80 10*3/mm3 Final      HGB Hemoglobin   Date Value Ref Range Status   11/12/2017 8.6 (L) 11.5 - 15.5 g/dL Final   11/11/2017 8.2 (L) 11.5 - 15.5 g/dL Final   11/10/2017 7.8 (L) 11.5 - 15.5 g/dL Final   11/09/2017 9.4 (L) 11.5 - 15.5 g/dL Final      HCT Hematocrit   Date Value Ref Range Status   11/12/2017 26.5 (L) 34.5 - 44.0 % Final   11/11/2017 25.7 (L) 34.5 - 44.0 % Final   11/10/2017 23.4 (L) 34.5 - 44.0 % Final   11/09/2017 28.5 (L) 34.5 - 44.0 % Final              Sodium Sodium   Date Value Ref Range Status   11/11/2017 138 132 - 146 mmol/L Final   11/10/2017 138 132 - 146 mmol/L Final      Potassium Potassium   Date Value Ref Range Status   11/11/2017 3.9 3.5 - 5.5 mmol/L Final   11/10/2017 4.0 3.5 - 5.5 mmol/L Final      Chloride Chloride   Date Value Ref Range Status   11/11/2017 98 (L) 99 - 109 mmol/L Final   11/10/2017 103 99 - 109 mmol/L Final      Bicarbonate No results found for: PLASMABICARB   BUN BUN   Date Value Ref Range Status   11/11/2017 5 (L) 9 - 23 mg/dL Final   11/10/2017 5 (L) 9 - 23 mg/dL Final      Creatinine Creatinine   Date Value Ref Range Status   11/11/2017 0.40 (L) 0.60 - 1.30 mg/dL Final   11/10/2017 0.50 (L) 0.60 - 1.30 mg/dL Final      Calcium Calcium   Date Value Ref Range Status   11/11/2017 8.2 (L) 8.7 - 10.4 mg/dL Final   11/10/2017 8.3 (L) 8.7 - 10.4 mg/dL Final        Assessment/Plan     Active Problems:    Crohn disease    Hypertension     Anemia, popst-op    Nausea    Ileus, postoperative      Status post right hemicolectomy for Crohn's disease.  Patient appears to be doing well.  She is now tolerating a diet and moving her bowels.  Plan will be to advance her diet.  If she tolerates that she may be able to go home tomorrow.    Fabrizio Espinoza MD  11/12/17  11:36 AM

## 2017-11-12 NOTE — PLAN OF CARE
Problem: Pain, Acute (Adult)  Goal: Acceptable Pain Control/Comfort Level  Outcome: Ongoing (interventions implemented as appropriate)    11/12/17 6617   Pain, Acute (Adult)   Acceptable Pain Control/Comfort Level making progress toward outcome  (still requiring frequent prn pain meds)

## 2017-11-12 NOTE — PLAN OF CARE
Problem: Perioperative Period (Adult)  Intervention: Promote Effective Elimination    11/12/17 0445   Manage Acute Burn Pain   Bowel Intervention ambulation promoted;adequate fluid intake promoted;privacy promoted   Genitourinary () Interventions   Urinary Elimination Promotion toileting offered

## 2017-11-12 NOTE — PLAN OF CARE
Problem: Perioperative Period (Adult)  Intervention: Monitor/Manage Pain    11/12/17 0446   Manage Acute Burn Pain   Bowel Intervention ambulation promoted;adequate fluid intake promoted;privacy promoted   Pain Management Interventions awakened for pain meds per patient preference;medicated;pain care plan reviewed with patient/caregiver;relaxation techniques promoted;pillow support;premedicated for activity   Safety Interventions   Medication Review/Management medications reviewed;high risk medications identified

## 2017-11-12 NOTE — PLAN OF CARE
Problem: Perioperative Period (Adult)  Intervention: Monitor/Manage Postoperative Bleeding    11/12/17 0449   Safety Interventions   Bleeding Management dressing monitored

## 2017-11-12 NOTE — PLAN OF CARE
Problem: Perioperative Period (Adult)  Intervention: Promote Pulmonary Hygiene and Secretion Clearance    11/12/17 2405   Promote Aggressive Pulmonary Hygiene/Secretion Management   Cough And Deep Breathing done independently per patient   Positioning   Head Of Bed (HOB) Position HOB at 30-45 degrees   Activity   Activity Type activity adjusted per tolerance;bedrest with bathroom privileges   Incentive Spirometer   Administration (Incentive Spirometer) done independently per patient

## 2017-11-12 NOTE — PLAN OF CARE
Problem: Pain, Acute (Adult)  Goal: Acceptable Pain Control/Comfort Level  Outcome: Ongoing (interventions implemented as appropriate)    11/12/17 3024   Pain, Acute (Adult)   Acceptable Pain Control/Comfort Level making progress toward outcome  (still requiring frequent prn pain meds)

## 2017-11-12 NOTE — NURSING NOTE
CONTACTED MIKEY POST NP PER PT REQUEST.  PT WANTED TO ASK IF SHE COULD HAVE HER NG TUBE REMOVED.  NO ORDER WAS RECEIVED FOR REMOVING NG TUBE.  PT WAS NOTIFIED THAT  THE NG TUBE WOULD BE CLAMPED OFF AND THEY COULD RE-EVALUATE THE REMOVAL OF IT IN THE MORNING.  PT STATED SHE DID NOT WANT TO WAIT ANY LONGER, AND SAID FOR NURSING STAFF TO REMOVE IT.  NG TUBE WAS REMOVED PER PROTOCOL.  PT TOLERATED WELL.

## 2017-11-12 NOTE — PLAN OF CARE
Problem: Perioperative Period (Adult)  Intervention: Prevent/Manage Post-surgical Infection    11/12/17 0447   Safety Interventions   Infection Prevention barrier precautions utilized;personal protective equipment utilized;rest/sleep promoted;single patient room provided

## 2017-11-12 NOTE — PLAN OF CARE
Problem: Perioperative Period (Adult)  Intervention: Promote Normothermia    11/12/17 0449   Cardiac Interventions   Cooling Thermoregulation Maintenance lightweight clothing/blankets used

## 2017-11-12 NOTE — PLAN OF CARE
Problem: Perioperative Period (Adult)  Intervention: Prevent Alix-procedural Injury    11/12/17 0447   Positioning   Positioning supine, head elevated   Head Of Bed (HOB) Position HOB at 20-30 degrees

## 2017-11-13 VITALS
SYSTOLIC BLOOD PRESSURE: 127 MMHG | RESPIRATION RATE: 18 BRPM | OXYGEN SATURATION: 90 % | DIASTOLIC BLOOD PRESSURE: 83 MMHG | HEIGHT: 64 IN | TEMPERATURE: 97.3 F | BODY MASS INDEX: 33.46 KG/M2 | HEART RATE: 111 BPM | WEIGHT: 196 LBS

## 2017-11-13 PROBLEM — K56.7 ILEUS, POSTOPERATIVE (HCC): Status: RESOLVED | Noted: 2017-11-11 | Resolved: 2017-11-13

## 2017-11-13 PROBLEM — R11.0 NAUSEA: Status: RESOLVED | Noted: 2017-11-11 | Resolved: 2017-11-13

## 2017-11-13 PROBLEM — K91.89 ILEUS, POSTOPERATIVE (HCC): Status: RESOLVED | Noted: 2017-11-11 | Resolved: 2017-11-13

## 2017-11-13 PROCEDURE — 99231 SBSQ HOSP IP/OBS SF/LOW 25: CPT | Performed by: NURSE PRACTITIONER

## 2017-11-13 PROCEDURE — 25010000002 MORPHINE PER 10 MG: Performed by: COLON & RECTAL SURGERY

## 2017-11-13 PROCEDURE — 25010000002 KETOROLAC TROMETHAMINE PER 15 MG: Performed by: COLON & RECTAL SURGERY

## 2017-11-13 RX ORDER — HYDROCODONE BITARTRATE AND ACETAMINOPHEN 7.5; 325 MG/1; MG/1
1 TABLET ORAL EVERY 6 HOURS PRN
Qty: 30 TABLET | Refills: 0 | Status: SHIPPED | OUTPATIENT
Start: 2017-11-13 | End: 2017-11-18

## 2017-11-13 RX ADMIN — KETOROLAC TROMETHAMINE 30 MG: 30 INJECTION, SOLUTION INTRAMUSCULAR at 05:36

## 2017-11-13 RX ADMIN — ACETAMINOPHEN 1000 MG: 500 TABLET ORAL at 08:13

## 2017-11-13 RX ADMIN — METOPROLOL SUCCINATE 25 MG: 25 TABLET, EXTENDED RELEASE ORAL at 08:13

## 2017-11-13 RX ADMIN — MORPHINE SULFATE 2 MG: 4 INJECTION, SOLUTION INTRAMUSCULAR; INTRAVENOUS at 10:39

## 2017-11-13 RX ADMIN — MORPHINE SULFATE 2 MG: 4 INJECTION, SOLUTION INTRAMUSCULAR; INTRAVENOUS at 08:19

## 2017-11-13 RX ADMIN — HYDROCODONE BITARTRATE AND ACETAMINOPHEN 1 TABLET: 7.5; 325 TABLET ORAL at 12:10

## 2017-11-13 RX ADMIN — PYRIDOSTIGMINE BROMIDE 30 MG: 60 TABLET ORAL at 05:36

## 2017-11-13 RX ADMIN — CETIRIZINE HYDROCHLORIDE 10 MG: 10 TABLET, FILM COATED ORAL at 08:13

## 2017-11-13 RX ADMIN — FLUTICASONE PROPIONATE 2 SPRAY: 50 SPRAY, METERED NASAL at 08:15

## 2017-11-13 RX ADMIN — ESCITALOPRAM OXALATE 10 MG: 10 TABLET ORAL at 08:13

## 2017-11-13 RX ADMIN — MORPHINE SULFATE 2 MG: 4 INJECTION, SOLUTION INTRAMUSCULAR; INTRAVENOUS at 03:38

## 2017-11-13 RX ADMIN — HYDROCODONE BITARTRATE AND ACETAMINOPHEN 1 TABLET: 7.5; 325 TABLET ORAL at 05:36

## 2017-11-13 RX ADMIN — FAMOTIDINE 20 MG: 20 TABLET, FILM COATED ORAL at 08:13

## 2017-11-13 NOTE — DISCHARGE SUMMARY
"Colon and Rectal [[facility]]    Date of Discharge:  11/13/2017    Discharge Diagnosis: Ileocecal Crohn's disease with obstruction    Problem List:  Active Problems:    Crohn disease    Hypertension    Anemia, popst-op      Presenting Problem/History of Present Illness  Crohn disease [K50.90]      Hospital Course  Patient is a 42 y.o. female presented with chronic obstruction from an ileal stenosis from her Crohn's disease.  She was given Humira preop in hopes of quieting her disease down.  Fortunately at the time of the operation there is no sigmoid involvement.  She had impressive swelling of her small bowel mesentery which probably led to requiring 1 unit of blood postop and to a prolonged ileus.  CT scan almost a week postop she showed the ileus.  This responded nicely to Mestinon.  Her bowel function is doing well with diet and bowel movements.  Voiding is good.  She still is sore as expected.  The wound was clean and dry.  We will leave the staples in until I see her next week.  I also recommended 81 mg of aspirin daily its I think she will be hypercoagulable for some time..      Procedures Performed  Procedure(s):  Ileal right colectomy with bilateral salpingo-ectomy       Consults:   Consults     Date and Time Order Name Status Description    11/6/2017 1622 Inpatient Consult to Hospitalist              Condition on Discharge:      Vital Signs  Blood pressure 127/83, pulse 111, temperature 97.3 °F (36.3 °C), temperature source Oral, resp. rate 18, height 64\" (162.6 cm), weight 196 lb (88.9 kg), last menstrual period 09/06/2017, SpO2 90 %.    Discharge Disposition  Home or Self Care    Discharge Medications   Gloria Smith   Home Medication Instructions JANETH:[id number]    Printed on:11/13/17 1136   Medication Information                      cetirizine (zyrTEC) 10 MG tablet  Take 10 mg by mouth Daily.             escitalopram (LEXAPRO) 10 MG tablet  Take 10 mg by mouth Daily.             fluticasone (FLONASE) " 50 MCG/ACT nasal spray  2 sprays into each nostril daily  Administer 2 sprays in each nostril for each dose.             HYDROcodone-acetaminophen (NORCO) 7.5-325 MG per tablet  Take 1 tablet by mouth Every 6 (Six) Hours As Needed for Moderate Pain (4-6).             HYDROcodone-acetaminophen (NORCO) 7.5-325 MG per tablet  Take 1 tablet by mouth Every 6 (Six) Hours As Needed for Moderate Pain  for up to 5 days.             ibuprofen (ADVIL,MOTRIN) 400 MG tablet  Take 400 mg by mouth Every 6 (Six) Hours As Needed for Mild Pain .             InFLIXimab (REMICADE IV)  Infuse 1 Bag into a venous catheter Take As Directed. TWICE MONTHLY             metoprolol succinate XL (TOPROL-XL) 25 MG 24 hr tablet  Take 25 mg by mouth Daily.             raNITIdine (ZANTAC) 150 MG tablet  Take 150 mg by mouth 2 (Two) Times a Day.                 Discharge Diet As tolerated      Activity at Discharge as tolerated      Follow-up Appointments 1 week in my office  Future Appointments  Date Time Provider Department Center   11/17/2017 10:00 AM  [facility]  [facility]   1/12/2018 10:00 AM  [facility] ESTRELL[facility] CHAIR 5  [facility]         Test Results Pending at DischargeNone

## 2017-11-13 NOTE — PROGRESS NOTES
"Adult Nutrition  Assessment/PES    Patient Name:  Gloria Smith  YOB: 1975  MRN: [id number]  Admit Date:  11/6/2017    Assessment Date:  11/13/2017        Reason for Assessment       11/13/17 1338    Reason for Assessment    Reason For Assessment/Visit length of stay    Time Spent (min) 20    Diagnosis Diagnosis    Cardiac HTN    Gastrointestinal Ileus;Nausea;Crohn's disease;Bowel resection   s/p colon resection right (11/6/17); Ileus post op.    Hematological Anemia   post op.              Nutrition/Diet History       11/13/17 1341    Nutrition/Diet History    Reported/Observed By Patient    Appetite Fair    Other Pt reported tolerating solids well so far, does not want to over due it on her first day, has been eating about 25% of her meals today, pt reported consuming about 1-1.5 boosts when on liquids, reported not drinking any boost today. Pt reported no nausea today.            Anthropometrics       11/13/17 1344    Anthropometrics    Height 162.6 cm (64\")    Weight 88.9 kg (196 lb)    Ideal Body Weight (IBW)    Ideal Body Weight (IBW), Female 55.4    % Ideal Body Weight 160.8    Body Mass Index (BMI)    BMI (kg/m2) 33.71            Labs/Tests/Procedures/Meds       11/13/17 1345    Labs/Tests/Procedures/Meds    Labs/Tests Review Reviewed                Nutrition Prescription Ordered       11/13/17 1345    Nutrition Prescription PO    Current PO Diet Regular    Supplement Boost Plus   vanilla    Supplement Frequency 3 times a day    Other Modifiers Low Fiber;Low Residue;Other (comment)   No straws            Evaluation of Received Nutrient/Fluid Intake       11/13/17 1345    PO Evaluation    Number of Meals 7    % PO Intake 39   Pt reported drinking one boost per day.             Problem/Interventions:        Problem 1       11/13/17 1347    Nutrition Diagnoses Problem 1    Problem 1 Inadequate Intake/Infusion    Etiology (related to) Medical Diagnosis   Clinical condition     Signs/Symptoms " (evidenced by) PO Intake    Percent (%) intake recorded 39 %   Pt reported drinking one boost per day.     Over number of meals 7                    Intervention Goal       11/13/17 1348    Intervention Goal    General Nutrition support treatment    PO Increase intake;Tolerate PO            Nutrition Intervention       11/13/17 1348    Nutrition Intervention    RD/Tech Action Advise alternate selection;Advise available snack;Interview for preference;Menu provided;Encourage intake;Follow Tx progress;Care plan reviewd              Education/Evaluation       11/13/17 1348    Monitor/Evaluation    Monitor Per protocol;PO intake;Supplement intake        Electronically signed by:  Jeaneth Alan  11/13/17 1:48 PM

## 2017-11-13 NOTE — PLAN OF CARE
Problem: Patient Care Overview (Adult)  Goal: Plan of Care Review  Outcome: Ongoing (interventions implemented as appropriate)    11/13/17 1152   Coping/Psychosocial Response Interventions   Plan Of Care Reviewed With patient   Patient Care Overview   Progress progress toward functional goals as expected       Goal: Adult Individualization and Mutuality  Outcome: Ongoing (interventions implemented as appropriate)  Goal: Discharge Needs Assessment  Outcome: Ongoing (interventions implemented as appropriate)    11/06/17 1201 11/07/17 1145 11/10/17 0438   Discharge Needs Assessment   Concerns To Be Addressed --  --  no discharge needs identified   Community Agency Name(S) --  No HH involved --    Equipment Needed After Discharge --  none --    Discharge Disposition --  --  still a patient   Living Environment   Transportation Available family or friend will provide;car --  --    Self-Care   Equipment Currently Used at Home --  none --          Problem: Pain, Acute (Adult)  Goal: Acceptable Pain Control/Comfort Level  Outcome: Ongoing (interventions implemented as appropriate)    11/13/17 1152   Pain, Acute (Adult)   Acceptable Pain Control/Comfort Level making progress toward outcome

## 2017-11-13 NOTE — PROGRESS NOTES
[facility] Medicine Services  PROGRESS NOTE    Patient Name: Gloria Smith  : 1975  MRN: [id number]    Date of Admission: 2017  Length of Stay: 7  Primary Care Physician: Shaun Taylor MD    Subjective   Subjective   CC: F/U Crohns    HPI: Patient sitting up in bed in NAD.  No adverse events overnight.  Patient states that she's having RUQ abdominal tenderness and wants to see Dr. Mcduffie.  1 to stay 1 more night to make sure she can tolerate advancing her diet, since she is only had 1 meal plus she wants to make sure her abdomen is OK.  Patient has had a BM, but states it's small hard stool.    Review of Systems  Gen- No fevers, chills  CV- No chest pain, palpitations  Resp- No cough, dyspnea  GI-  RUQ abdominal pain, diffuse mild tenderness, +BM  Otherwise ROS is negative except as mentioned in the HPI.    Objective   Objective   Vital Signs:   Temp:  [97.3 °F (36.3 °C)-98.3 °F (36.8 °C)] 97.3 °F (36.3 °C)  Heart Rate:  [111] 111  Resp:  [16-18] 18  BP: (127-140)/() 127/83  Physical Exam:  General: Alert, well-developed well-nourished female in no acute distress    Head: Normocephalic atraumatic    Eyes: PERRLA, EOMI, nonicteric, conjunctiva normal    ENT: Pink, moist mucous membranes    Neck: Supple, nontender, trachea midline without lymphadenopathy, JVD, nuchal rigidity.      Cardiovascular: RRR  no M/R/G  +1 DP pulses bilaterally    Respiratory: Nonlabored, symmetrical chest expansion, clear to auscultation bilaterally    Abdomen: Obese, soft, diffuse tenderness, distended, hypoactive bowel sounds in all 4 quadrants, midline incision healing well without erythema or drainage; staples still in place    Extremities: FROM in upper and lower extremities bilaterally. negative for edema/cyanosis..  Negative calf pain    Skin: Pink/warm/dry.  No rash or lesions noted    Neuro: Alert and oriented to person place time and situation, speech is clear, follows all  commands, recent and remote memory intact    Psych: Patient is pleasant and cooperative.  Normal affect.  Negative suicidal ideation or homicidal ideation.    Results Reviewed:  I have personally reviewed current lab, radiology, and data and agree.    Results from last 7 days  Lab Units 11/12/17  0520 11/11/17  0536 11/10/17  0438   WBC 10*3/mm3 4.59 4.29 4.32   HEMOGLOBIN g/dL 8.6* 8.2* 7.8*   HEMATOCRIT % 26.5* 25.7* 23.4*   PLATELETS 10*3/mm3 215 184 164       Results from last 7 days  Lab Units 11/11/17  0536 11/10/17  0438 11/08/17  0335   SODIUM mmol/L 138 138 138   POTASSIUM mmol/L 3.9 4.0 4.5   CHLORIDE mmol/L 98* 103 107   CO2 mmol/L 31.0 30.0 29.0   BUN mg/dL 5* 5* 6*   CREATININE mg/dL 0.40* 0.50* 0.50*   GLUCOSE mg/dL 84 85 92   CALCIUM mg/dL 8.2* 8.3* 8.7   ALT (SGPT) U/L 28  --   --    AST (SGOT) U/L 29  --   --      Microbiology Results Abnormal     None        Imaging Results (last 24 hours)     ** No results found for the last 24 hours. **        I have reviewed the medications.    Assessment/Plan   Assessment / Plan     Hospital Problem List     Crohn disease    Hypertension    Anemia, popst-op    Nausea    Ileus, postoperative        Brief Hospital Course to date:  Gloria Smith is a 42 y.o. female s/p ileal-cecal resection for Crohns.     Assessment & Plan:    Crohn's Disease  -- s/p ileocecotomy 11.6.17  -- previously on Remicaide as outpatient    Postoperative ileus  -- Improved, now without NGT and having BM   -- Tolerating advanced diet x 1 meal, but wants to stay one more day to make sure she is OK  -- C/O RUQ abd pain that is new    Postop anemia  -- s/p 1 unit PRBC  -- iron nreplacement - ferrlecit [id number]  -- oral iron after dc  -- H/H stable     HTN   -- continue BB    DVT Prophylaxis:  SQ heparin    CODE STATUS: Full Code    Disposition: I expect the patient to be discharged home probably tomorrow if tolerating a regular diet; OK with CRS, but patient c/o RUQ abd pain.      Melissa  RASHAD Baker  11/13/17  10:17 AM

## 2017-11-13 NOTE — PROGRESS NOTES
[facility] Medicine Services  PROGRESS NOTE    Patient Name: Gloria Smith  : 1975  MRN: [id number]    Date of Admission: 2017  Length of Stay: 6  Primary Care Physician: Shaun Taylor MD    Subjective   Subjective     CC: F/U Crohns    HPI: Patient asked nursing to d/c NG tube overnight.  She has been having bowel movements and passing gas.  Tolerating broth.      Review of Systems  Gen- No fevers, chills  CV- No chest pain, palpitations  Resp- No cough, dyspnea  GI-  No abdominal pain, feels a little more gas this morning since she is not moving around yet     Otherwise ROS is negative except as mentioned in the HPI.    Objective   Objective     Vital Signs:   Temp:  [98 °F (36.7 °C)-98.4 °F (36.9 °C)] 98 °F (36.7 °C)  Heart Rate:  [] 98  Resp:  [16-18] 16  BP: (136-139)/(95-97) 136/95        Physical Exam:  Constitutional: No acute distress, sleeping but easily awoken  HENT: NCAT, mucous membranes moist  Respiratory: Clear to auscultation bilaterally, respiratory effort normal   Cardiovascular: RRR, no murmurs, rubs, or gallops  Gastrointestinal: Bowel sounds present but hypoactive, soft, nondistended, nontender, midline surgical dressing c/d/i  Musculoskeletal: No bilateral ankle edema  Psychiatric: Appropriate affect, cooperative  Neurologic: Oriented x 3, Cranial Nerves grossly intact to confrontation, speech clear  Skin: No rashes      Results Reviewed:  I have personally reviewed current lab, radiology, and data and agree.      Results from last 7 days  Lab Units 11/12/17  0520 11/11/17  0536 11/10/17  0438   WBC 10*3/mm3 4.59 4.29 4.32   HEMOGLOBIN g/dL 8.6* 8.2* 7.8*   HEMATOCRIT % 26.5* 25.7* 23.4*   PLATELETS 10*3/mm3 215 184 164       Results from last 7 days  Lab Units 1736 11/10/17  0438 17  0335   SODIUM mmol/L 138 138 138   POTASSIUM mmol/L 3.9 4.0 4.5   CHLORIDE mmol/L 98* 103 107   CO2 mmol/L 31.0 30.0 29.0   BUN mg/dL 5* 5* 6*    CREATININE mg/dL 0.40* 0.50* 0.50*   GLUCOSE mg/dL 84 85 92   CALCIUM mg/dL 8.2* 8.3* 8.7   ALT (SGPT) U/L 28  --   --    AST (SGOT) U/L 29  --   --        Microbiology Results Abnormal     None          Imaging Results (last 24 hours)     ** No results found for the last 24 hours. **             I have reviewed the medications.    Assessment/Plan   Assessment / Plan     Hospital Problem List     Crohn disease    Hypertension    Anemia, popst-op    Nausea    Ileus, postoperative             Brief Hospital Course to date:  Gloria Smith is a 42 y.o. female s/p ileal-cecal resection for Crohns.       Assessment & Plan:    Crohn's Disease  -- s/p ileocecotomy 11.6.17  -- previously on Remicaide as outpatient    Postoperative ileus  -- Improved, now without NGT and having BM     Postop anemia  -- s/p 1 unit PRBC  -- iron nreplacement - ferrlecit [id number]  -- oral iron after dc  -- H/H stable     HTN   -- continue BB    DVT Prophylaxis:  SQ heparin    CODE STATUS: Full Code    Disposition: I expect the patient to be discharged home probably tomorrow if tolerating a regular diet and OK with CRS.      Lyudmila Terrell MD  11/12/17  7:17 PM

## 2017-11-13 NOTE — PLAN OF CARE
Problem: Patient Care Overview (Adult)  Goal: Plan of Care Review  Outcome: Ongoing (interventions implemented as appropriate)    11/13/17 0343   Coping/Psychosocial Response Interventions   Plan Of Care Reviewed With patient   Patient Care Overview   Progress progress toward functional goals as expected         Problem: Pain, Acute (Adult)  Goal: Acceptable Pain Control/Comfort Level  Outcome: Ongoing (interventions implemented as appropriate)    11/13/17 0343   Pain, Acute (Adult)   Acceptable Pain Control/Comfort Level making progress toward outcome

## 2017-11-17 ENCOUNTER — APPOINTMENT (OUTPATIENT)
Dept: ONCOLOGY | Facility: HOSPITAL | Age: 42
End: 2017-11-17

## 2017-12-01 ENCOUNTER — APPOINTMENT (OUTPATIENT)
Dept: ONCOLOGY | Facility: HOSPITAL | Age: 42
End: 2017-12-01

## 2017-12-08 ENCOUNTER — INFUSION (OUTPATIENT)
Dept: ONCOLOGY | Facility: HOSPITAL | Age: 42
End: 2017-12-08

## 2017-12-08 VITALS
TEMPERATURE: 97.1 F | BODY MASS INDEX: 33.13 KG/M2 | WEIGHT: 193 LBS | SYSTOLIC BLOOD PRESSURE: 136 MMHG | HEART RATE: 103 BPM | DIASTOLIC BLOOD PRESSURE: 90 MMHG | RESPIRATION RATE: 18 BRPM

## 2017-12-08 DIAGNOSIS — K50.90 CROHN'S DISEASE WITHOUT COMPLICATION, UNSPECIFIED GASTROINTESTINAL TRACT LOCATION (HCC): Primary | ICD-10-CM

## 2017-12-08 PROCEDURE — 96413 CHEMO IV INFUSION 1 HR: CPT

## 2017-12-08 PROCEDURE — 25010000002 INFLIXIMAB PER 10 MG: Performed by: COLON & RECTAL SURGERY

## 2017-12-08 PROCEDURE — 96415 CHEMO IV INFUSION ADDL HR: CPT

## 2017-12-08 RX ORDER — ACETAMINOPHEN 500 MG
1000 TABLET ORAL ONCE
Status: COMPLETED | OUTPATIENT
Start: 2017-12-08 | End: 2017-12-08

## 2017-12-08 RX ORDER — CETIRIZINE HYDROCHLORIDE 10 MG/1
10 TABLET ORAL ONCE
Status: COMPLETED | OUTPATIENT
Start: 2017-12-08 | End: 2017-12-08

## 2017-12-08 RX ORDER — CETIRIZINE HYDROCHLORIDE 10 MG/1
10 TABLET ORAL ONCE
Status: CANCELLED
Start: 2017-12-08 | End: 2017-12-08

## 2017-12-08 RX ORDER — ACETAMINOPHEN 500 MG
1000 TABLET ORAL ONCE
Status: CANCELLED | OUTPATIENT
Start: 2017-12-08

## 2017-12-08 RX ADMIN — CETIRIZINE HYDROCHLORIDE 10 MG: 10 TABLET, FILM COATED ORAL at 10:16

## 2017-12-08 RX ADMIN — INFLIXIMAB 500 MG: 100 INJECTION, POWDER, LYOPHILIZED, FOR SOLUTION INTRAVENOUS at 10:20

## 2017-12-08 RX ADMIN — ACETAMINOPHEN 1000 MG: 500 TABLET ORAL at 10:16

## 2018-01-31 RX ORDER — ACETAMINOPHEN 500 MG
1000 TABLET ORAL ONCE
Status: CANCELLED | OUTPATIENT
Start: 2018-01-31

## 2018-01-31 RX ORDER — CETIRIZINE HYDROCHLORIDE 10 MG/1
10 TABLET ORAL ONCE
Status: CANCELLED
Start: 2018-01-31 | End: 2018-01-31

## 2018-02-02 ENCOUNTER — INFUSION (OUTPATIENT)
Dept: ONCOLOGY | Facility: HOSPITAL | Age: 43
End: 2018-02-02

## 2018-02-02 VITALS
BODY MASS INDEX: 35.36 KG/M2 | DIASTOLIC BLOOD PRESSURE: 86 MMHG | SYSTOLIC BLOOD PRESSURE: 138 MMHG | TEMPERATURE: 98.3 F | HEART RATE: 76 BPM | WEIGHT: 206 LBS | RESPIRATION RATE: 18 BRPM

## 2018-02-02 DIAGNOSIS — K50.90 CROHN'S DISEASE WITHOUT COMPLICATION, UNSPECIFIED GASTROINTESTINAL TRACT LOCATION (HCC): Primary | ICD-10-CM

## 2018-02-02 PROCEDURE — 96415 CHEMO IV INFUSION ADDL HR: CPT

## 2018-02-02 PROCEDURE — 25010000002 INFLIXIMAB PER 10 MG: Performed by: COLON & RECTAL SURGERY

## 2018-02-02 PROCEDURE — 96413 CHEMO IV INFUSION 1 HR: CPT

## 2018-02-02 RX ORDER — ACETAMINOPHEN 500 MG
1000 TABLET ORAL ONCE
OUTPATIENT
Start: 2018-02-02

## 2018-02-02 RX ORDER — CETIRIZINE HYDROCHLORIDE 10 MG/1
10 TABLET ORAL ONCE
Status: COMPLETED | OUTPATIENT
Start: 2018-02-02 | End: 2018-02-02

## 2018-02-02 RX ORDER — ACETAMINOPHEN 500 MG
1000 TABLET ORAL ONCE
Status: COMPLETED | OUTPATIENT
Start: 2018-02-02 | End: 2018-02-02

## 2018-02-02 RX ORDER — CETIRIZINE HYDROCHLORIDE 10 MG/1
10 TABLET ORAL ONCE
Start: 2018-02-02 | End: 2018-02-02

## 2018-02-02 RX ADMIN — ACETAMINOPHEN 1000 MG: 500 TABLET, FILM COATED ORAL at 10:13

## 2018-02-02 RX ADMIN — INFLIXIMAB 500 MG: 100 INJECTION, POWDER, LYOPHILIZED, FOR SOLUTION INTRAVENOUS at 10:15

## 2018-02-02 RX ADMIN — CETIRIZINE HYDROCHLORIDE 10 MG: 10 TABLET, FILM COATED ORAL at 10:13

## 2018-03-30 ENCOUNTER — APPOINTMENT (OUTPATIENT)
Dept: ONCOLOGY | Facility: HOSPITAL | Age: 43
End: 2018-03-30

## 2018-08-27 PROCEDURE — 87116 MYCOBACTERIA CULTURE: CPT

## 2018-08-27 PROCEDURE — 87206 SMEAR FLUORESCENT/ACID STAI: CPT

## 2018-08-28 PROCEDURE — 87206 SMEAR FLUORESCENT/ACID STAI: CPT

## 2018-08-28 PROCEDURE — 87116 MYCOBACTERIA CULTURE: CPT

## 2018-08-29 ENCOUNTER — TRANSCRIBE ORDERS (OUTPATIENT)
Dept: LAB | Facility: HOSPITAL | Age: 43
End: 2018-08-29

## 2018-08-29 ENCOUNTER — LAB (OUTPATIENT)
Dept: LAB | Facility: HOSPITAL | Age: 43
End: 2018-08-29

## 2018-08-29 DIAGNOSIS — R76.12 NONSPECIFIC REACTION TO CELL MEDIATED IMMUNITY MEASUREMENT OF GAMMA INTERFERON ANTIGEN RESPONSE WITHOUT ACTIVE TUBERCULOSIS: ICD-10-CM

## 2018-08-29 DIAGNOSIS — R76.12 NONSPECIFIC REACTION TO CELL MEDIATED IMMUNITY MEASUREMENT OF GAMMA INTERFERON ANTIGEN RESPONSE WITHOUT ACTIVE TUBERCULOSIS: Primary | ICD-10-CM

## 2018-08-29 PROCEDURE — 87116 MYCOBACTERIA CULTURE: CPT

## 2018-08-29 PROCEDURE — 87206 SMEAR FLUORESCENT/ACID STAI: CPT

## 2018-08-30 ENCOUNTER — OFFICE VISIT (OUTPATIENT)
Dept: PULMONOLOGY | Facility: CLINIC | Age: 43
End: 2018-08-30

## 2018-08-30 VITALS
TEMPERATURE: 97.3 F | HEIGHT: 64 IN | OXYGEN SATURATION: 98 % | RESPIRATION RATE: 16 BRPM | HEART RATE: 93 BPM | WEIGHT: 198 LBS | BODY MASS INDEX: 33.8 KG/M2 | DIASTOLIC BLOOD PRESSURE: 98 MMHG | SYSTOLIC BLOOD PRESSURE: 148 MMHG

## 2018-08-30 DIAGNOSIS — R91.8 PULMONARY INFILTRATES: ICD-10-CM

## 2018-08-30 DIAGNOSIS — R76.12 POSITIVE QUANTIFERON-TB GOLD TEST: Primary | ICD-10-CM

## 2018-08-30 PROCEDURE — 99204 OFFICE O/P NEW MOD 45 MIN: CPT | Performed by: INTERNAL MEDICINE

## 2018-08-30 RX ORDER — LORAZEPAM 1 MG/1
1 TABLET ORAL AS NEEDED
COMMUNITY
Start: 2018-07-26 | End: 2018-09-24

## 2018-08-30 RX ORDER — PREDNISONE 1 MG/1
5 TABLET ORAL DAILY
COMMUNITY
Start: 2018-08-08

## 2018-08-30 RX ORDER — ISONIAZID 300 MG/1
1 TABLET ORAL DAILY
COMMUNITY
Start: 2018-08-28 | End: 2018-09-24

## 2018-08-30 RX ORDER — RIFAMPIN 300 MG/1
2 CAPSULE ORAL DAILY
COMMUNITY
Start: 2018-08-27 | End: 2018-09-24

## 2018-08-30 RX ORDER — ESTRADIOL 0.03 MG/D
FILM, EXTENDED RELEASE TRANSDERMAL
COMMUNITY

## 2018-08-30 RX ORDER — PYRAZINAMIDE TABLET 500 MG/1
4 TABLET ORAL DAILY
COMMUNITY
Start: 2018-08-28 | End: 2018-09-24

## 2018-08-30 RX ORDER — LANOLIN ALCOHOL/MO/W.PET/CERES
50 CREAM (GRAM) TOPICAL DAILY
COMMUNITY
End: 2018-09-24

## 2018-08-30 NOTE — PROGRESS NOTES
Gloria Smith is a 42 y.o. female here for evaluation of   Chief Complaint   Patient presents with   • Lung Nodule       Problem list:  1. Positive QuantiFERON-TB Gold test  2. Bilateral nodular infiltrates  3. Crohn's disease, Remicade  4. Hypertension  5. Diarrhea  6. GERD  7. BRCA gene positive  8. Open reduction internal fixation left ankle fracture with subsequent hardware removal  9. Appendectomy, ileocecolectomy, bilateral oophorectomy, November 2017  10. Exploratory laparotomy, 1997  11. Colonoscopy, May 2017  12. Cholecystectomy  13. Bilateral breast reduction  14. 0.25 pack per day for 27 years, quit July 2018  15. No known drug allergies    History of Present Illness    42-year-old woman with Crohn's disease and associated enteropathic arthritis nightly taking Remicade. His incisions were considering changing her to a different medication and obtained a quantiferon gold TB test which was positive. She had a negative PPD in June 2017 as well as in 2014. A chest x-ray was obtained and was abnormal and she had a subsequent CT scan that revealed multiple pulmonary nodular opacities and consolidation. She was evaluated by infectious disease and she dropped off 3 sputum test for AFB yesterday morning. She does not have fever chills night sweats or weight loss. She has a mild cough sometimes productive in the mornings. She denies hemoptysis or chest pain. He has no known exposure to tuberculosis. She works as a dental hygienist. She has traveled to the [place], [place], [place].    Review of Systems   Constitutional: Positive for fatigue.   HENT: Positive for dental problem.    Eyes: Negative for visual disturbance.   Respiratory: Negative for cough and shortness of breath.    Gastrointestinal: Positive for diarrhea.   Genitourinary: Negative.    Musculoskeletal: Positive for arthralgias.   Skin: Positive for rash.        Rash around estrogen patch   Allergic/Immunologic: Positive for environmental  allergies and immunocompromised state.         Current Outpatient Prescriptions:   •  escitalopram (LEXAPRO) 10 MG tablet, Take 10 mg by mouth Daily., Disp: , Rfl:   •  estradiol (VIVELLE-DOT) 0.025 MG/24HR patch, estradiol 0.025 mg/24 hr semiweekly transdermal patch  Apply 1 patch twice a week by transdermal route., Disp: , Rfl:   •  ibuprofen (ADVIL,MOTRIN) 400 MG tablet, Take 400 mg by mouth Every 6 (Six) Hours As Needed for Mild Pain ., Disp: , Rfl:   •  InFLIXimab (REMICADE IV), Infuse 1 Bag into a venous catheter Take As Directed. TWICE MONTHLY, Disp: , Rfl:   •  isoniazid (NYDRAZID) 300 MG tablet, 1 tablet Daily., Disp: , Rfl:   •  LORazepam (ATIVAN) 1 MG tablet, As Needed., Disp: , Rfl:   •  metoprolol succinate XL (TOPROL-XL) 25 MG 24 hr tablet, Take 25 mg by mouth Daily., Disp: , Rfl:   •  predniSONE (DELTASONE) 5 MG tablet, Daily., Disp: , Rfl:   •  progesterone (PROMETRIUM) 100 MG capsule, Daily., Disp: , Rfl:   •  pyrazinamide 500 MG tablet, 4 tablets Daily., Disp: , Rfl:   •  rifAMPin (RIFADIN) 300 MG capsule, 2 capsules Daily., Disp: , Rfl:   •  vitamin B-6 (PYRIDOXINE) 50 MG tablet, Take 50 mg by mouth Daily., Disp: , Rfl:     Past Medical History:   Diagnosis Date   • Acid reflux    • Anxiety    • Bowel trouble    • BRCA gene positive    • Constipation    • Crohn's disease (CMS/HCC)    • Diarrhea    • Hypertension    • TB (tuberculosis) 08/2018   • Wears glasses S     Past Surgical History:   Procedure Laterality Date   • ANKLE SURGERY Left     orif, fracture, later removal hardware   • APPENDECTOMY     • BILATERAL BREAST REDUCTION     • CHOLECYSTECTOMY     • COLON RESECTION Bilateral 11/6/2017    Procedure: ILEOCECOTOMY, BILATERAL SALPINGO OOPHERECTOMY;  Surgeon: Ajith Mcduffie MD;  Location: [facility];  Service:    • COLONOSCOPY      5-2017   • EXPLORATORY LAPAROTOMY  1997   • OOPHORECTOMY       Social History     Social History   • Marital status:    • Number of children: 0  "    Occupational History   • dental hygenist      Social History Main Topics   • Smoking status: Former Smoker     Packs/day: 0.25     Years: 27.00     Types: Cigarettes     Quit date: 7/1/2018   • Smokeless tobacco: Never Used   • Alcohol use No   • Drug use: No   • Sexual activity: Defer     Other Topics Concern   • Not on file     Family History   Problem Relation Age of Onset   • Breast cancer Mother    • Hypertension Father    • Breast cancer Maternal Grandmother    • Ovarian cancer Maternal Grandmother      Blood pressure 148/98, pulse 93, temperature 97.3 °F (36.3 °C), resp. rate 16, height 162.6 cm (64\"), weight 89.8 kg (198 lb), SpO2 98 %.    Physical Exam   Constitutional: She is oriented to person, place, and time. She appears well-developed and well-nourished.   HENT:   Head: Normocephalic and atraumatic.   Clear PND   Eyes: Pupils are equal, round, and reactive to light. No scleral icterus.   Neck: Normal range of motion. Neck supple. No JVD present. No tracheal deviation present. No thyromegaly present.   Cardiovascular: Normal rate, regular rhythm and normal heart sounds.    No murmur heard.  Pulmonary/Chest: Effort normal and breath sounds normal. No respiratory distress. She has no wheezes.   Abdominal: Soft. Bowel sounds are normal. She exhibits no distension. There is no tenderness.   Musculoskeletal: Normal range of motion. She exhibits no edema or tenderness.   Lymphadenopathy:     She has no cervical adenopathy.   Neurological: She is alert and oriented to person, place, and time.   Skin: Skin is warm and dry.   Psychiatric: She has a normal mood and affect.         PFTs:    Radiology:  I reviewed her CT scan and she does have significant bilateral nodular infiltrates, more prominent in the upper lobes, no visible adenopathy  Lab:    Gloria was seen today for lung nodule.    Diagnoses and all orders for this visit:    Positive QuantiFERON-TB Gold test    Pulmonary infiltrates, " nodular        Discussion:   Interesting 42-year-old woman, previous smoker with recent cessation, with a known history of Crohn's disease on Remicade. Prior to initiating therapy her PPD was negative. Now she presents with a positive TB QuantiFERON Gold test and nodular infiltrates. Of course I concur with infectious disease that the primary concern initially is to exclude active tuberculous infection. There are 3 sputum's in the microbiology lab. If these are smear positive then we need no further diagnostic testing and she can initiate her therapy. If the smears are negative I will proceed with bronchoscopy and lavage next week. Further, I would also consider a navigational bronchoscopy with biopsy. This could be TB, fungal infection, sarcoid, metastatic cancer. Crohn's disease can be associated with some interstitial lung disease but there is no sign of that on her CT scan. She has no palpable thyroid nodule and is currently with her colonoscopies and mammograms.    I have tentatively set up a bronchoscopy for September 6.    Christy Herrera MD

## 2018-09-06 ENCOUNTER — TRANSCRIBE ORDERS (OUTPATIENT)
Dept: PULMONOLOGY | Facility: CLINIC | Age: 43
End: 2018-09-06

## 2018-09-06 ENCOUNTER — APPOINTMENT (OUTPATIENT)
Dept: PREADMISSION TESTING | Facility: HOSPITAL | Age: 43
End: 2018-09-06

## 2018-09-06 VITALS — BODY MASS INDEX: 34.25 KG/M2 | HEIGHT: 64 IN | WEIGHT: 200.62 LBS

## 2018-09-06 LAB
ALBUMIN SERPL-MCNC: 4.19 G/DL (ref 3.2–4.8)
ALBUMIN/GLOB SERPL: 1.4 G/DL (ref 1.5–2.5)
ALP SERPL-CCNC: 50 U/L (ref 25–100)
ALT SERPL W P-5'-P-CCNC: 78 U/L (ref 7–40)
ANION GAP SERPL CALCULATED.3IONS-SCNC: 8 MMOL/L (ref 3–11)
APTT PPP: 28 SECONDS (ref 24–31)
AST SERPL-CCNC: 58 U/L (ref 0–33)
BILIRUB SERPL-MCNC: 0.3 MG/DL (ref 0.3–1.2)
BUN BLD-MCNC: 11 MG/DL (ref 9–23)
BUN/CREAT SERPL: 21.6 (ref 7–25)
CALCIUM SPEC-SCNC: 8.9 MG/DL (ref 8.7–10.4)
CHLORIDE SERPL-SCNC: 105 MMOL/L (ref 99–109)
CO2 SERPL-SCNC: 25 MMOL/L (ref 20–31)
CREAT BLD-MCNC: 0.51 MG/DL (ref 0.6–1.3)
DEPRECATED RDW RBC AUTO: 43.7 FL (ref 37–54)
ERYTHROCYTE [DISTWIDTH] IN BLOOD BY AUTOMATED COUNT: 12.6 % (ref 11.3–14.5)
GFR SERPL CREATININE-BSD FRML MDRD: 131 ML/MIN/1.73
GLOBULIN UR ELPH-MCNC: 3 GM/DL
GLUCOSE BLD-MCNC: 100 MG/DL (ref 70–100)
HCT VFR BLD AUTO: 39.8 % (ref 34.5–44)
HGB BLD-MCNC: 13.4 G/DL (ref 11.5–15.5)
INR PPP: 0.92 (ref 0.91–1.09)
MCH RBC QN AUTO: 32.1 PG (ref 27–31)
MCHC RBC AUTO-ENTMCNC: 33.7 G/DL (ref 32–36)
MCV RBC AUTO: 95.4 FL (ref 80–99)
PLATELET # BLD AUTO: 178 10*3/MM3 (ref 150–450)
PMV BLD AUTO: 10.3 FL (ref 6–12)
POTASSIUM BLD-SCNC: 4.3 MMOL/L (ref 3.5–5.5)
PROT SERPL-MCNC: 7.2 G/DL (ref 5.7–8.2)
PROTHROMBIN TIME: 9.7 SECONDS (ref 9.6–11.5)
RBC # BLD AUTO: 4.17 10*6/MM3 (ref 3.89–5.14)
SODIUM BLD-SCNC: 138 MMOL/L (ref 132–146)
WBC NRBC COR # BLD: 3.69 10*3/MM3 (ref 3.5–10.8)

## 2018-09-06 PROCEDURE — 93010 ELECTROCARDIOGRAM REPORT: CPT | Performed by: INTERNAL MEDICINE

## 2018-09-06 PROCEDURE — 36415 COLL VENOUS BLD VENIPUNCTURE: CPT

## 2018-09-06 PROCEDURE — 85027 COMPLETE CBC AUTOMATED: CPT | Performed by: INTERNAL MEDICINE

## 2018-09-06 PROCEDURE — 80053 COMPREHEN METABOLIC PANEL: CPT | Performed by: INTERNAL MEDICINE

## 2018-09-06 PROCEDURE — 85610 PROTHROMBIN TIME: CPT | Performed by: INTERNAL MEDICINE

## 2018-09-06 PROCEDURE — 85730 THROMBOPLASTIN TIME PARTIAL: CPT | Performed by: INTERNAL MEDICINE

## 2018-09-06 PROCEDURE — 93005 ELECTROCARDIOGRAM TRACING: CPT

## 2018-09-06 RX ORDER — LORATADINE 10 MG/1
10 TABLET ORAL DAILY PRN
COMMUNITY

## 2018-09-06 NOTE — DISCHARGE INSTRUCTIONS
The following information and instructions were given:    NPO after MN except sips of water with routine prescribed medication (except blood thinner, diabetes, or weight reducing medication) unless otherwise instructed by your physician.  Do not eat, drink, smoke or chew gum after midnight the night before surgery. This also includes no mints.    DO NOT shave for two days before your procedure.  Do not wear makeup.      DO NOT wear fingernail polish (gel/regular) and/or acrylic/artificial nails on the day of surgery.   If a patient had recent manicure and would rather not remove polish or artificial nails, then the minimum requirement is that the polish/artificial nails must be removed from the middle finger on each hand.      If patient is having surgery/procedure on an upper extremity, then the patient was instructed that fingernail polish/artificial fingernails must be removed for surgery.  NO EXCEPTIONS.      If patient is having surgery/procedure on a lower extremity, then the patient was instructed that toenail polish on both extremities must be removed for surgery.  NO EXCEPTIONS.    Remove all jewelry (advised to go to jeweler if unable to remove).  Jewelry, especially rings, can no longer be taped for surgery.    Leave anything you consider valuable at home.    Leave your suitcase in the car until after your surgery.    Bring the following with you (if applicable)       -picture ID and insurance cards       -Co-pay/deductible required by insurance       -Medications in the original bottles (not a list) including all over-the-counter  medications if not brought to [facility]       -Copy of advance directive, living will or power of  documents if not  brought to [facility]       -CPAP or BIPAP mask and tubing (do not bring machine)       -Skin prep instructions sheet       -PAT Pass    Education booklet, brochure, handout or binder given to patient.    Pain Control After Surgery handout given to  patient.    Respirex use (handout given to patient) and pneumonia prevention.    Signs and Symptoms of infection discussed.    DVT Prevention education given.  Stressing the importance of ambulation.

## 2018-09-07 ENCOUNTER — ANESTHESIA (OUTPATIENT)
Dept: GASTROENTEROLOGY | Facility: HOSPITAL | Age: 43
End: 2018-09-07

## 2018-09-07 ENCOUNTER — ANESTHESIA EVENT (OUTPATIENT)
Dept: GASTROENTEROLOGY | Facility: HOSPITAL | Age: 43
End: 2018-09-07

## 2018-09-07 ENCOUNTER — HOSPITAL ENCOUNTER (OUTPATIENT)
Facility: HOSPITAL | Age: 43
Setting detail: HOSPITAL OUTPATIENT SURGERY
Discharge: HOME | End: 2018-09-07
Attending: INTERNAL MEDICINE | Admitting: INTERNAL MEDICINE

## 2018-09-07 VITALS
SYSTOLIC BLOOD PRESSURE: 132 MMHG | HEART RATE: 81 BPM | DIASTOLIC BLOOD PRESSURE: 84 MMHG | RESPIRATION RATE: 16 BRPM | OXYGEN SATURATION: 98 % | TEMPERATURE: 97.5 F

## 2018-09-07 DIAGNOSIS — R91.1 LUNG NODULE: ICD-10-CM

## 2018-09-07 DIAGNOSIS — R91.1 LUNG NODULE: Primary | ICD-10-CM

## 2018-09-07 PROCEDURE — 31624 DX BRONCHOSCOPE/LAVAGE: CPT | Performed by: INTERNAL MEDICINE

## 2018-09-07 PROCEDURE — 88112 CYTOPATH CELL ENHANCE TECH: CPT | Performed by: INTERNAL MEDICINE

## 2018-09-07 PROCEDURE — 87530 HSV DNA QUANT: CPT | Performed by: INTERNAL MEDICINE

## 2018-09-07 PROCEDURE — 87633 RESP VIRUS 12-25 TARGETS: CPT | Performed by: INTERNAL MEDICINE

## 2018-09-07 PROCEDURE — 87496 CYTOMEG DNA AMP PROBE: CPT | Performed by: INTERNAL MEDICINE

## 2018-09-07 PROCEDURE — 87205 SMEAR GRAM STAIN: CPT | Performed by: INTERNAL MEDICINE

## 2018-09-07 PROCEDURE — 87206 SMEAR FLUORESCENT/ACID STAI: CPT | Performed by: INTERNAL MEDICINE

## 2018-09-07 PROCEDURE — 87801 DETECT AGNT MULT DNA AMPLI: CPT | Performed by: INTERNAL MEDICINE

## 2018-09-07 PROCEDURE — 87070 CULTURE OTHR SPECIMN AEROBIC: CPT | Performed by: INTERNAL MEDICINE

## 2018-09-07 PROCEDURE — 25010000002 PROPOFOL 1000 MG/ML EMULSION: Performed by: NURSE ANESTHETIST, CERTIFIED REGISTERED

## 2018-09-07 PROCEDURE — 87541 LEGION PNEUMO DNA AMP PROB: CPT | Performed by: INTERNAL MEDICINE

## 2018-09-07 PROCEDURE — 87305 ASPERGILLUS AG IA: CPT | Performed by: INTERNAL MEDICINE

## 2018-09-07 PROCEDURE — 87798 DETECT AGENT NOS DNA AMP: CPT | Performed by: INTERNAL MEDICINE

## 2018-09-07 PROCEDURE — 87116 MYCOBACTERIA CULTURE: CPT | Performed by: INTERNAL MEDICINE

## 2018-09-07 PROCEDURE — 25010000002 MIDAZOLAM PER 1 MG: Performed by: ANESTHESIOLOGY

## 2018-09-07 PROCEDURE — 25010000002 PROPOFOL 10 MG/ML EMULSION: Performed by: NURSE ANESTHETIST, CERTIFIED REGISTERED

## 2018-09-07 PROCEDURE — 25010000002 KETOROLAC TROMETHAMINE PER 15 MG: Performed by: NURSE ANESTHETIST, CERTIFIED REGISTERED

## 2018-09-07 PROCEDURE — 87529 HSV DNA AMP PROBE: CPT | Performed by: INTERNAL MEDICINE

## 2018-09-07 PROCEDURE — 87102 FUNGUS ISOLATION CULTURE: CPT | Performed by: INTERNAL MEDICINE

## 2018-09-07 RX ORDER — IPRATROPIUM BROMIDE AND ALBUTEROL SULFATE 2.5; .5 MG/3ML; MG/3ML
3 SOLUTION RESPIRATORY (INHALATION) ONCE AS NEEDED
Status: DISCONTINUED | OUTPATIENT
Start: 2018-09-07 | End: 2018-09-07 | Stop reason: HOSPADM

## 2018-09-07 RX ORDER — ONDANSETRON 2 MG/ML
4 INJECTION INTRAMUSCULAR; INTRAVENOUS ONCE AS NEEDED
Status: DISCONTINUED | OUTPATIENT
Start: 2018-09-07 | End: 2018-09-07 | Stop reason: HOSPADM

## 2018-09-07 RX ORDER — PROMETHAZINE HYDROCHLORIDE 25 MG/ML
6.25 INJECTION, SOLUTION INTRAMUSCULAR; INTRAVENOUS ONCE AS NEEDED
Status: DISCONTINUED | OUTPATIENT
Start: 2018-09-07 | End: 2018-09-07 | Stop reason: HOSPADM

## 2018-09-07 RX ORDER — MIDAZOLAM HYDROCHLORIDE 5 MG/ML
2 INJECTION INTRAMUSCULAR; INTRAVENOUS ONCE
Status: DISCONTINUED | OUTPATIENT
Start: 2018-09-07 | End: 2018-09-07 | Stop reason: HOSPADM

## 2018-09-07 RX ORDER — PROMETHAZINE HYDROCHLORIDE 25 MG/1
25 SUPPOSITORY RECTAL ONCE AS NEEDED
Status: DISCONTINUED | OUTPATIENT
Start: 2018-09-07 | End: 2018-09-07 | Stop reason: HOSPADM

## 2018-09-07 RX ORDER — HYDRALAZINE HYDROCHLORIDE 20 MG/ML
5 INJECTION INTRAMUSCULAR; INTRAVENOUS
Status: DISCONTINUED | OUTPATIENT
Start: 2018-09-07 | End: 2018-09-07 | Stop reason: HOSPADM

## 2018-09-07 RX ORDER — SODIUM CHLORIDE 0.9 % (FLUSH) 0.9 %
1-10 SYRINGE (ML) INJECTION AS NEEDED
Status: DISCONTINUED | OUTPATIENT
Start: 2018-09-07 | End: 2018-09-07 | Stop reason: HOSPADM

## 2018-09-07 RX ORDER — FAMOTIDINE 20 MG/1
20 TABLET, FILM COATED ORAL ONCE
Status: DISCONTINUED | OUTPATIENT
Start: 2018-09-07 | End: 2018-09-07 | Stop reason: HOSPADM

## 2018-09-07 RX ORDER — SODIUM CHLORIDE, SODIUM LACTATE, POTASSIUM CHLORIDE, CALCIUM CHLORIDE 600; 310; 30; 20 MG/100ML; MG/100ML; MG/100ML; MG/100ML
9 INJECTION, SOLUTION INTRAVENOUS CONTINUOUS
Status: DISCONTINUED | OUTPATIENT
Start: 2018-09-07 | End: 2018-09-08 | Stop reason: HOSPADM

## 2018-09-07 RX ORDER — KETOROLAC TROMETHAMINE 30 MG/ML
INJECTION, SOLUTION INTRAMUSCULAR; INTRAVENOUS AS NEEDED
Status: DISCONTINUED | OUTPATIENT
Start: 2018-09-07 | End: 2018-09-07 | Stop reason: SURG

## 2018-09-07 RX ORDER — LABETALOL HYDROCHLORIDE 5 MG/ML
5 INJECTION, SOLUTION INTRAVENOUS
Status: DISCONTINUED | OUTPATIENT
Start: 2018-09-07 | End: 2018-09-07 | Stop reason: HOSPADM

## 2018-09-07 RX ORDER — MIDAZOLAM HYDROCHLORIDE 1 MG/ML
2 INJECTION INTRAMUSCULAR; INTRAVENOUS ONCE
Status: COMPLETED | OUTPATIENT
Start: 2018-09-07 | End: 2018-09-07

## 2018-09-07 RX ORDER — LIDOCAINE HYDROCHLORIDE 10 MG/ML
0.5 INJECTION, SOLUTION EPIDURAL; INFILTRATION; INTRACAUDAL; PERINEURAL ONCE AS NEEDED
Status: DISCONTINUED | OUTPATIENT
Start: 2018-09-07 | End: 2018-09-07 | Stop reason: HOSPADM

## 2018-09-07 RX ORDER — PROMETHAZINE HYDROCHLORIDE 25 MG/1
25 TABLET ORAL ONCE AS NEEDED
Status: DISCONTINUED | OUTPATIENT
Start: 2018-09-07 | End: 2018-09-07 | Stop reason: HOSPADM

## 2018-09-07 RX ORDER — LIDOCAINE HYDROCHLORIDE 40 MG/ML
SOLUTION TOPICAL ONCE
Status: COMPLETED | OUTPATIENT
Start: 2018-09-07 | End: 2018-09-07

## 2018-09-07 RX ORDER — PROPOFOL 10 MG/ML
VIAL (ML) INTRAVENOUS AS NEEDED
Status: DISCONTINUED | OUTPATIENT
Start: 2018-09-07 | End: 2018-09-07 | Stop reason: SURG

## 2018-09-07 RX ORDER — FAMOTIDINE 10 MG/ML
20 INJECTION, SOLUTION INTRAVENOUS ONCE
Status: COMPLETED | OUTPATIENT
Start: 2018-09-07 | End: 2018-09-07

## 2018-09-07 RX ADMIN — SODIUM CHLORIDE, POTASSIUM CHLORIDE, SODIUM LACTATE AND CALCIUM CHLORIDE 9 ML/HR: 600; 310; 30; 20 INJECTION, SOLUTION INTRAVENOUS at 08:45

## 2018-09-07 RX ADMIN — PROPOFOL 175 MCG/KG/MIN: 10 INJECTION, EMULSION INTRAVENOUS at 09:29

## 2018-09-07 RX ADMIN — LIDOCAINE HYDROCHLORIDE 4 ML: 40 SOLUTION TOPICAL at 09:10

## 2018-09-07 RX ADMIN — KETOROLAC TROMETHAMINE 30 MG: 30 INJECTION, SOLUTION INTRAMUSCULAR at 09:46

## 2018-09-07 RX ADMIN — PROPOFOL 50 MG: 10 INJECTION, EMULSION INTRAVENOUS at 09:30

## 2018-09-07 RX ADMIN — MIDAZOLAM HYDROCHLORIDE 2 MG: 1 INJECTION, SOLUTION INTRAMUSCULAR; INTRAVENOUS at 08:30

## 2018-09-07 RX ADMIN — FAMOTIDINE 20 MG: 10 INJECTION, SOLUTION INTRAVENOUS at 08:45

## 2018-09-07 NOTE — ANESTHESIA POSTPROCEDURE EVALUATION
Patient: Gloria Smith    Procedure Summary     Date:  09/07/18 Room / Location:   [facility]    Anesthesia Start:  0927 Anesthesia Stop:  1010    Procedure:  BRONCHOSCOPY (N/A Bronchus) Diagnosis:      Surgeon:  Christy Herrera MD Provider:  Ramsey Michael MD    Anesthesia Type:  general ASA Status:  2          Anesthesia Type: general  Last vitals  BP   127/82 (09/07/18 1007)   Temp   97.5 °F (36.4 °C) (09/07/18 1007)   Pulse   88 (09/07/18 1007)   Resp   16 (09/07/18 1007)     SpO2   98 % (09/07/18 1007)     Post Anesthesia Care and Evaluation    Patient location during evaluation: PACU  Patient participation: complete - patient participated  Level of consciousness: awake and alert  Pain score: 0  Pain management: adequate  Airway patency: patent  Anesthetic complications: No anesthetic complications  PONV Status: none  Cardiovascular status: hemodynamically stable and acceptable  Respiratory status: nonlabored ventilation, acceptable and nasal cannula  Hydration status: acceptable

## 2018-09-07 NOTE — INTERVAL H&P NOTE
H&P reviewed. The patient was examined and there are no changes to the H&P.      Sputum AFB smears neg

## 2018-09-07 NOTE — ANESTHESIA PREPROCEDURE EVALUATION
Anesthesia Evaluation     Patient summary reviewed and Nursing notes reviewed   no history of anesthetic complications:  NPO Solid Status: > 8 hours  NPO Liquid Status: > 8 hours           Airway   Mallampati: II  TM distance: >3 FB  Neck ROM: full  No difficulty expected  Dental      Pulmonary - negative pulmonary ROS and normal exam   Cardiovascular - normal exam    (+) hypertension,       Neuro/Psych  (+) psychiatric history,     GI/Hepatic/Renal/Endo    (+)  GERD,      Musculoskeletal     Abdominal    Substance History      OB/GYN          Other   (+) arthritis                     Anesthesia Plan    ASA 2     general     intravenous induction   Anesthetic plan, all risks, benefits, and alternatives have been provided, discussed and informed consent has been obtained with: patient.    Plan discussed with CRNA.

## 2018-09-07 NOTE — OP NOTE
BRONCHOSCOPY BAL/Javier    CONSENT: written from patient    ANESTHESIA: MAC    TIME OUT: performed immediately before procedure, patient ID verbally and confirmed with arm band, procedure identified and NKDA    Procedure explained and informed consent obtained. Patient received 4% lidocaine nebulized. She was taken back to the endoscopy suite where she was sedated by anesthesia. Viscous lidocaine placed into her left nostril followed by the bronchoscope.    Vocal cords without lesions and with normal motion. 1% lidocaine, 10 ML's used to anesthetize the larynx and vocal cords. Vocal cords intubated atraumatically. Trachea normal ron sharp. Inspection of the left bronchial tree left upper lobe lingula sparing basis and was left lower lobe were all widely patent. She had a few foamy secretions but no endobronchial lesions or purulent secretions. Bronchoscope was withdrawn to the ron and the right bronchial tree inspected. Right upper lobe had only 2 subsegments, right middle lobe and its 2 subsegments. Basilar segments of the right lower lobe were all widely patent. Bronchoscope was wedged into the posterior basilar segment of the right lower lobe and BAL obtained. 60 ML's instilled and 15 ML's returned and sent for microbiology. Additional 60 ML's instilled and 20 ML's returned and sent for cytology. Bronchoscope was then wedged into the apical posterior segment of the right upper lobe and BAL attempted with 60 ML's. However only 5 ML's returned. Additional 40 ML's instilled and 15 ML's returned. The specimens were combined and split and sent for microbiology and cytology.    Respiratory PCR panel  Aspergillus galactomannin antigen  AFB, fungal, routine cultures and stains  Cytology for GMS, viral inclusions

## 2018-09-07 NOTE — H&P (VIEW-ONLY)
Gloria Smith is a 42 y.o. female here for evaluation of   Chief Complaint   Patient presents with   • Lung Nodule       Problem list:  1. Positive QuantiFERON-TB Gold test  2. Bilateral nodular infiltrates  3. Crohn's disease, Remicade  4. Hypertension  5. Diarrhea  6. GERD  7. BRCA gene positive  8. Open reduction internal fixation left ankle fracture with subsequent hardware removal  9. Appendectomy, ileocecolectomy, bilateral oophorectomy, November 2017  10. Exploratory laparotomy, 1997  11. Colonoscopy, May 2017  12. Cholecystectomy  13. Bilateral breast reduction  14. 0.25 pack per day for 27 years, quit July 2018  15. No known drug allergies    History of Present Illness    42-year-old woman with Crohn's disease and associated enteropathic arthritis nightly taking Remicade. His incisions were considering changing her to a different medication and obtained a quantitative fair on TB test which was positive. She had a negative PPD in June 2017 as well as in 2014. A chest x-ray was obtained and was abnormal and she had a subsequent CT scan that revealed multiple pulmonary nodular opacities and consolidation. She was evaluated by infectious disease and she dropped off 3 sputum test for AFB yesterday morning. She does not have fever chills night sweats or weight loss. She has a mild cough sometimes productive in the mornings. She denies hemoptysis or chest pain. He has no known exposure to tuberculosis. She works as a dental hygienist. She has traveled to the [place], [place], [place].    Review of Systems   Constitutional: Positive for fatigue.   HENT: Positive for dental problem.    Eyes: Negative for visual disturbance.   Respiratory: Negative for cough and shortness of breath.    Gastrointestinal: Positive for diarrhea.   Genitourinary: Negative.    Musculoskeletal: Positive for arthralgias.   Skin: Positive for rash.        Rash around estrogen patch   Allergic/Immunologic: Positive for  environmental allergies and immunocompromised state.         Current Outpatient Prescriptions:   •  escitalopram (LEXAPRO) 10 MG tablet, Take 10 mg by mouth Daily., Disp: , Rfl:   •  estradiol (VIVELLE-DOT) 0.025 MG/24HR patch, estradiol 0.025 mg/24 hr semiweekly transdermal patch  Apply 1 patch twice a week by transdermal route., Disp: , Rfl:   •  ibuprofen (ADVIL,MOTRIN) 400 MG tablet, Take 400 mg by mouth Every 6 (Six) Hours As Needed for Mild Pain ., Disp: , Rfl:   •  InFLIXimab (REMICADE IV), Infuse 1 Bag into a venous catheter Take As Directed. TWICE MONTHLY, Disp: , Rfl:   •  isoniazid (NYDRAZID) 300 MG tablet, 1 tablet Daily., Disp: , Rfl:   •  LORazepam (ATIVAN) 1 MG tablet, As Needed., Disp: , Rfl:   •  metoprolol succinate XL (TOPROL-XL) 25 MG 24 hr tablet, Take 25 mg by mouth Daily., Disp: , Rfl:   •  predniSONE (DELTASONE) 5 MG tablet, Daily., Disp: , Rfl:   •  progesterone (PROMETRIUM) 100 MG capsule, Daily., Disp: , Rfl:   •  pyrazinamide 500 MG tablet, 4 tablets Daily., Disp: , Rfl:   •  rifAMPin (RIFADIN) 300 MG capsule, 2 capsules Daily., Disp: , Rfl:   •  vitamin B-6 (PYRIDOXINE) 50 MG tablet, Take 50 mg by mouth Daily., Disp: , Rfl:     Past Medical History:   Diagnosis Date   • Acid reflux    • Anxiety    • Bowel trouble    • BRCA gene positive    • Constipation    • Crohn's disease (CMS/HCC)    • Diarrhea    • Hypertension    • TB (tuberculosis) 08/2018   • Wears glasses S     Past Surgical History:   Procedure Laterality Date   • ANKLE SURGERY Left     orif, fracture, later removal hardware   • APPENDECTOMY     • BILATERAL BREAST REDUCTION     • CHOLECYSTECTOMY     • COLON RESECTION Bilateral 11/6/2017    Procedure: ILEOCECOTOMY, BILATERAL SALPINGO OOPHERECTOMY;  Surgeon: Ajith Mcduffie MD;  [facility];  Service:    • COLONOSCOPY      5-2017   • EXPLORATORY LAPAROTOMY  1997   • OOPHORECTOMY       Social History     Social History   • Marital status:    • Number of children: 0  "    Occupational History   • dental hygenist      Social History Main Topics   • Smoking status: Former Smoker     Packs/day: 0.25     Years: 27.00     Types: Cigarettes     Quit date: 7/1/2018   • Smokeless tobacco: Never Used   • Alcohol use No   • Drug use: No   • Sexual activity: Defer     Other Topics Concern   • Not on file     Family History   Problem Relation Age of Onset   • Breast cancer Mother    • Hypertension Father    • Breast cancer Maternal Grandmother    • Ovarian cancer Maternal Grandmother      Blood pressure 148/98, pulse 93, temperature 97.3 °F (36.3 °C), resp. rate 16, height 162.6 cm (64\"), weight 89.8 kg (198 lb), SpO2 98 %.    Physical Exam   Constitutional: She is oriented to person, place, and time. She appears well-developed and well-nourished.   HENT:   Head: Normocephalic and atraumatic.   Clear PND   Eyes: Pupils are equal, round, and reactive to light. No scleral icterus.   Neck: Normal range of motion. Neck supple. No JVD present. No tracheal deviation present. No thyromegaly present.   Cardiovascular: Normal rate, regular rhythm and normal heart sounds.    No murmur heard.  Pulmonary/Chest: Effort normal and breath sounds normal. No respiratory distress. She has no wheezes.   Abdominal: Soft. Bowel sounds are normal. She exhibits no distension. There is no tenderness.   Musculoskeletal: Normal range of motion. She exhibits no edema or tenderness.   Lymphadenopathy:     She has no cervical adenopathy.   Neurological: She is alert and oriented to person, place, and time.   Skin: Skin is warm and dry.   Psychiatric: She has a normal mood and affect.         PFTs:    Radiology:  I reviewed her CT scan and she does have significant bilateral nodular infiltrates, more prominent in the upper lobes, no visible adenopathy  Lab:    Gloria was seen today for lung nodule.    Diagnoses and all orders for this visit:    Positive QuantiFERON-TB Gold test    Pulmonary infiltrates, " nodular        Discussion:   Interesting 42-year-old woman, previous smoker with recent cessation, with a known history of Crohn's disease on Remicade. Prior to initiating therapy her PPD was negative. Now she presents with a positive TB QuantiFERON Gold test and nodular infiltrates. Of course I concur with infectious disease that the primary concern initially is to exclude active tuberculous infection. There are 3 sputum's in the microbiology lab. If these are smear positive then we need no further diagnostic testing and she can initiate her therapy. If the smears are negative I will proceed with bronchoscopy and lavage next week. Further, I would also consider a navigational bronchoscopy with biopsy. This could be TB, fungal infection, sarcoid, metastatic cancer. Crohn's disease can be associated with some interstitial lung disease but there is no sign of that on her CT scan. She has no palpable thyroid nodule and is currently with her colonoscopies and mammograms.    I have tentatively set up a bronchoscopy for September 6.    Christy Herrera MD

## 2018-09-09 LAB
BACTERIA SPEC RESP CULT: NORMAL
BACTERIA SPEC RESP CULT: NORMAL
GRAM STN SPEC: NORMAL

## 2018-09-11 LAB
FLUAV RNA SPEC QL NAA+PROBE: NEGATIVE
FLUAV RNA SPEC QL NAA+PROBE: NEGATIVE
FLUBV RNA SPEC QL NAA+PROBE: NEGATIVE
FLUBV RNA SPEC QL NAA+PROBE: NEGATIVE
GALACTOMANNAN AG SPEC IA-ACNC: 0.13 INDEX (ref 0–0.49)
HADV DNA SPEC QL NAA+PROBE: NEGATIVE
HADV DNA SPEC QL NAA+PROBE: NEGATIVE
HMPV RNA SPEC QL NAA+PROBE: NEGATIVE
HMPV RNA SPEC QL NAA+PROBE: NEGATIVE
HPIV1 RNA SPEC QL NAA+PROBE: NEGATIVE
HPIV1 RNA SPEC QL NAA+PROBE: NEGATIVE
HPIV2 SPEC QL CULT: NEGATIVE
HPIV2 SPEC QL CULT: NEGATIVE
HPIV3 SPEC QL CULT: NEGATIVE
HPIV3 SPEC QL CULT: NEGATIVE
HSV1 DNA SPEC QL NAA+PROBE: NORMAL
HSV1 DNA SPEC QL NAA+PROBE: NORMAL
HSV2 DNA SPEC QL NAA+PROBE: NORMAL
HSV2 DNA SPEC QL NAA+PROBE: NORMAL
L PNEUMO AG SPEC QL: NOT DETECTED
L PNEUMO AG SPEC QL: NOT DETECTED
LAB AP CASE REPORT: NORMAL
LEGIONELLA DNA SPEC NAA+PROBE: NOT DETECTED
LEGIONELLA DNA SPEC NAA+PROBE: NOT DETECTED
PATH REPORT.FINAL DX SPEC: NORMAL
RHINOVIRUS RNA SPEC QL NAA+PROBE: NEGATIVE
RHINOVIRUS RNA SPEC QL NAA+PROBE: NEGATIVE
RSV A: NEGATIVE
RSV A: NEGATIVE
RSV B RNA SPEC QL NAA+PROBE: NEGATIVE
RSV B RNA SPEC QL NAA+PROBE: NEGATIVE
SPECIMEN STATUS: NORMAL
SPECIMEN STATUS: NORMAL

## 2018-09-12 LAB
CMV DNA SPEC QL NAA+PROBE: NEGATIVE
SPECIMEN SOURCE: NEGATIVE
SPECIMEN SOURCE: NEGATIVE
SPECIMEN SOURCE: NORMAL

## 2018-09-17 LAB
HSV TYPE 1: NORMAL COPIES/ML
HSV TYPE 1: NORMAL COPIES/ML
HSV2 DNA SPEC QL NAA+PROBE: NORMAL
HSV2 DNA SPEC QL NAA+PROBE: NORMAL

## 2018-09-18 ENCOUNTER — TRANSCRIBE ORDERS (OUTPATIENT)
Dept: PULMONOLOGY | Facility: CLINIC | Age: 43
End: 2018-09-18

## 2018-09-18 LAB
CMV DNA SPEC QL NAA+PROBE: NORMAL
SPECIMEN SOURCE: NORMAL

## 2018-09-21 ENCOUNTER — OFFICE VISIT (OUTPATIENT)
Dept: PULMONOLOGY | Facility: CLINIC | Age: 43
End: 2018-09-21

## 2018-09-21 VITALS
WEIGHT: 202.25 LBS | HEART RATE: 102 BPM | DIASTOLIC BLOOD PRESSURE: 118 MMHG | TEMPERATURE: 97.5 F | SYSTOLIC BLOOD PRESSURE: 148 MMHG | HEIGHT: 64 IN | OXYGEN SATURATION: 96 % | RESPIRATION RATE: 20 BRPM | BODY MASS INDEX: 34.53 KG/M2

## 2018-09-21 DIAGNOSIS — R76.12 POSITIVE QUANTIFERON-TB GOLD TEST: ICD-10-CM

## 2018-09-21 DIAGNOSIS — I10 ESSENTIAL HYPERTENSION: Primary | ICD-10-CM

## 2018-09-21 PROCEDURE — 99213 OFFICE O/P EST LOW 20 MIN: CPT | Performed by: NURSE PRACTITIONER

## 2018-09-21 RX ORDER — METHYLPREDNISOLONE 4 MG/1
TABLET ORAL DAILY
COMMUNITY
Start: 2018-09-17 | End: 2018-09-24

## 2018-09-21 NOTE — PROGRESS NOTES
[facility] Pulmonary Follow up    CHIEF COMPLAINT    Follow-up bronchoscopy    HISTORY OF PRESENT ILLNESS    Gloria Smith is a 42 y.o.female here today for follow-up from her bronchoscopy on September 7th with Dr. Herrera.  She has been doing well since the bronchoscopy and denies any current symptoms.  She is anxious for the results of the bronchoscopy.      She has a history of Crohn's disease and has been on a Prednisone taper per her rheumatologist.  She receives Remicade infusions monthly.  She was in the process of switching to a new drug by her rheumatologist and obtained a quantitative fairon TB test which was positive. She had a negative PPD in June 2017 as well as in 2014. A chest x-ray was obtained and was abnormal and she had a subsequent CT scan that revealed multiple pulmonary nodular opacities and consolidation. She was evaluated by infectious disease and she dropped off 3 sputum test for AFB yesterday morning. She does not have fever chills night sweats or weight loss.  She was referred to our office for follow-up of the TB test.    She denies fever, chills, hemoptysis, sputum production, chest pain or palpitations.  Her BP was elevated today, however she has not taken her blood pressure medications and she has not been sleeping well due to the prednisone.        Patient Active Problem List   Diagnosis   • Crohn disease (CMS/HCC)   • Hypertension   • Anemia, popst-op   • Positive QuantiFERON-TB Gold test   • Pulmonary infiltrates, nodular       No Known Allergies    Current Outpatient Prescriptions:   •  escitalopram (LEXAPRO) 10 MG tablet, Take 10 mg by mouth Daily., Disp: , Rfl:   •  estradiol (VIVELLE-DOT) 0.025 MG/24HR patch, estradiol 0.025 mg/24 hr semiweekly transdermal patch  Apply 1 patch twice a week by transdermal route., Disp: , Rfl:   •  ibuprofen (ADVIL,MOTRIN) 400 MG tablet, Take 400 mg by mouth Every 6 (Six) Hours As Needed for Mild Pain ., Disp: , Rfl:   •  InFLIXimab (REMICADE  IV), Infuse 1 Bag into a venous catheter Take As Directed. TWICE MONTHLY, Disp: , Rfl:   •  isoniazid (NYDRAZID) 300 MG tablet, 1 tablet Daily., Disp: , Rfl:   •  loratadine (CLARITIN) 10 MG tablet, Take 10 mg by mouth Daily., Disp: , Rfl:   •  LORazepam (ATIVAN) 1 MG tablet, Take 1 mg by mouth As Needed., Disp: , Rfl:   •  MethylPREDNISolone (MEDROL, ADAMS,) 4 MG tablet, Take  by mouth Daily., Disp: , Rfl:   •  metoprolol succinate XL (TOPROL-XL) 25 MG 24 hr tablet, Take 25 mg by mouth Daily., Disp: , Rfl:   •  predniSONE (DELTASONE) 5 MG tablet, Take 5 mg by mouth Daily., Disp: , Rfl:   •  progesterone (PROMETRIUM) 100 MG capsule, Take 100 mg by mouth Daily., Disp: , Rfl:   •  pyrazinamide 500 MG tablet, 4 tablets Daily., Disp: , Rfl:   •  rifAMPin (RIFADIN) 300 MG capsule, 2 capsules Daily., Disp: , Rfl:   •  vitamin B-6 (PYRIDOXINE) 50 MG tablet, Take 50 mg by mouth Daily., Disp: , Rfl:   MEDICATION LIST AND ALLERGIES REVIEWED.    Social History   Substance Use Topics   • Smoking status: Former Smoker     Packs/day: 0.25     Years: 27.00     Types: Cigarettes     Quit date: 7/1/2018   • Smokeless tobacco: Never Used   • Alcohol use No       FAMILY AND SOCIAL HISTORY REVIEWED.    Review of Systems   Constitutional: Positive for fatigue. Negative for activity change, appetite change, fever and unexpected weight change.   HENT: Negative for congestion, postnasal drip, rhinorrhea, sinus pressure, sore throat and voice change.    Eyes: Negative for visual disturbance.   Respiratory: Negative for cough, chest tightness, shortness of breath and wheezing.    Cardiovascular: Negative for chest pain, palpitations and leg swelling.   Gastrointestinal: Negative for abdominal distention, abdominal pain, nausea and vomiting.   Endocrine: Negative for cold intolerance and heat intolerance.   Genitourinary: Negative for difficulty urinating and urgency.   Musculoskeletal: Negative for arthralgias, back pain and neck pain.   Skin:  "Negative for color change and pallor.   Allergic/Immunologic: Negative for environmental allergies and food allergies.   Neurological: Negative for dizziness, syncope, weakness and light-headedness.   Hematological: Negative for adenopathy. Does not bruise/bleed easily.   Psychiatric/Behavioral: Negative for agitation and behavioral problems.   .    BP (!) 148/118 (BP Location: Left arm, Patient Position: Sitting, Cuff Size: Adult)   Pulse 102   Temp 97.5 °F (36.4 °C)   Resp 20   Ht 162.6 cm (64\")   Wt 91.7 kg (202 lb 4 oz)   SpO2 96% Comment: RA AT REST  BMI 34.72 kg/m²     Immunization History   Administered Date(s) Administered   • Influenza, Unspecified 11/20/2017       Physical Exam   Constitutional: She is oriented to person, place, and time. She appears well-developed and well-nourished.   HENT:   Head: Normocephalic and atraumatic.   Eyes: Pupils are equal, round, and reactive to light.   Neck: Normal range of motion. Neck supple. No thyromegaly present.   Cardiovascular: Normal rate, regular rhythm, normal heart sounds and intact distal pulses.  Exam reveals no gallop and no friction rub.    No murmur heard.  Pulmonary/Chest: Effort normal and breath sounds normal. No respiratory distress. She has no wheezes. She has no rales. She exhibits no tenderness.   Abdominal: Soft. Bowel sounds are normal. There is no tenderness.   Musculoskeletal: Normal range of motion.   Lymphadenopathy:     She has no cervical adenopathy.   Neurological: She is alert and oriented to person, place, and time.   Skin: Skin is warm and dry. Capillary refill takes less than 2 seconds. She is not diaphoretic.   Psychiatric: She has a normal mood and affect. Her behavior is normal.   Nursing note and vitals reviewed.        RESULTS    Bronchoscopy 9/7/18:    Final Diagnosis   1. BRONCHIAL LAVAGE, RIGHT LOWER LOBE:  Negative for malignant cells.  2. BRONCHIAL LAVAGE, RIGHT UPPER LOBE:  Negative for malignant cells.        All " other testing sent with the bronchoscopy has been negative up to this date.     PROBLEM LIST    Problem List Items Addressed This Visit        Cardiovascular and Mediastinum    Hypertension - Primary       Immune and Lymphatic    Positive QuantiFERON-TB Gold test            DISCUSSION  Mrs. Smith was here for bronchoscopy results.  She has been on a prednisone taper and this has caused her to not sleep well at night and her BP was elevated today at 148/118.  I advised her to take be BP medications when she left the office.  She states she may have been nervous about the results of the bronchoscopy.      We discussed the bronchoscopy results and I explained it would take up to 6 weeks for the AFB and fungal results to be official.  So far they are negative.  She is scheduled for a repeat CT scan next week and I will call her with the results.  We will also have her a follow-up scheduled after her CT scan is complete.    I will call her with any abnormal results from her bronchoscopy.  Hopefully, she will be able to switch to the newer medication for her Crohn's next month.     I will call her with results from her CT scan next week and schedule her a follow-up at that time as well.  She can return sooner if her symptoms worsen.  I spent 15 minutes with the patient. I spent > 50% percent of this time counseling and discussing diagnosis, diagnostic testing, evaluation, current status and management.    Nicole Lagos, APRN  09/21/201810:39 AM  Electronically signed     Please note that portions of this note were completed with a voice recognition program. Efforts were made to edit the dictations, but occasionally words are mistranscribed.      CC: Shaun Taylor MD

## 2018-09-24 ENCOUNTER — HOSPITAL ENCOUNTER (EMERGENCY)
Facility: HOSPITAL | Age: 43
Discharge: HOME | End: 2018-09-24
Attending: EMERGENCY MEDICINE | Admitting: EMERGENCY MEDICINE

## 2018-09-24 ENCOUNTER — APPOINTMENT (OUTPATIENT)
Dept: CT IMAGING | Facility: HOSPITAL | Age: 43
End: 2018-09-24

## 2018-09-24 ENCOUNTER — APPOINTMENT (OUTPATIENT)
Dept: GENERAL RADIOLOGY | Facility: HOSPITAL | Age: 43
End: 2018-09-24

## 2018-09-24 VITALS
SYSTOLIC BLOOD PRESSURE: 101 MMHG | BODY MASS INDEX: 34.15 KG/M2 | WEIGHT: 200 LBS | RESPIRATION RATE: 16 BRPM | TEMPERATURE: 98.3 F | DIASTOLIC BLOOD PRESSURE: 76 MMHG | HEART RATE: 93 BPM | OXYGEN SATURATION: 96 % | HEIGHT: 64 IN

## 2018-09-24 DIAGNOSIS — R07.9 CHEST PAIN, UNSPECIFIED TYPE: Primary | ICD-10-CM

## 2018-09-24 DIAGNOSIS — R51.9 NONINTRACTABLE HEADACHE, UNSPECIFIED CHRONICITY PATTERN, UNSPECIFIED HEADACHE TYPE: ICD-10-CM

## 2018-09-24 DIAGNOSIS — R93.89 ABNORMAL CHEST CT: ICD-10-CM

## 2018-09-24 DIAGNOSIS — R09.02 HYPOXEMIA: ICD-10-CM

## 2018-09-24 LAB
ALBUMIN SERPL-MCNC: 4.16 G/DL (ref 3.2–4.8)
ALBUMIN/GLOB SERPL: 1.3 G/DL (ref 1.5–2.5)
ALP SERPL-CCNC: 55 U/L (ref 25–100)
ALT SERPL W P-5'-P-CCNC: 66 U/L (ref 7–40)
ANION GAP SERPL CALCULATED.3IONS-SCNC: 7 MMOL/L (ref 3–11)
AST SERPL-CCNC: 47 U/L (ref 0–33)
BASOPHILS # BLD AUTO: 0.02 10*3/MM3 (ref 0–0.2)
BASOPHILS NFR BLD AUTO: 0.2 % (ref 0–1)
BILIRUB SERPL-MCNC: 0.6 MG/DL (ref 0.3–1.2)
BNP SERPL-MCNC: 11 PG/ML (ref 0–100)
BUN BLD-MCNC: 11 MG/DL (ref 9–23)
BUN/CREAT SERPL: 22 (ref 7–25)
CALCIUM SPEC-SCNC: 9.5 MG/DL (ref 8.7–10.4)
CHLORIDE SERPL-SCNC: 101 MMOL/L (ref 99–109)
CO2 SERPL-SCNC: 26 MMOL/L (ref 20–31)
CREAT BLD-MCNC: 0.5 MG/DL (ref 0.6–1.3)
D DIMER PPP FEU-MCNC: 1.1 MG/L (FEU) (ref 0–0.5)
DEPRECATED RDW RBC AUTO: 42.1 FL (ref 37–54)
EOSINOPHIL # BLD AUTO: 0.16 10*3/MM3 (ref 0–0.3)
EOSINOPHIL NFR BLD AUTO: 1.4 % (ref 0–3)
ERYTHROCYTE [DISTWIDTH] IN BLOOD BY AUTOMATED COUNT: 12.3 % (ref 11.3–14.5)
GFR SERPL CREATININE-BSD FRML MDRD: 134 ML/MIN/1.73
GLOBULIN UR ELPH-MCNC: 3.1 GM/DL
GLUCOSE BLD-MCNC: 101 MG/DL (ref 70–100)
HCT VFR BLD AUTO: 42.1 % (ref 34.5–44)
HGB BLD-MCNC: 14.3 G/DL (ref 11.5–15.5)
HOLD SPECIMEN: NORMAL
HOLD SPECIMEN: NORMAL
IMM GRANULOCYTES # BLD: 0.03 10*3/MM3 (ref 0–0.03)
IMM GRANULOCYTES NFR BLD: 0.3 % (ref 0–0.6)
LIPASE SERPL-CCNC: 62 U/L (ref 6–51)
LYMPHOCYTES # BLD AUTO: 0.77 10*3/MM3 (ref 0.6–4.8)
LYMPHOCYTES NFR BLD AUTO: 6.6 % (ref 24–44)
MCH RBC QN AUTO: 32.1 PG (ref 27–31)
MCHC RBC AUTO-ENTMCNC: 34 G/DL (ref 32–36)
MCV RBC AUTO: 94.6 FL (ref 80–99)
MONOCYTES # BLD AUTO: 0.48 10*3/MM3 (ref 0–1)
MONOCYTES NFR BLD AUTO: 4.1 % (ref 0–12)
NEUTROPHILS # BLD AUTO: 10.25 10*3/MM3 (ref 1.5–8.3)
NEUTROPHILS NFR BLD AUTO: 87.7 % (ref 41–71)
PLATELET # BLD AUTO: 256 10*3/MM3 (ref 150–450)
PMV BLD AUTO: 10.2 FL (ref 6–12)
POTASSIUM BLD-SCNC: 3.8 MMOL/L (ref 3.5–5.5)
PROT SERPL-MCNC: 7.3 G/DL (ref 5.7–8.2)
RBC # BLD AUTO: 4.45 10*6/MM3 (ref 3.89–5.14)
SODIUM BLD-SCNC: 134 MMOL/L (ref 132–146)
TROPONIN I SERPL-MCNC: <0.006 NG/ML
TROPONIN I SERPL-MCNC: <0.006 NG/ML
WBC NRBC COR # BLD: 11.68 10*3/MM3 (ref 3.5–10.8)
WHOLE BLOOD HOLD SPECIMEN: NORMAL
WHOLE BLOOD HOLD SPECIMEN: NORMAL

## 2018-09-24 PROCEDURE — 96375 TX/PRO/DX INJ NEW DRUG ADDON: CPT

## 2018-09-24 PROCEDURE — 0 IOPAMIDOL PER 1 ML: Performed by: EMERGENCY MEDICINE

## 2018-09-24 PROCEDURE — 80053 COMPREHEN METABOLIC PANEL: CPT | Performed by: EMERGENCY MEDICINE

## 2018-09-24 PROCEDURE — 25010000002 FENTANYL CITRATE (PF) 100 MCG/2ML SOLUTION: Performed by: EMERGENCY MEDICINE

## 2018-09-24 PROCEDURE — 25010000002 KETOROLAC TROMETHAMINE PER 15 MG: Performed by: EMERGENCY MEDICINE

## 2018-09-24 PROCEDURE — 85025 COMPLETE CBC W/AUTO DIFF WBC: CPT | Performed by: EMERGENCY MEDICINE

## 2018-09-24 PROCEDURE — 96374 THER/PROPH/DIAG INJ IV PUSH: CPT

## 2018-09-24 PROCEDURE — 96376 TX/PRO/DX INJ SAME DRUG ADON: CPT

## 2018-09-24 PROCEDURE — 71275 CT ANGIOGRAPHY CHEST: CPT

## 2018-09-24 PROCEDURE — 71045 X-RAY EXAM CHEST 1 VIEW: CPT

## 2018-09-24 PROCEDURE — 99285 EMERGENCY DEPT VISIT HI MDM: CPT

## 2018-09-24 PROCEDURE — 93005 ELECTROCARDIOGRAM TRACING: CPT | Performed by: EMERGENCY MEDICINE

## 2018-09-24 PROCEDURE — 85379 FIBRIN DEGRADATION QUANT: CPT | Performed by: EMERGENCY MEDICINE

## 2018-09-24 PROCEDURE — 83690 ASSAY OF LIPASE: CPT | Performed by: EMERGENCY MEDICINE

## 2018-09-24 PROCEDURE — 84484 ASSAY OF TROPONIN QUANT: CPT | Performed by: EMERGENCY MEDICINE

## 2018-09-24 PROCEDURE — 83880 ASSAY OF NATRIURETIC PEPTIDE: CPT | Performed by: EMERGENCY MEDICINE

## 2018-09-24 RX ORDER — KETOROLAC TROMETHAMINE 15 MG/ML
10 INJECTION, SOLUTION INTRAMUSCULAR; INTRAVENOUS ONCE
Status: COMPLETED | OUTPATIENT
Start: 2018-09-24 | End: 2018-09-24

## 2018-09-24 RX ORDER — FENTANYL CITRATE 50 UG/ML
100 INJECTION, SOLUTION INTRAMUSCULAR; INTRAVENOUS AS NEEDED
Status: DISCONTINUED | OUTPATIENT
Start: 2018-09-24 | End: 2018-09-24 | Stop reason: HOSPADM

## 2018-09-24 RX ORDER — SODIUM CHLORIDE 0.9 % (FLUSH) 0.9 %
10 SYRINGE (ML) INJECTION AS NEEDED
Status: DISCONTINUED | OUTPATIENT
Start: 2018-09-24 | End: 2018-09-24 | Stop reason: HOSPADM

## 2018-09-24 RX ORDER — OXYCODONE AND ACETAMINOPHEN 7.5; 325 MG/1; MG/1
1 TABLET ORAL EVERY 6 HOURS PRN
Qty: 12 TABLET | Refills: 0 | Status: ON HOLD | OUTPATIENT
Start: 2018-09-24 | End: 2018-11-09

## 2018-09-24 RX ORDER — METOPROLOL TARTRATE 5 MG/5ML
5 INJECTION INTRAVENOUS
Status: DISCONTINUED | OUTPATIENT
Start: 2018-09-24 | End: 2018-09-24 | Stop reason: HOSPADM

## 2018-09-24 RX ORDER — OXYCODONE AND ACETAMINOPHEN 10; 325 MG/1; MG/1
1 TABLET ORAL ONCE
Status: COMPLETED | OUTPATIENT
Start: 2018-09-24 | End: 2018-09-24

## 2018-09-24 RX ADMIN — METOPROLOL TARTRATE 5 MG: 1 INJECTION, SOLUTION INTRAVENOUS at 14:29

## 2018-09-24 RX ADMIN — KETOROLAC TROMETHAMINE 10 MG: 15 INJECTION, SOLUTION INTRAMUSCULAR; INTRAVENOUS at 17:39

## 2018-09-24 RX ADMIN — OXYCODONE HYDROCHLORIDE AND ACETAMINOPHEN 1 TABLET: 10; 325 TABLET ORAL at 18:55

## 2018-09-24 RX ADMIN — METOPROLOL TARTRATE 5 MG: 1 INJECTION, SOLUTION INTRAVENOUS at 14:13

## 2018-09-24 RX ADMIN — IOPAMIDOL 65 ML: 755 INJECTION, SOLUTION INTRAVENOUS at 15:38

## 2018-09-24 RX ADMIN — FENTANYL CITRATE 100 MCG: 50 INJECTION INTRAMUSCULAR; INTRAVENOUS at 14:15

## 2018-09-24 RX ADMIN — FENTANYL CITRATE 100 MCG: 50 INJECTION INTRAMUSCULAR; INTRAVENOUS at 15:52

## 2018-09-24 NOTE — ED PROVIDER NOTES
Subjective   Gloria Smith is a 42 y.o.female who presents to the ED via EMS with complaints of chest pain. The patient states that she went to bed last night and was having hot sweats and difficulty sleeping. She developed substernal chest pressure at around 0900 today which has been constant in nature. This pain has made it difficult for her to take deep breaths. She went home from work at lunch and attempted to rest, but her pain did not subside so she called EMS. She took 600 mgs of ibuprofen at 0600 and 1230 today. She also took a full aspirin around 1245. Both have provided no relief. The nitroglycerin EMS provided has not provided any relief. She also endorses having associated head and neck pressure. She denies any sore throat, cough, or rhinorrhea. She had recently been treated for the possibility of TB, but further testing showed that she was negative. She takes Remicade for Crohn's disease which she states is currently in remission. She is trying to change medications for treatment of her arthritis which has recently manifested. She does have a history of high blood pressure and states that her blood pressure was elevated today. She takes Metoprolol for her HTN and took 50 mg this morning. She follows up with Dr. Adkins for lung nodules. She denies any history of diabetes, CAD, or HLD. Her father has a history of CAD. She is not currently exercising and states that she is not very active. She is a former smoker who quit 6-7 months ago. She has a surgical history significant for an oophorectomy, cholecystectomy, appendectomy, and a bilateral breast reduction. She adds she has been under increased stress lately. She is on hormone replacement. There are no other acute complaints at this time.             History provided by:  Patient  Chest Pain   Pain location:  Substernal area  Pain quality: pressure    Pain radiates to:  Does not radiate  Pain severity:  Moderate  Onset quality:  Sudden  Duration:   5 hours  Timing:  Constant  Progression:  Unchanged  Chronicity:  New  Relieved by:  Nothing  Worsened by:  Nothing  Ineffective treatments:  Aspirin, nitroglycerin and rest (and ibuprofen)  Associated symptoms: diaphoresis (last night), headache (pressure) and shortness of breath (when trying to take deep breaths.. )    Associated symptoms: no cough    Risk factors: hypertension    Risk factors: no coronary artery disease and no high cholesterol        Review of Systems   Constitutional: Positive for diaphoresis (last night).   HENT: Negative for rhinorrhea and sore throat.    Respiratory: Positive for shortness of breath (when trying to take deep breaths.. ). Negative for cough.    Cardiovascular: Positive for chest pain.   Musculoskeletal: Positive for neck pain.   Neurological: Positive for headaches (pressure).   All other systems reviewed and are negative.      Past Medical History:   Diagnosis Date   • Acid reflux    • Anxiety    • Arthritis    • Bowel trouble    • BRCA gene positive    • Constipation    • Crohn's disease (CMS/HCC)    • Diarrhea    • History of transfusion    • Hypertension    • TB (pulmonary tuberculosis)    • TB (tuberculosis) 08/2018   • Wears glasses S       No Known Allergies    Past Surgical History:   Procedure Laterality Date   • ANKLE SURGERY Left     orif, fracture, later removal hardware   • APPENDECTOMY     • BILATERAL BREAST REDUCTION     • BREAST BIOPSY     • BRONCHOSCOPY N/A 9/7/2018    Procedure: BRONCHOSCOPY;  Surgeon: Christy Herrera MD;  Location:  [facility];  Service: Pulmonary   • CHOLECYSTECTOMY     • COLON RESECTION Bilateral 11/6/2017    Procedure: ILEOCECOTOMY, BILATERAL SALPINGO OOPHERECTOMY;  Surgeon: Ajith Mcduffie MD;  Location:  [facility];  Service:    • COLONOSCOPY      5-2017   • ENDOSCOPY     • EXPLORATORY LAPAROTOMY  1997   • OOPHORECTOMY     • TONSILLECTOMY     • WISDOM TOOTH EXTRACTION         Family History   Problem Relation Age of Onset   •  Breast cancer Mother    • Hypertension Father    • Breast cancer Maternal Grandmother    • Ovarian cancer Maternal Grandmother        Social History     Social History   • Marital status:    • Number of children: 0     Occupational History   • dental hygenist      Social History Main Topics   • Smoking status: Former Smoker     Packs/day: 0.25     Years: 27.00     Types: Cigarettes     Quit date: 7/1/2018   • Smokeless tobacco: Never Used   • Alcohol use No   • Drug use: No   • Sexual activity: Defer     Other Topics Concern   • Not on file         Objective   Physical Exam   Constitutional: She is oriented to person, place, and time. She appears well-developed and well-nourished. No distress.   HENT:   Head: Normocephalic and atraumatic.   Nose: Nose normal.   Eyes: Conjunctivae are normal. No scleral icterus.   Neck: Normal range of motion. Neck supple. No thyromegaly present.   Cardiovascular: Regular rhythm and normal heart sounds.  Tachycardia present.    No murmur heard.  Pulmonary/Chest: Effort normal and breath sounds normal. No respiratory distress. She has no wheezes. She has no rales. She exhibits tenderness.   Lat sternal border tenderness; not very reproducible.    Abdominal: Soft. Bowel sounds are normal. There is no tenderness.   Musculoskeletal: Normal range of motion. She exhibits no edema.   Lymphadenopathy:     She has no cervical adenopathy.   Neurological: She is alert and oriented to person, place, and time.   Skin: Skin is warm and dry.   Psychiatric: She has a normal mood and affect. Her behavior is normal.   Nursing note and vitals reviewed.      Procedures         ED Course  ED Course as of Sep 24 2112   Mon Sep 24, 2018   1452 Revisited the patient at bedside to discuss results of labs and the plan for her further treatment. Her symptoms remain unchanged at this citlaly.e   [TJ]   1615 CTA negative for PE, known abnormalities seen, followed by pulmonologist.  D-dimer likely elevated  secondary to known Crohn's disease.  No obvious answer as to her malaise today.  [LI]   1655 Reevaluated the patient at bedside to update her on the results of labs and imaging and the plan for further treatment. Her symptoms have not improved much.  [TJ]   1853 NOY report was not available.  I believe the medical necessity for and safety in prescribing the controlled substance substantially outweighs the risk of unlawful use or diversion of the controlled substance.    [TJ]   1853 No obvious emergent illness.  Suspicion that her symptoms are related to her rheumatologic problems.  She has pulm and rheum specialists working with her already.  Not able to make her comfortable here.  [LI]      ED Course User Index  [LI] Juni Huynh MD  [TJ] Abraham Arevalo       Recent Results (from the past 24 hour(s))   Comprehensive Metabolic Panel    Collection Time: 09/24/18  2:07 PM   Result Value Ref Range    Glucose 101 (H) 70 - 100 mg/dL    BUN 11 9 - 23 mg/dL    Creatinine 0.50 (L) 0.60 - 1.30 mg/dL    Sodium 134 132 - 146 mmol/L    Potassium 3.8 3.5 - 5.5 mmol/L    Chloride 101 99 - 109 mmol/L    CO2 26.0 20.0 - 31.0 mmol/L    Calcium 9.5 8.7 - 10.4 mg/dL    Total Protein 7.3 5.7 - 8.2 g/dL    Albumin 4.16 3.20 - 4.80 g/dL    ALT (SGPT) 66 (H) 7 - 40 U/L    AST (SGOT) 47 (H) 0 - 33 U/L    Alkaline Phosphatase 55 25 - 100 U/L    Total Bilirubin 0.6 0.3 - 1.2 mg/dL    eGFR Non African Amer 134 >60 mL/min/1.73    Globulin 3.1 gm/dL    A/G Ratio 1.3 (L) 1.5 - 2.5 g/dL    BUN/Creatinine Ratio 22.0 7.0 - 25.0    Anion Gap 7.0 3.0 - 11.0 mmol/L   Lipase    Collection Time: 09/24/18  2:07 PM   Result Value Ref Range    Lipase 62 (H) 6 - 51 U/L   BNP    Collection Time: 09/24/18  2:07 PM   Result Value Ref Range    BNP 11.0 0.0 - 100.0 pg/mL   Light Blue Top    Collection Time: 09/24/18  2:07 PM   Result Value Ref Range    Extra Tube hold for add-on    Green Top (Gel)    Collection Time: 09/24/18  2:07 PM   Result Value Ref  Range    Extra Tube Hold for add-ons.    Lavender Top    Collection Time: 09/24/18  2:07 PM   Result Value Ref Range    Extra Tube hold for add-on    Gold Top - SST    Collection Time: 09/24/18  2:07 PM   Result Value Ref Range    Extra Tube Hold for add-ons.    CBC Auto Differential    Collection Time: 09/24/18  2:07 PM   Result Value Ref Range    WBC 11.68 (H) 3.50 - 10.80 10*3/mm3    RBC 4.45 3.89 - 5.14 10*6/mm3    Hemoglobin 14.3 11.5 - 15.5 g/dL    Hematocrit 42.1 34.5 - 44.0 %    MCV 94.6 80.0 - 99.0 fL    MCH 32.1 (H) 27.0 - 31.0 pg    MCHC 34.0 32.0 - 36.0 g/dL    RDW 12.3 11.3 - 14.5 %    RDW-SD 42.1 37.0 - 54.0 fl    MPV 10.2 6.0 - 12.0 fL    Platelets 256 150 - 450 10*3/mm3    Neutrophil % 87.7 (H) 41.0 - 71.0 %    Lymphocyte % 6.6 (L) 24.0 - 44.0 %    Monocyte % 4.1 0.0 - 12.0 %    Eosinophil % 1.4 0.0 - 3.0 %    Basophil % 0.2 0.0 - 1.0 %    Immature Grans % 0.3 0.0 - 0.6 %    Neutrophils, Absolute 10.25 (H) 1.50 - 8.30 10*3/mm3    Lymphocytes, Absolute 0.77 0.60 - 4.80 10*3/mm3    Monocytes, Absolute 0.48 0.00 - 1.00 10*3/mm3    Eosinophils, Absolute 0.16 0.00 - 0.30 10*3/mm3    Basophils, Absolute 0.02 0.00 - 0.20 10*3/mm3    Immature Grans, Absolute 0.03 0.00 - 0.03 10*3/mm3   Troponin    Collection Time: 09/24/18  2:07 PM   Result Value Ref Range    Troponin I <0.006 <=0.039 ng/mL   D-dimer, Quantitative    Collection Time: 09/24/18  2:07 PM   Result Value Ref Range    D-Dimer, Quantitative 1.10 (H) 0.00 - 0.50 mg/L (FEU)   Troponin    Collection Time: 09/24/18  4:28 PM   Result Value Ref Range    Troponin I <0.006 <=0.039 ng/mL     Note: In addition to lab results from this visit, the labs listed above may include labs taken at another facility or during a different encounter within the last 24 hours. Please correlate lab times with ED admission and discharge times for further clarification of the services performed during this visit.    CT Angiogram Chest With Contrast   Final Result   Multiple  pulmonary nodules seen diffusely throughout the   lung fields bilaterally suggesting a diffuse metastatic process or an   infectious process in which continued followup is recommended as   indicated. There is no pleural effusion.       D:  09/24/2018   E:  09/24/2018               This report was finalized on 9/24/2018 5:03 PM by Dr. Josefina Quinones MD.          XR Chest 1 View   Final Result   Chronic changes identified with increased markings seen   within the left upper lobe and left lung base in which continued   follow-up is recommended.       D:  09/24/2018   E:  09/24/2018       This report was finalized on 9/24/2018 3:44 PM by Dr. Josefina Quinones MD.            Vitals:    09/24/18 1831 09/24/18 1845 09/24/18 1846 09/24/18 1847   BP: 108/64 101/76     Patient Position:       Pulse: 96 89 92 93   Resp: 16      Temp:       TempSrc:       SpO2:  95% 97% 96%   Weight:       Height:         Medications   iopamidol (ISOVUE-370) 76 % injection 100 mL (65 mL Intravenous Given 9/24/18 1538)   ketorolac (TORADOL) injection 10 mg (10 mg Intravenous Given 9/24/18 1739)   oxyCODONE-acetaminophen (PERCOCET)  MG per tablet 1 tablet (1 tablet Oral Given 9/24/18 1855)     ECG/EMG Results (last 24 hours)     Procedure Component Value Units Date/Time    ECG 12 Lead [[id number]] Collected:  09/24/18 1359     Updated:  09/24/18 1400                      MDM    Final diagnoses:   Chest pain, unspecified type   Nonintractable headache, unspecified chronicity pattern, unspecified headache type   Hypoxemia   Abnormal chest CT       Documentation assistance provided by lowell Arevalo.  Information recorded by the lowell was done at my direction and has been verified and validated by me.     Abraham Arevalo  09/24/18 1451       Abraham Arevalo  09/24/18 1657       Abraham Arevalo  09/24/18 1830       Abraham Arevalo  09/24/18 1858       Juni Huynh MD  09/24/18 9895

## 2018-09-24 NOTE — DISCHARGE INSTRUCTIONS
Activity as tolerated.    Contact Dr. Herrera's office tomorrow to inform them of your visit today and of the CT scan of your chest, then ask about follow-up care from them.    Stay in touch with Dr. Lopez regarding your biologic care.

## 2018-09-25 ENCOUNTER — TELEPHONE (OUTPATIENT)
Dept: PULMONOLOGY | Facility: CLINIC | Age: 43
End: 2018-09-25

## 2018-09-25 NOTE — TELEPHONE ENCOUNTER
Dr Olson with [facility] called wanting [facility] her positive QuantoFERON-Tb and bronch biopsy report sent to her and  Tameka at [facility]   Records were sent to both  Fax # [phone]

## 2018-09-28 ENCOUNTER — TELEPHONE (OUTPATIENT)
Dept: PULMONOLOGY | Facility: CLINIC | Age: 43
End: 2018-09-28

## 2018-09-28 DIAGNOSIS — R06.02 SHORTNESS OF BREATH: Primary | ICD-10-CM

## 2018-09-28 DIAGNOSIS — R91.1 PULMONARY NODULE: ICD-10-CM

## 2018-09-28 NOTE — TELEPHONE ENCOUNTER
I spoke with Mrs. Pinedo today about her recent CT scan and have discussed with Dr. Herrera about a possible open lung biopsy.  She is agreeable to meet with Dr. Oates to see what he thinks.  I placed the referral today.  I also ordered an ECHO to r/o any problems with her valves.  She is agreeable to this as well.  She will call with any additional questions.

## 2018-10-04 ENCOUNTER — HOSPITAL ENCOUNTER (OUTPATIENT)
Dept: CARDIOLOGY | Facility: HOSPITAL | Age: 43
Discharge: HOME | End: 2018-10-04
Admitting: NURSE PRACTITIONER

## 2018-10-04 VITALS — BODY MASS INDEX: 34.15 KG/M2 | HEIGHT: 64 IN | WEIGHT: 200 LBS

## 2018-10-04 DIAGNOSIS — R06.02 SHORTNESS OF BREATH: ICD-10-CM

## 2018-10-04 LAB
BH CV ECHO MEAS - AO ROOT AREA (BSA CORRECTED): 1.4
BH CV ECHO MEAS - AO ROOT AREA: 5.6 CM^2
BH CV ECHO MEAS - AO ROOT DIAM: 2.7 CM
BH CV ECHO MEAS - BSA(HAYCOCK): 2.1 M^2
BH CV ECHO MEAS - BSA: 2 M^2
BH CV ECHO MEAS - BZI_BMI: 34.3 KILOGRAMS/M^2
BH CV ECHO MEAS - BZI_METRIC_HEIGHT: 162.6 CM
BH CV ECHO MEAS - BZI_METRIC_WEIGHT: 90.7 KG
BH CV ECHO MEAS - EDV(CUBED): 63.8 ML
BH CV ECHO MEAS - EDV(MOD-SP2): 63 ML
BH CV ECHO MEAS - EDV(MOD-SP4): 62 ML
BH CV ECHO MEAS - EDV(TEICH): 69.8 ML
BH CV ECHO MEAS - EF(CUBED): 80.2 %
BH CV ECHO MEAS - EF(MOD-BP): 66 %
BH CV ECHO MEAS - EF(MOD-SP2): 68.3 %
BH CV ECHO MEAS - EF(MOD-SP4): 64.5 %
BH CV ECHO MEAS - EF(TEICH): 73.2 %
BH CV ECHO MEAS - ESV(CUBED): 12.7 ML
BH CV ECHO MEAS - ESV(MOD-SP2): 20 ML
BH CV ECHO MEAS - ESV(MOD-SP4): 22 ML
BH CV ECHO MEAS - ESV(TEICH): 18.7 ML
BH CV ECHO MEAS - FS: 41.7 %
BH CV ECHO MEAS - IVS/LVPW: 1
BH CV ECHO MEAS - IVSD: 1 CM
BH CV ECHO MEAS - LAD MAJOR: 4.4 CM
BH CV ECHO MEAS - LAT PEAK E' VEL: 10.5 CM/SEC
BH CV ECHO MEAS - LATERAL E/E' RATIO: 8
BH CV ECHO MEAS - LV DIASTOLIC VOL/BSA (35-75): 31.7 ML/M^2
BH CV ECHO MEAS - LV MASS(C)D: 125.7 GRAMS
BH CV ECHO MEAS - LV MASS(C)DI: 64.3 GRAMS/M^2
BH CV ECHO MEAS - LV SYSTOLIC VOL/BSA (12-30): 11.2 ML/M^2
BH CV ECHO MEAS - LVIDD: 4 CM
BH CV ECHO MEAS - LVIDS: 2.3 CM
BH CV ECHO MEAS - LVLD AP2: 7.4 CM
BH CV ECHO MEAS - LVLD AP4: 7.4 CM
BH CV ECHO MEAS - LVLS AP2: 6.2 CM
BH CV ECHO MEAS - LVLS AP4: 6 CM
BH CV ECHO MEAS - LVPWD: 0.99 CM
BH CV ECHO MEAS - MED PEAK E' VEL: 10.8 CM/SEC
BH CV ECHO MEAS - MEDIAL E/E' RATIO: 7.7
BH CV ECHO MEAS - MV A MAX VEL: 98.7 CM/SEC
BH CV ECHO MEAS - MV E MAX VEL: 85.8 CM/SEC
BH CV ECHO MEAS - MV E/A: 0.87
BH CV ECHO MEAS - PA ACC SLOPE: 758 CM/SEC^2
BH CV ECHO MEAS - PA ACC TIME: 0.1 SEC
BH CV ECHO MEAS - PA PR(ACCEL): 36.2 MMHG
BH CV ECHO MEAS - SI(CUBED): 26.2 ML/M^2
BH CV ECHO MEAS - SI(MOD-SP2): 22 ML/M^2
BH CV ECHO MEAS - SI(MOD-SP4): 20.4 ML/M^2
BH CV ECHO MEAS - SI(TEICH): 26.1 ML/M^2
BH CV ECHO MEAS - SV(CUBED): 51.2 ML
BH CV ECHO MEAS - SV(MOD-SP2): 43 ML
BH CV ECHO MEAS - SV(MOD-SP4): 40 ML
BH CV ECHO MEAS - SV(TEICH): 51.1 ML
BH CV ECHO MEAS - TAPSE (>1.6): 2.1 CM2
BH CV ECHO MEASUREMENTS AVERAGE E/E' RATIO: 8.06
BH CV VAS BP RIGHT ARM: NORMAL MMHG
BH CV XLRA - RV BASE: 3.9 CM
BH CV XLRA - RV LENGTH: 8 CM
BH CV XLRA - RV MID: 3.3 CM
BH CV XLRA - TDI S': 17.7 CM/SEC
LEFT ATRIUM VOLUME INDEX: 16.9 ML/M^2
LV EF 2D ECHO EST: 60 %

## 2018-10-04 PROCEDURE — 93306 TTE W/DOPPLER COMPLETE: CPT | Performed by: INTERNAL MEDICINE

## 2018-10-04 PROCEDURE — 93306 TTE W/DOPPLER COMPLETE: CPT

## 2018-10-10 LAB
MYCOBACTERIUM SPEC CULT: NORMAL
NIGHT BLUE STAIN TISS: NORMAL

## 2018-10-11 ENCOUNTER — TELEPHONE (OUTPATIENT)
Dept: PULMONOLOGY | Facility: CLINIC | Age: 43
End: 2018-10-11

## 2018-10-12 NOTE — TELEPHONE ENCOUNTER
Spoke with pt and informed her of ECHO results. Pt is needing to know what is your plans next. Pt informed that Nicole is currently out of the office today but will return on Monday and she will get in contact with her. Pt verbalized understanding.

## 2018-10-16 ENCOUNTER — TELEPHONE (OUTPATIENT)
Dept: PULMONOLOGY | Facility: CLINIC | Age: 43
End: 2018-10-16

## 2018-10-16 ENCOUNTER — OFFICE VISIT (OUTPATIENT)
Dept: CARDIAC SURGERY | Facility: CLINIC | Age: 43
End: 2018-10-16

## 2018-10-16 VITALS
TEMPERATURE: 98 F | SYSTOLIC BLOOD PRESSURE: 136 MMHG | HEART RATE: 92 BPM | WEIGHT: 206.6 LBS | BODY MASS INDEX: 35.27 KG/M2 | OXYGEN SATURATION: 98 % | HEIGHT: 64 IN | DIASTOLIC BLOOD PRESSURE: 95 MMHG

## 2018-10-16 DIAGNOSIS — R91.8 PULMONARY INFILTRATES: Primary | ICD-10-CM

## 2018-10-16 PROCEDURE — 99204 OFFICE O/P NEW MOD 45 MIN: CPT | Performed by: THORACIC SURGERY (CARDIOTHORACIC VASCULAR SURGERY)

## 2018-10-16 NOTE — TELEPHONE ENCOUNTER
Dr Kidd with [facility] contacted me requesting an update on Ms Smith. I let her know that she has an appointment scheduled with CT surgery today. Dr Kidd requests that we let her know if a biopsy is scheduled so that she can update the state lab. She can be reached at [phone]    I updated Dr Kidd that Ms Smith is scheduled for biopsy on Nov 8.

## 2018-10-16 NOTE — PROGRESS NOTES
10/16/2018  Patient Information  Gloria Smith                                                                                          [address] [facility] [address]   1975  'PCP/Referring Physician'  Shaun Taylor MD  [phone]  No ref. provider found    Chief Complaint   Patient presents with   • Shortness of Breath     Referred by Dr. Christy Herrera for lung nodules. Patient having pain in left chest area       History of Present Illness:  42-year-old  female with a history of hypertension, tobacco abuse and Crohn's disease who presents with lung nodules.  The patient had a normal chest x-ray last June in order to start Remicade for her Crohn's disease.  She has been transitioned from Remicade to Cimzia and a quantiferon gold TB test was positive.  Subsequent imaging has revealed lung nodules and consolidation.  She does note allergies with sinus drainage.  The patient denies cough, hemoptysis, weight loss, lymphadenopathy, fevers or chills.  She does have night sweats.      Patient Active Problem List   Diagnosis   • Crohn disease (CMS/HCC)   • Hypertension   • Anemia, popst-op   • Positive QuantiFERON-TB Gold test   • Pulmonary infiltrates, nodular     Past Medical History:   Diagnosis Date   • Acid reflux    • Anxiety    • Arthritis    • Bowel trouble    • BRCA gene positive    • Constipation    • Crohn's disease (CMS/HCC)    • Diarrhea    • History of transfusion    • Hypertension    • TB (pulmonary tuberculosis)    • TB (tuberculosis) 08/2018   • Wears glasses S     Past Surgical History:   Procedure Laterality Date   • ANKLE SURGERY Left     orif, fracture, later removal hardware   • APPENDECTOMY     • BILATERAL BREAST REDUCTION     • BREAST BIOPSY Left    • BRONCHOSCOPY N/A 9/7/2018    Procedure: BRONCHOSCOPY;  Surgeon: Christy Herrera MD;  Location: [facility];  Service: Pulmonary   • CHOLECYSTECTOMY     • COLON RESECTION Bilateral 11/6/2017    Procedure:  ILEOCECOTOMY, BILATERAL SALPINGO OOPHERECTOMY;  Surgeon: Ajith Mcduffie MD;  Location: [facility];  Service:    • COLONOSCOPY      5-2017   • ENDOSCOPY     • EXPLORATORY LAPAROTOMY  1997   • OOPHORECTOMY     • TONSILLECTOMY     • WISDOM TOOTH EXTRACTION         Current Outpatient Prescriptions:   •  escitalopram (LEXAPRO) 10 MG tablet, Take 10 mg by mouth Daily., Disp: , Rfl:   •  estradiol (VIVELLE-DOT) 0.025 MG/24HR patch, estradiol 0.025 mg/24 hr semiweekly transdermal patch  Apply 1 patch twice a week by transdermal route., Disp: , Rfl:   •  ibuprofen (ADVIL,MOTRIN) 400 MG tablet, Take 400 mg by mouth Every 6 (Six) Hours As Needed for Mild Pain ., Disp: , Rfl:   •  InFLIXimab (REMICADE IV), Infuse 1 Bag into a venous catheter Take As Directed. TWICE MONTHLY, Disp: , Rfl:   •  loratadine (CLARITIN) 10 MG tablet, Take 10 mg by mouth Daily., Disp: , Rfl:   •  metoprolol succinate XL (TOPROL-XL) 25 MG 24 hr tablet, Take 25 mg by mouth Daily., Disp: , Rfl:   •  progesterone (PROMETRIUM) 100 MG capsule, Take 100 mg by mouth Daily., Disp: , Rfl:   •  oxyCODONE-acetaminophen (PERCOCET) 7.5-325 MG per tablet, Take 1 tablet by mouth Every 6 (Six) Hours As Needed for Severe Pain ., Disp: 12 tablet, Rfl: 0  •  predniSONE (DELTASONE) 5 MG tablet, Take 5 mg by mouth Daily., Disp: , Rfl:   No Known Allergies  Social History     Social History   • Marital status:      Spouse name: N/A   • Number of children: 0   • Years of education: N/A     Occupational History   • dental hygenist      Social History Main Topics   • Smoking status: Former Smoker     Packs/day: 0.25     Years: 27.00     Types: Cigarettes     Quit date: 7/1/2018   • Smokeless tobacco: Never Used   • Alcohol use No      Comment: Socially   • Drug use: No   • Sexual activity: Defer     Other Topics Concern   • Not on file     Social History Narrative    Lives in [place] with spouse, Ajith     Family History   Problem Relation Age of Onset   • Breast  "cancer Mother    • Hypertension Father    • Breast cancer Maternal Grandmother    • Ovarian cancer Maternal Grandmother      Review of Systems   Constitution: Positive for diaphoresis and malaise/fatigue. Negative for chills, fever, night sweats and weight loss.   HENT: Negative for hearing loss, odynophagia and sore throat.    Cardiovascular: Positive for dyspnea on exertion. Negative for chest pain, leg swelling, orthopnea and palpitations.   Respiratory: Positive for shortness of breath and wheezing. Negative for cough and hemoptysis.    Endocrine: Negative for cold intolerance, heat intolerance, polydipsia, polyphagia and polyuria.   Hematologic/Lymphatic: Bruises/bleeds easily.   Skin: Negative for itching and rash.   Musculoskeletal: Positive for arthritis and joint pain. Negative for joint swelling and myalgias.   Gastrointestinal: Negative for abdominal pain, constipation, diarrhea, hematemesis, hematochezia, melena, nausea and vomiting.   Genitourinary: Negative for dysuria, frequency and hematuria.   Neurological: Negative for focal weakness, headaches, light-headedness, loss of balance, numbness and seizures.   Psychiatric/Behavioral: Negative for suicidal ideas.   All other systems reviewed and are negative.    Vitals:    10/16/18 1201   BP: 136/95   BP Location: Right arm   Patient Position: Sitting   Pulse: 92   Temp: 98 °F (36.7 °C)   SpO2: 98%   Weight: 93.7 kg (206 lb 9.6 oz)   Height: 162.6 cm (64\")      Physical Exam   Constitutional: She is oriented to person, place, and time. She appears well-developed and well-nourished. No distress.   HENT:   Head: Normocephalic and atraumatic.   Eyes: Conjunctivae and EOM are normal. No scleral icterus.   Neck: Normal range of motion. Neck supple. No JVD present. No tracheal deviation present.   Cardiovascular: Normal rate, regular rhythm and normal heart sounds.  Exam reveals no gallop and no friction rub.    No murmur heard.  Pulmonary/Chest: Effort normal " and breath sounds normal. No stridor. No respiratory distress. She has no wheezes. She has no rales.   Abdominal: Soft. She exhibits no distension and no mass. There is no tenderness. There is no rebound and no guarding.   Musculoskeletal: Normal range of motion. She exhibits no deformity.   Lymphadenopathy:     She has no cervical adenopathy.     She has no axillary adenopathy.        Right: No supraclavicular adenopathy present.        Left: No supraclavicular adenopathy present.   Neurological: She is alert and oriented to person, place, and time.   Skin: No rash noted. No erythema.   Psychiatric: She has a normal mood and affect. Her behavior is normal. Judgment and thought content normal.       Labs/Imaging:  -Bronchioalveolar lavage collected 9/7/18, negative for malignancy on cytology.  Cultures with normal respiratory luli, AFB and viral cultures negative.  -CT chest performed 9/24/18, personally reviewed, demonstrates multiple bilateral pulmonary nodules.  No pleural effusions  -Echocardiogram performed 10/4/18, personally reviewed, demonstrates EF 60%, no vegetation.           Assessment/Plan:  42-year-old  female with a history of hypertension, tobacco abuse and Crohn's disease who presents with bilateral lung nodules.  Differential in this patient is most likely infection given her immunosuppression.  Metastatic disease is also possible along with sarcoidosis.  Her bronchoscopy with cultures and cytology were negative.  The options included continued observation, navigational bronchoscopy versus bronchoscopy and left VATS lung biopsy.  The left lung lesions are most peripheral.  The risks and benefits of surgery were discussed with the patient including pain, bleeding, infection, respiratory failure requiring continued ventilation and tracheostomy, myocardial infarction and death.  She understood these risks and wished to proceed with surgery.          Patient Active Problem List   Diagnosis    • Crohn disease (CMS/HCC)   • Hypertension   • Anemia, popst-op   • Positive QuantiFERON-TB Gold test   • Pulmonary infiltrates, nodular

## 2018-10-19 LAB
FUNGUS WND CULT: NORMAL
FUNGUS WND CULT: NORMAL
MYCOBACTERIUM SPEC CULT: NORMAL
MYCOBACTERIUM SPEC CULT: NORMAL
NIGHT BLUE STAIN TISS: NORMAL
NIGHT BLUE STAIN TISS: NORMAL

## 2018-10-19 NOTE — TELEPHONE ENCOUNTER
Spoke with pt and she informed me that she has already scheduled an apt with Dr. Oates for biopsy on 11/8/18. Pt notified that we will inform Dr. Herrera.

## 2018-10-31 ENCOUNTER — PREP FOR SURGERY (OUTPATIENT)
Dept: OTHER | Facility: HOSPITAL | Age: 43
End: 2018-10-31

## 2018-10-31 DIAGNOSIS — R91.1 LUNG NODULE: Primary | ICD-10-CM

## 2018-10-31 RX ORDER — CHLORHEXIDINE GLUCONATE 500 MG/1
1 CLOTH TOPICAL EVERY 12 HOURS PRN
Status: CANCELLED | OUTPATIENT
Start: 2018-11-07

## 2018-11-06 ENCOUNTER — APPOINTMENT (OUTPATIENT)
Dept: PREADMISSION TESTING | Facility: HOSPITAL | Age: 43
End: 2018-11-06

## 2018-11-07 ENCOUNTER — APPOINTMENT (OUTPATIENT)
Dept: PREADMISSION TESTING | Facility: HOSPITAL | Age: 43
End: 2018-11-07

## 2018-11-07 ENCOUNTER — HOSPITAL ENCOUNTER (OUTPATIENT)
Dept: PULMONOLOGY | Facility: HOSPITAL | Age: 43
Discharge: HOME | End: 2018-11-07
Admitting: PHYSICIAN ASSISTANT

## 2018-11-07 ENCOUNTER — HOSPITAL ENCOUNTER (OUTPATIENT)
Dept: GENERAL RADIOLOGY | Facility: HOSPITAL | Age: 43
Discharge: HOME | End: 2018-11-07
Admitting: PHYSICIAN ASSISTANT

## 2018-11-07 VITALS — WEIGHT: 205.25 LBS | BODY MASS INDEX: 35.04 KG/M2 | HEIGHT: 64 IN

## 2018-11-07 DIAGNOSIS — R91.1 LUNG NODULE: ICD-10-CM

## 2018-11-07 LAB
ABO GROUP BLD: NORMAL
ALBUMIN SERPL-MCNC: 4.01 G/DL (ref 3.2–4.8)
ALP SERPL-CCNC: 57 U/L (ref 25–100)
ALT SERPL W P-5'-P-CCNC: 89 U/L (ref 7–40)
ANION GAP SERPL CALCULATED.3IONS-SCNC: 10 MMOL/L (ref 3–11)
AST SERPL-CCNC: 67 U/L (ref 0–33)
BASOPHILS # BLD AUTO: 0.01 10*3/MM3 (ref 0–0.2)
BASOPHILS NFR BLD AUTO: 0.3 % (ref 0–1)
BILIRUB CONJ SERPL-MCNC: 0.1 MG/DL (ref 0–0.2)
BILIRUB INDIRECT SERPL-MCNC: 0.2 MG/DL (ref 0.1–1.1)
BILIRUB SERPL-MCNC: 0.3 MG/DL (ref 0.3–1.2)
BLD GP AB SCN SERPL QL: NEGATIVE
BUN BLD-MCNC: 11 MG/DL (ref 9–23)
BUN/CREAT SERPL: 20.4 (ref 7–25)
CALCIUM SPEC-SCNC: 8.8 MG/DL (ref 8.7–10.4)
CHLORIDE SERPL-SCNC: 100 MMOL/L (ref 99–109)
CO2 SERPL-SCNC: 25 MMOL/L (ref 20–31)
CREAT BLD-MCNC: 0.54 MG/DL (ref 0.6–1.3)
DEPRECATED RDW RBC AUTO: 44.3 FL (ref 37–54)
EOSINOPHIL # BLD AUTO: 0.15 10*3/MM3 (ref 0–0.3)
EOSINOPHIL NFR BLD AUTO: 3.8 % (ref 0–3)
ERYTHROCYTE [DISTWIDTH] IN BLOOD BY AUTOMATED COUNT: 12.8 % (ref 11.3–14.5)
GFR SERPL CREATININE-BSD FRML MDRD: 122 ML/MIN/1.73
GLUCOSE BLD-MCNC: 86 MG/DL (ref 70–100)
HBA1C MFR BLD: 5 % (ref 4.8–5.6)
HCT VFR BLD AUTO: 38 % (ref 34.5–44)
HGB BLD-MCNC: 12.7 G/DL (ref 11.5–15.5)
IMM GRANULOCYTES # BLD: 0 10*3/MM3 (ref 0–0.03)
IMM GRANULOCYTES NFR BLD: 0 % (ref 0–0.6)
INR PPP: 0.92 (ref 0.91–1.09)
LYMPHOCYTES # BLD AUTO: 0.9 10*3/MM3 (ref 0.6–4.8)
LYMPHOCYTES NFR BLD AUTO: 22.8 % (ref 24–44)
MCH RBC QN AUTO: 32.2 PG (ref 27–31)
MCHC RBC AUTO-ENTMCNC: 33.4 G/DL (ref 32–36)
MCV RBC AUTO: 96.4 FL (ref 80–99)
MONOCYTES # BLD AUTO: 0.23 10*3/MM3 (ref 0–1)
MONOCYTES NFR BLD AUTO: 5.8 % (ref 0–12)
NEUTROPHILS # BLD AUTO: 2.65 10*3/MM3 (ref 1.5–8.3)
NEUTROPHILS NFR BLD AUTO: 67.3 % (ref 41–71)
PLATELET # BLD AUTO: 181 10*3/MM3 (ref 150–450)
PMV BLD AUTO: 10.1 FL (ref 6–12)
POTASSIUM BLD-SCNC: 3.9 MMOL/L (ref 3.5–5.5)
PROT SERPL-MCNC: 6.8 G/DL (ref 5.7–8.2)
PROTHROMBIN TIME: 9.7 SECONDS (ref 9.6–11.5)
RBC # BLD AUTO: 3.94 10*6/MM3 (ref 3.89–5.14)
RH BLD: POSITIVE
SODIUM BLD-SCNC: 135 MMOL/L (ref 132–146)
T&S EXPIRATION DATE: NORMAL
WBC NRBC COR # BLD: 3.94 10*3/MM3 (ref 3.5–10.8)

## 2018-11-07 PROCEDURE — 86850 RBC ANTIBODY SCREEN: CPT | Performed by: PHYSICIAN ASSISTANT

## 2018-11-07 PROCEDURE — 85025 COMPLETE CBC W/AUTO DIFF WBC: CPT | Performed by: PHYSICIAN ASSISTANT

## 2018-11-07 PROCEDURE — 86901 BLOOD TYPING SEROLOGIC RH(D): CPT | Performed by: PHYSICIAN ASSISTANT

## 2018-11-07 PROCEDURE — 85610 PROTHROMBIN TIME: CPT | Performed by: PHYSICIAN ASSISTANT

## 2018-11-07 PROCEDURE — 86900 BLOOD TYPING SEROLOGIC ABO: CPT | Performed by: PHYSICIAN ASSISTANT

## 2018-11-07 PROCEDURE — 80076 HEPATIC FUNCTION PANEL: CPT | Performed by: PHYSICIAN ASSISTANT

## 2018-11-07 PROCEDURE — 86923 COMPATIBILITY TEST ELECTRIC: CPT

## 2018-11-07 PROCEDURE — 94010 BREATHING CAPACITY TEST: CPT

## 2018-11-07 PROCEDURE — 71046 X-RAY EXAM CHEST 2 VIEWS: CPT

## 2018-11-07 PROCEDURE — 36415 COLL VENOUS BLD VENIPUNCTURE: CPT

## 2018-11-07 PROCEDURE — 80048 BASIC METABOLIC PNL TOTAL CA: CPT | Performed by: PHYSICIAN ASSISTANT

## 2018-11-07 PROCEDURE — 83036 HEMOGLOBIN GLYCOSYLATED A1C: CPT | Performed by: PHYSICIAN ASSISTANT

## 2018-11-07 PROCEDURE — 94010 BREATHING CAPACITY TEST: CPT | Performed by: INTERNAL MEDICINE

## 2018-11-07 RX ORDER — CHLORHEXIDINE GLUCONATE 500 MG/1
1 CLOTH TOPICAL EVERY 12 HOURS PRN
Status: ACTIVE | OUTPATIENT
Start: 2018-11-07

## 2018-11-07 NOTE — DISCHARGE INSTRUCTIONS
The following information and instructions were given:    NPO after MN except sips of water with routine prescribed medication (except blood thinner, diabetes, or weight reducing medication) unless otherwise instructed by your physician.  Do not eat, drink, smoke or chew gum after midnight the night before surgery. This also includes no mints.    DO NOT shave for two days before your procedure.  Do not wear makeup.      DO NOT wear fingernail polish (gel/regular) and/or acrylic/artificial nails on the day of surgery.   If a patient had recent manicure and would rather not remove polish or artificial nails, then the minimum requirement is that the polish/artificial nails must be removed from the middle finger on each hand.      If patient is having surgery/procedure on an upper extremity, then the patient was instructed that fingernail polish/artificial fingernails must be removed for surgery.  NO EXCEPTIONS.      If patient is having surgery/procedure on a lower extremity, then the patient was instructed that toenail polish on both extremities must be removed for surgery.  NO EXCEPTIONS.    Remove all jewelry (advised to go to jeweler if unable to remove).  Jewelry, especially rings, can no longer be taped for surgery.    Leave anything you consider valuable at home.    Leave your suitcase in the car until after your surgery.    Bring the following with you (if applicable)       -picture ID and insurance cards       -Co-pay/deductible required by insurance       -Medications in the original bottles (not a list) including all over-the-counter  medications if not brought to PAT       -Copy of advance directive, living will or power of  documents if not  brought to [facility]       -CPAP or BIPAP mask and tubing (do not bring machine)       -Skin prep instructions sheet       -PAT Pass    Education booklet, brochure, handout or binder given to patient.    Pain Control After Surgery handout given to  patient.    Respirex use (handout given to patient) and pneumonia prevention.    Signs and Symptoms of infection discussed.    DVT Prevention education given.  Stressing the importance of ambulation.    Patient to apply Chlorhexadine wipes to surgical area (as instructed) the night before procedure and the AM of procedure. WIPES GIVEN.

## 2018-11-08 ENCOUNTER — ANESTHESIA (OUTPATIENT)
Dept: PERIOP | Facility: HOSPITAL | Age: 43
End: 2018-11-08

## 2018-11-08 ENCOUNTER — APPOINTMENT (OUTPATIENT)
Dept: GENERAL RADIOLOGY | Facility: HOSPITAL | Age: 43
End: 2018-11-08

## 2018-11-08 ENCOUNTER — ANESTHESIA EVENT (OUTPATIENT)
Dept: PERIOP | Facility: HOSPITAL | Age: 43
End: 2018-11-08

## 2018-11-08 ENCOUNTER — HOSPITAL ENCOUNTER (INPATIENT)
Facility: HOSPITAL | Age: 43
LOS: 5 days | Discharge: HOME | End: 2018-11-13
Attending: THORACIC SURGERY (CARDIOTHORACIC VASCULAR SURGERY) | Admitting: THORACIC SURGERY (CARDIOTHORACIC VASCULAR SURGERY)

## 2018-11-08 DIAGNOSIS — R91.1 LUNG NODULE: ICD-10-CM

## 2018-11-08 DIAGNOSIS — R91.8 PULMONARY INFILTRATES: ICD-10-CM

## 2018-11-08 DIAGNOSIS — Z74.09 IMPAIRED FUNCTIONAL MOBILITY, BALANCE, GAIT, AND ENDURANCE: Primary | ICD-10-CM

## 2018-11-08 LAB
B-HCG UR QL: NEGATIVE
INTERNAL NEGATIVE CONTROL: NEGATIVE
INTERNAL POSITIVE CONTROL: POSITIVE
Lab: NORMAL

## 2018-11-08 PROCEDURE — 87206 SMEAR FLUORESCENT/ACID STAI: CPT | Performed by: THORACIC SURGERY (CARDIOTHORACIC VASCULAR SURGERY)

## 2018-11-08 PROCEDURE — 87070 CULTURE OTHR SPECIMN AEROBIC: CPT | Performed by: THORACIC SURGERY (CARDIOTHORACIC VASCULAR SURGERY)

## 2018-11-08 PROCEDURE — 88321 CONSLTJ&REPRT SLD PREP ELSWR: CPT

## 2018-11-08 PROCEDURE — 81025 URINE PREGNANCY TEST: CPT | Performed by: THORACIC SURGERY (CARDIOTHORACIC VASCULAR SURGERY)

## 2018-11-08 PROCEDURE — 87116 MYCOBACTERIA CULTURE: CPT | Performed by: THORACIC SURGERY (CARDIOTHORACIC VASCULAR SURGERY)

## 2018-11-08 PROCEDURE — 0BJ08ZZ INSPECTION OF TRACHEOBRONCHIAL TREE, VIA NATURAL OR ARTIFICIAL OPENING ENDOSCOPIC: ICD-10-PCS | Performed by: THORACIC SURGERY (CARDIOTHORACIC VASCULAR SURGERY)

## 2018-11-08 PROCEDURE — 87075 CULTR BACTERIA EXCEPT BLOOD: CPT | Performed by: THORACIC SURGERY (CARDIOTHORACIC VASCULAR SURGERY)

## 2018-11-08 PROCEDURE — 88307 TISSUE EXAM BY PATHOLOGIST: CPT | Performed by: THORACIC SURGERY (CARDIOTHORACIC VASCULAR SURGERY)

## 2018-11-08 PROCEDURE — 87205 SMEAR GRAM STAIN: CPT | Performed by: THORACIC SURGERY (CARDIOTHORACIC VASCULAR SURGERY)

## 2018-11-08 PROCEDURE — 25010000002 CEFUROXIME: Performed by: PHYSICIAN ASSISTANT

## 2018-11-08 PROCEDURE — 25010000002 KETOROLAC TROMETHAMINE PER 15 MG: Performed by: THORACIC SURGERY (CARDIOTHORACIC VASCULAR SURGERY)

## 2018-11-08 PROCEDURE — 25010000002 MIDAZOLAM PER 1 MG: Performed by: ANESTHESIOLOGY

## 2018-11-08 PROCEDURE — 25010000002 HYDROMORPHONE PER 4 MG: Performed by: NURSE ANESTHETIST, CERTIFIED REGISTERED

## 2018-11-08 PROCEDURE — 25010000002 MORPHINE PER 10 MG: Performed by: THORACIC SURGERY (CARDIOTHORACIC VASCULAR SURGERY)

## 2018-11-08 PROCEDURE — 25010000002 FENTANYL CITRATE (PF) 100 MCG/2ML SOLUTION: Performed by: NURSE ANESTHETIST, CERTIFIED REGISTERED

## 2018-11-08 PROCEDURE — 71045 X-RAY EXAM CHEST 1 VIEW: CPT

## 2018-11-08 PROCEDURE — 31622 DX BRONCHOSCOPE/WASH: CPT | Performed by: THORACIC SURGERY (CARDIOTHORACIC VASCULAR SURGERY)

## 2018-11-08 PROCEDURE — 87176 TISSUE HOMOGENIZATION CULTR: CPT | Performed by: THORACIC SURGERY (CARDIOTHORACIC VASCULAR SURGERY)

## 2018-11-08 PROCEDURE — 25010000002 NEOSTIGMINE 10 MG/10ML SOLUTION: Performed by: NURSE ANESTHETIST, CERTIFIED REGISTERED

## 2018-11-08 PROCEDURE — 32608 THORACOSCOPY W/BX NODULE: CPT | Performed by: PHYSICIAN ASSISTANT

## 2018-11-08 PROCEDURE — 88312 SPECIAL STAINS GROUP 1: CPT | Performed by: THORACIC SURGERY (CARDIOTHORACIC VASCULAR SURGERY)

## 2018-11-08 PROCEDURE — 32608 THORACOSCOPY W/BX NODULE: CPT | Performed by: THORACIC SURGERY (CARDIOTHORACIC VASCULAR SURGERY)

## 2018-11-08 PROCEDURE — 0BBG4ZX EXCISION OF LEFT UPPER LUNG LOBE, PERCUTANEOUS ENDOSCOPIC APPROACH, DIAGNOSTIC: ICD-10-PCS | Performed by: THORACIC SURGERY (CARDIOTHORACIC VASCULAR SURGERY)

## 2018-11-08 PROCEDURE — 87102 FUNGUS ISOLATION CULTURE: CPT | Performed by: THORACIC SURGERY (CARDIOTHORACIC VASCULAR SURGERY)

## 2018-11-08 PROCEDURE — 25010000002 DEXAMETHASONE PER 1 MG: Performed by: NURSE ANESTHETIST, CERTIFIED REGISTERED

## 2018-11-08 PROCEDURE — 99232 SBSQ HOSP IP/OBS MODERATE 35: CPT | Performed by: INTERNAL MEDICINE

## 2018-11-08 PROCEDURE — 25010000002 ONDANSETRON PER 1 MG: Performed by: NURSE ANESTHETIST, CERTIFIED REGISTERED

## 2018-11-08 PROCEDURE — 25010000002 PROPOFOL 10 MG/ML EMULSION: Performed by: NURSE ANESTHETIST, CERTIFIED REGISTERED

## 2018-11-08 PROCEDURE — 0BBJ4ZX EXCISION OF LEFT LOWER LUNG LOBE, PERCUTANEOUS ENDOSCOPIC APPROACH, DIAGNOSTIC: ICD-10-PCS | Performed by: THORACIC SURGERY (CARDIOTHORACIC VASCULAR SURGERY)

## 2018-11-08 RX ORDER — PROMETHAZINE HYDROCHLORIDE 25 MG/ML
12.5 INJECTION, SOLUTION INTRAMUSCULAR; INTRAVENOUS ONCE AS NEEDED
Status: DISCONTINUED | OUTPATIENT
Start: 2018-11-08 | End: 2018-11-08 | Stop reason: HOSPADM

## 2018-11-08 RX ORDER — OXYCODONE AND ACETAMINOPHEN 7.5; 325 MG/1; MG/1
1 TABLET ORAL EVERY 6 HOURS PRN
Status: DISCONTINUED | OUTPATIENT
Start: 2018-11-08 | End: 2018-11-13 | Stop reason: HOSPADM

## 2018-11-08 RX ORDER — KETOROLAC TROMETHAMINE 30 MG/ML
30 INJECTION, SOLUTION INTRAMUSCULAR; INTRAVENOUS EVERY 6 HOURS PRN
Status: COMPLETED | OUTPATIENT
Start: 2018-11-08 | End: 2018-11-09

## 2018-11-08 RX ORDER — SODIUM CHLORIDE 0.9 % (FLUSH) 0.9 %
3 SYRINGE (ML) INJECTION EVERY 12 HOURS SCHEDULED
Status: CANCELLED | OUTPATIENT
Start: 2018-11-08

## 2018-11-08 RX ORDER — MORPHINE SULFATE 4 MG/ML
4 INJECTION, SOLUTION INTRAMUSCULAR; INTRAVENOUS
Status: DISCONTINUED | OUTPATIENT
Start: 2018-11-08 | End: 2018-11-11

## 2018-11-08 RX ORDER — FAMOTIDINE 20 MG/1
20 TABLET, FILM COATED ORAL
Status: DISCONTINUED | OUTPATIENT
Start: 2018-11-08 | End: 2018-11-08 | Stop reason: HOSPADM

## 2018-11-08 RX ORDER — ESCITALOPRAM OXALATE 10 MG/1
10 TABLET ORAL DAILY
Status: DISCONTINUED | OUTPATIENT
Start: 2018-11-09 | End: 2018-11-13 | Stop reason: HOSPADM

## 2018-11-08 RX ORDER — ONDANSETRON 4 MG/1
4 TABLET, FILM COATED ORAL EVERY 6 HOURS PRN
Status: DISCONTINUED | OUTPATIENT
Start: 2018-11-08 | End: 2018-11-13 | Stop reason: HOSPADM

## 2018-11-08 RX ORDER — IBUPROFEN 400 MG/1
400 TABLET ORAL EVERY 6 HOURS PRN
Status: DISCONTINUED | OUTPATIENT
Start: 2018-11-08 | End: 2018-11-13 | Stop reason: HOSPADM

## 2018-11-08 RX ORDER — SODIUM CHLORIDE 9 MG/ML
30 INJECTION, SOLUTION INTRAVENOUS CONTINUOUS
Status: DISCONTINUED | OUTPATIENT
Start: 2018-11-08 | End: 2018-11-13 | Stop reason: HOSPADM

## 2018-11-08 RX ORDER — DEXAMETHASONE SODIUM PHOSPHATE 4 MG/ML
INJECTION, SOLUTION INTRA-ARTICULAR; INTRALESIONAL; INTRAMUSCULAR; INTRAVENOUS; SOFT TISSUE AS NEEDED
Status: DISCONTINUED | OUTPATIENT
Start: 2018-11-08 | End: 2018-11-08 | Stop reason: SURG

## 2018-11-08 RX ORDER — LIDOCAINE HYDROCHLORIDE 10 MG/ML
0.5 INJECTION, SOLUTION EPIDURAL; INFILTRATION; INTRACAUDAL; PERINEURAL ONCE AS NEEDED
Status: COMPLETED | OUTPATIENT
Start: 2018-11-08 | End: 2018-11-08

## 2018-11-08 RX ORDER — METOPROLOL SUCCINATE 25 MG/1
25 TABLET, EXTENDED RELEASE ORAL DAILY
Status: DISCONTINUED | OUTPATIENT
Start: 2018-11-09 | End: 2018-11-13 | Stop reason: HOSPADM

## 2018-11-08 RX ORDER — BISACODYL 10 MG
10 SUPPOSITORY, RECTAL RECTAL DAILY PRN
Status: DISCONTINUED | OUTPATIENT
Start: 2018-11-08 | End: 2018-11-13 | Stop reason: HOSPADM

## 2018-11-08 RX ORDER — ATRACURIUM BESYLATE 10 MG/ML
INJECTION, SOLUTION INTRAVENOUS AS NEEDED
Status: DISCONTINUED | OUTPATIENT
Start: 2018-11-08 | End: 2018-11-08 | Stop reason: SURG

## 2018-11-08 RX ORDER — MIDAZOLAM HYDROCHLORIDE 1 MG/ML
2 INJECTION INTRAMUSCULAR; INTRAVENOUS ONCE AS NEEDED
Status: COMPLETED | OUTPATIENT
Start: 2018-11-08 | End: 2018-11-08

## 2018-11-08 RX ORDER — FENTANYL CITRATE 50 UG/ML
50 INJECTION, SOLUTION INTRAMUSCULAR; INTRAVENOUS
Status: DISCONTINUED | OUTPATIENT
Start: 2018-11-08 | End: 2018-11-08 | Stop reason: HOSPADM

## 2018-11-08 RX ORDER — ROCURONIUM BROMIDE 10 MG/ML
INJECTION, SOLUTION INTRAVENOUS AS NEEDED
Status: DISCONTINUED | OUTPATIENT
Start: 2018-11-08 | End: 2018-11-08 | Stop reason: SURG

## 2018-11-08 RX ORDER — GLYCOPYRROLATE 0.2 MG/ML
INJECTION INTRAMUSCULAR; INTRAVENOUS AS NEEDED
Status: DISCONTINUED | OUTPATIENT
Start: 2018-11-08 | End: 2018-11-08 | Stop reason: SURG

## 2018-11-08 RX ORDER — HEPARIN SODIUM 5000 [USP'U]/ML
5000 INJECTION, SOLUTION INTRAVENOUS; SUBCUTANEOUS EVERY 12 HOURS SCHEDULED
Status: DISCONTINUED | OUTPATIENT
Start: 2018-11-10 | End: 2018-11-13 | Stop reason: HOSPADM

## 2018-11-08 RX ORDER — ONDANSETRON 2 MG/ML
INJECTION INTRAMUSCULAR; INTRAVENOUS AS NEEDED
Status: DISCONTINUED | OUTPATIENT
Start: 2018-11-08 | End: 2018-11-08 | Stop reason: SURG

## 2018-11-08 RX ORDER — MORPHINE SULFATE 4 MG/ML
2 INJECTION, SOLUTION INTRAMUSCULAR; INTRAVENOUS
Status: DISCONTINUED | OUTPATIENT
Start: 2018-11-08 | End: 2018-11-11

## 2018-11-08 RX ORDER — NEOSTIGMINE METHYLSULFATE 1 MG/ML
INJECTION, SOLUTION INTRAVENOUS AS NEEDED
Status: DISCONTINUED | OUTPATIENT
Start: 2018-11-08 | End: 2018-11-08 | Stop reason: SURG

## 2018-11-08 RX ORDER — BUPIVACAINE HYDROCHLORIDE AND EPINEPHRINE 5; 5 MG/ML; UG/ML
INJECTION, SOLUTION PERINEURAL AS NEEDED
Status: DISCONTINUED | OUTPATIENT
Start: 2018-11-08 | End: 2018-11-08 | Stop reason: HOSPADM

## 2018-11-08 RX ORDER — ONDANSETRON 2 MG/ML
4 INJECTION INTRAMUSCULAR; INTRAVENOUS EVERY 6 HOURS PRN
Status: DISCONTINUED | OUTPATIENT
Start: 2018-11-08 | End: 2018-11-13 | Stop reason: HOSPADM

## 2018-11-08 RX ORDER — LIDOCAINE HYDROCHLORIDE 10 MG/ML
INJECTION, SOLUTION EPIDURAL; INFILTRATION; INTRACAUDAL; PERINEURAL AS NEEDED
Status: DISCONTINUED | OUTPATIENT
Start: 2018-11-08 | End: 2018-11-08 | Stop reason: SURG

## 2018-11-08 RX ORDER — SODIUM CHLORIDE 0.9 % (FLUSH) 0.9 %
3-10 SYRINGE (ML) INJECTION AS NEEDED
Status: CANCELLED | OUTPATIENT
Start: 2018-11-08

## 2018-11-08 RX ORDER — HYDROMORPHONE HYDROCHLORIDE 1 MG/ML
0.5 INJECTION, SOLUTION INTRAMUSCULAR; INTRAVENOUS; SUBCUTANEOUS
Status: DISCONTINUED | OUTPATIENT
Start: 2018-11-08 | End: 2018-11-08 | Stop reason: HOSPADM

## 2018-11-08 RX ORDER — PROMETHAZINE HYDROCHLORIDE 25 MG/1
25 SUPPOSITORY RECTAL ONCE AS NEEDED
Status: DISCONTINUED | OUTPATIENT
Start: 2018-11-08 | End: 2018-11-08 | Stop reason: HOSPADM

## 2018-11-08 RX ORDER — PROMETHAZINE HYDROCHLORIDE 25 MG/1
25 TABLET ORAL ONCE AS NEEDED
Status: DISCONTINUED | OUTPATIENT
Start: 2018-11-08 | End: 2018-11-08 | Stop reason: HOSPADM

## 2018-11-08 RX ORDER — SODIUM CHLORIDE, SODIUM LACTATE, POTASSIUM CHLORIDE, CALCIUM CHLORIDE 600; 310; 30; 20 MG/100ML; MG/100ML; MG/100ML; MG/100ML
9 INJECTION, SOLUTION INTRAVENOUS CONTINUOUS PRN
Status: DISCONTINUED | OUTPATIENT
Start: 2018-11-08 | End: 2018-11-13 | Stop reason: HOSPADM

## 2018-11-08 RX ORDER — CETIRIZINE HYDROCHLORIDE 10 MG/1
10 TABLET ORAL DAILY
Status: DISCONTINUED | OUTPATIENT
Start: 2018-11-08 | End: 2018-11-13 | Stop reason: HOSPADM

## 2018-11-08 RX ORDER — PROPOFOL 10 MG/ML
VIAL (ML) INTRAVENOUS AS NEEDED
Status: DISCONTINUED | OUTPATIENT
Start: 2018-11-08 | End: 2018-11-08 | Stop reason: SURG

## 2018-11-08 RX ORDER — MAGNESIUM HYDROXIDE 1200 MG/15ML
LIQUID ORAL AS NEEDED
Status: DISCONTINUED | OUTPATIENT
Start: 2018-11-08 | End: 2018-11-08 | Stop reason: HOSPADM

## 2018-11-08 RX ORDER — ONDANSETRON 2 MG/ML
4 INJECTION INTRAMUSCULAR; INTRAVENOUS ONCE AS NEEDED
Status: DISCONTINUED | OUTPATIENT
Start: 2018-11-08 | End: 2018-11-08 | Stop reason: HOSPADM

## 2018-11-08 RX ORDER — FENTANYL CITRATE 50 UG/ML
INJECTION, SOLUTION INTRAMUSCULAR; INTRAVENOUS AS NEEDED
Status: DISCONTINUED | OUTPATIENT
Start: 2018-11-08 | End: 2018-11-08 | Stop reason: SURG

## 2018-11-08 RX ORDER — SODIUM CHLORIDE 9 MG/ML
INJECTION, SOLUTION INTRAVENOUS CONTINUOUS PRN
Status: DISCONTINUED | OUTPATIENT
Start: 2018-11-08 | End: 2018-11-08 | Stop reason: SURG

## 2018-11-08 RX ADMIN — MORPHINE SULFATE 4 MG: 4 INJECTION, SOLUTION INTRAMUSCULAR; INTRAVENOUS at 22:10

## 2018-11-08 RX ADMIN — LIDOCAINE HYDROCHLORIDE 50 MG: 10 INJECTION, SOLUTION EPIDURAL; INFILTRATION; INTRACAUDAL; PERINEURAL at 13:33

## 2018-11-08 RX ADMIN — LIDOCAINE HYDROCHLORIDE 50 MG: 10 INJECTION, SOLUTION EPIDURAL; INFILTRATION; INTRACAUDAL; PERINEURAL at 13:46

## 2018-11-08 RX ADMIN — SODIUM CHLORIDE, POTASSIUM CHLORIDE, SODIUM LACTATE AND CALCIUM CHLORIDE 9 ML/HR: 600; 310; 30; 20 INJECTION, SOLUTION INTRAVENOUS at 11:46

## 2018-11-08 RX ADMIN — MORPHINE SULFATE 4 MG: 4 INJECTION, SOLUTION INTRAMUSCULAR; INTRAVENOUS at 16:23

## 2018-11-08 RX ADMIN — MIDAZOLAM HYDROCHLORIDE 2 MG: 1 INJECTION, SOLUTION INTRAMUSCULAR; INTRAVENOUS at 12:08

## 2018-11-08 RX ADMIN — HYDROMORPHONE HYDROCHLORIDE 0.5 MG: 1 INJECTION, SOLUTION INTRAMUSCULAR; INTRAVENOUS; SUBCUTANEOUS at 15:40

## 2018-11-08 RX ADMIN — FENTANYL CITRATE 50 MCG: 50 INJECTION, SOLUTION INTRAMUSCULAR; INTRAVENOUS at 15:08

## 2018-11-08 RX ADMIN — HYDROMORPHONE HYDROCHLORIDE 0.5 MG: 1 INJECTION, SOLUTION INTRAMUSCULAR; INTRAVENOUS; SUBCUTANEOUS at 15:20

## 2018-11-08 RX ADMIN — LIDOCAINE HYDROCHLORIDE 0.5 ML: 10 INJECTION, SOLUTION EPIDURAL; INFILTRATION; INTRACAUDAL; PERINEURAL at 11:46

## 2018-11-08 RX ADMIN — FENTANYL CITRATE 50 MCG: 50 INJECTION, SOLUTION INTRAMUSCULAR; INTRAVENOUS at 14:15

## 2018-11-08 RX ADMIN — KETOROLAC TROMETHAMINE 30 MG: 30 INJECTION, SOLUTION INTRAMUSCULAR at 18:12

## 2018-11-08 RX ADMIN — MORPHINE SULFATE 4 MG: 4 INJECTION, SOLUTION INTRAMUSCULAR; INTRAVENOUS at 19:27

## 2018-11-08 RX ADMIN — CEFUROXIME 1.5 G: 1.5 INJECTION, POWDER, FOR SOLUTION INTRAVENOUS at 22:00

## 2018-11-08 RX ADMIN — ATRACURIUM BESYLATE 5 MG: 10 INJECTION, SOLUTION INTRAVENOUS at 14:15

## 2018-11-08 RX ADMIN — ROCURONIUM BROMIDE 50 MG: 10 SOLUTION INTRAVENOUS at 13:46

## 2018-11-08 RX ADMIN — SODIUM CHLORIDE 30 ML/HR: 9 INJECTION, SOLUTION INTRAVENOUS at 17:35

## 2018-11-08 RX ADMIN — FENTANYL CITRATE 50 MCG: 50 INJECTION, SOLUTION INTRAMUSCULAR; INTRAVENOUS at 15:40

## 2018-11-08 RX ADMIN — FAMOTIDINE 20 MG: 20 TABLET ORAL at 11:46

## 2018-11-08 RX ADMIN — ONDANSETRON 4 MG: 2 INJECTION INTRAMUSCULAR; INTRAVENOUS at 14:40

## 2018-11-08 RX ADMIN — DEXAMETHASONE SODIUM PHOSPHATE 8 MG: 4 INJECTION, SOLUTION INTRAMUSCULAR; INTRAVENOUS at 13:59

## 2018-11-08 RX ADMIN — CEFUROXIME 1.5 G: 1.5 INJECTION, POWDER, FOR SOLUTION INTRAVENOUS at 13:46

## 2018-11-08 RX ADMIN — OXYCODONE HYDROCHLORIDE AND ACETAMINOPHEN 1 TABLET: 7.5; 325 TABLET ORAL at 23:25

## 2018-11-08 RX ADMIN — OXYCODONE HYDROCHLORIDE AND ACETAMINOPHEN 1 TABLET: 7.5; 325 TABLET ORAL at 17:33

## 2018-11-08 RX ADMIN — FENTANYL CITRATE 100 MCG: 50 INJECTION, SOLUTION INTRAMUSCULAR; INTRAVENOUS at 13:33

## 2018-11-08 RX ADMIN — FENTANYL CITRATE 50 MCG: 50 INJECTION, SOLUTION INTRAMUSCULAR; INTRAVENOUS at 15:25

## 2018-11-08 RX ADMIN — PROPOFOL 200 MG: 10 INJECTION, EMULSION INTRAVENOUS at 13:33

## 2018-11-08 RX ADMIN — SODIUM CHLORIDE: 9 INJECTION, SOLUTION INTRAVENOUS at 13:46

## 2018-11-08 RX ADMIN — FENTANYL CITRATE 50 MCG: 50 INJECTION, SOLUTION INTRAMUSCULAR; INTRAVENOUS at 15:35

## 2018-11-08 RX ADMIN — GLYCOPYRROLATE 0.4 MG: 0.2 INJECTION, SOLUTION INTRAMUSCULAR; INTRAVENOUS at 14:40

## 2018-11-08 RX ADMIN — NEOSTIGMINE METHYLSULFATE 3 MG: 1 INJECTION, SOLUTION INTRAVENOUS at 14:40

## 2018-11-08 RX ADMIN — CETIRIZINE HYDROCHLORIDE 10 MG: 10 TABLET, FILM COATED ORAL at 17:33

## 2018-11-08 RX ADMIN — FENTANYL CITRATE 50 MCG: 50 INJECTION, SOLUTION INTRAMUSCULAR; INTRAVENOUS at 15:20

## 2018-11-08 RX ADMIN — POLYETHYLENE GLYCOL 3350 17 G: 17 POWDER, FOR SOLUTION ORAL at 17:34

## 2018-11-08 RX ADMIN — FENTANYL CITRATE 50 MCG: 50 INJECTION, SOLUTION INTRAMUSCULAR; INTRAVENOUS at 14:00

## 2018-11-08 RX ADMIN — HYDROMORPHONE HYDROCHLORIDE 0.5 MG: 1 INJECTION, SOLUTION INTRAMUSCULAR; INTRAVENOUS; SUBCUTANEOUS at 15:35

## 2018-11-08 NOTE — INTERVAL H&P NOTE
"Pre-Op H&P (See Recent Office Note Attached for Full H&P)    Chief complaint: Lung nodules    Review of Systems:  General ROS:  no fever, chills, rashes, No change since last office visit  Cardiovascular ROS: no chest pain or dyspnea on exertion  Respiratory ROS: no cough, shortness of breath, or wheezing    Meds:    No current facility-administered medications on file prior to encounter.      Current Outpatient Prescriptions on File Prior to Encounter   Medication Sig Dispense Refill   • escitalopram (LEXAPRO) 10 MG tablet Take 10 mg by mouth Daily.     • estradiol (VIVELLE-DOT) 0.025 MG/24HR patch estradiol 0.025 mg/24 hr semiweekly transdermal patch   Apply 1 patch twice a week by transdermal route.     • ibuprofen (ADVIL,MOTRIN) 400 MG tablet Take 400 mg by mouth Every 6 (Six) Hours As Needed for Mild Pain .     • InFLIXimab (REMICADE IV) Infuse 1 Bag into a venous catheter Take As Directed. TWICE MONTHLY     • loratadine (CLARITIN) 10 MG tablet Take 10 mg by mouth Daily As Needed.     • metoprolol succinate XL (TOPROL-XL) 25 MG 24 hr tablet Take 25 mg by mouth Daily.     • progesterone (PROMETRIUM) 100 MG capsule Take 100 mg by mouth Daily.     • oxyCODONE-acetaminophen (PERCOCET) 7.5-325 MG per tablet Take 1 tablet by mouth Every 6 (Six) Hours As Needed for Severe Pain . 12 tablet 0   • predniSONE (DELTASONE) 5 MG tablet Take 5 mg by mouth Daily.         Vital Signs:  BP (!) 152/103 (BP Location: Right arm, Patient Position: Lying)   Pulse 77   Temp 97.9 °F (36.6 °C) (Tympanic)   Resp 18   Ht 162.6 cm (64\")   Wt 93 kg (205 lb)   LMP 09/06/2017   SpO2 96%   BMI 35.19 kg/m²     Physical Exam:    CV:  S1S2 regular rate and rhythm, no murmur               Resp:  Clear to auscultation; respirations regular, even and unlabored    Results Review:    I reviewed the patient's new clinical results.    Cancer Staging (if applicable)  Cancer Patient: __ yes _x_no __unknown; If yes, clinical stage T:__ N:__M:__, " stage group or __N/A    Assessment/Plan:    2-year-old  female with a history of hypertension, tobacco abuse and Crohn's disease who presents with bilateral lung nodules.  Differential in this patient is most likely infection given her immunosuppression.  Metastatic disease is also possible along with sarcoidosis.  Her bronchoscopy with cultures and cytology were negative.  The options included continued observation, navigational bronchoscopy versus bronchoscopy and left VATS lung biopsy.  The left lung lesions are most peripheral.  The risks and benefits of surgery were discussed with the patient including pain, bleeding, infection, respiratory failure requiring continued ventilation and tracheostomy, myocardial infarction and death.  She understood these risks and wished to proceed with surgery.      Jackie Fernandez, APRN  11/8/2018   12:02 PM

## 2018-11-08 NOTE — ANESTHESIA POSTPROCEDURE EVALUATION
Patient: Gloria Smith    Procedure Summary     Date:  11/08/18 [facility] / [facility]    Anesthesia Start:  1334 Anesthesia Stop:      Procedures:       BRONCHOSCOPY (N/A Bronchus)      LEFT THORACOSCOPY VIDEO ASSISTED WITH A LUNG BIOPSY (Left Chest) Diagnosis:       Pulmonary infiltrates      (Pulmonary infiltrates [R91.8])    Surgeon:  Shaun Oates MD Provider:  William Collins MD    Anesthesia Type:  general ASA Status:  3          Anesthesia Type: general  Last vitals  /99   Temp 98   Pulse 74   Resp 18   SpO2 96     Post Anesthesia Care and Evaluation    Patient location during evaluation: PACU  Patient participation: complete - patient participated  Level of consciousness: awake and alert  Pain score: 0  Pain management: adequate  Airway patency: patent  Anesthetic complications: No anesthetic complications  PONV Status: none  Cardiovascular status: hemodynamically stable and acceptable  Respiratory status: nonlabored ventilation, acceptable and nasal cannula  Hydration status: acceptable

## 2018-11-08 NOTE — ANESTHESIA PREPROCEDURE EVALUATION
Anesthesia Evaluation     Patient summary reviewed and Nursing notes reviewed   no history of anesthetic complications:  NPO Solid Status: > 8 hours  NPO Liquid Status: > 8 hours           Airway   Mallampati: II  TM distance: >3 FB  Neck ROM: full  No difficulty expected  Dental - normal exam     Pulmonary - normal exam   (+) shortness of breath,     ROS comment: Nodules  +TB quantiferon gold test  Negative PPD, negative AFB cultures  No isolation required  Cardiovascular - normal exam  Exercise tolerance: good (4-7 METS)    ECG reviewed    (+) hypertension,       Neuro/Psych  (+) psychiatric history Anxiety,     GI/Hepatic/Renal/Endo    (+)  GERD well controlled,  liver disease (fatty liver),     ROS Comment: Crohn's disease    Musculoskeletal     Abdominal    Substance History      OB/GYN          Other   (+) arthritis                     Anesthesia Plan    ASA 3     general   (ANTWAN)  intravenous induction   Anesthetic plan, all risks, benefits, and alternatives have been provided, discussed and informed consent has been obtained with: patient.    Plan discussed with CRNA.

## 2018-11-08 NOTE — H&P (VIEW-ONLY)
10/16/2018  Patient Information  Gloria Smith                                                                                          [address] [facility] [address]   1975  'PCP/Referring Physician'  Shaun Taylor MD  [phone]  No ref. provider found    Chief Complaint   Patient presents with   • Shortness of Breath     Referred by Dr. Christy Herrera for lung nodules. Patient having pain in left chest area       History of Present Illness:  42-year-old  female with a history of hypertension, tobacco abuse and Crohn's disease who presents with lung nodules.  The patient had a normal chest x-ray last June in order to start Remicade for her Crohn's disease.  She has been transitioned from Remicade to Cimzia and a quantiferon gold TB test was positive.  Subsequent imaging has revealed lung nodules and consolidation.  She does note allergies with sinus drainage.  The patient denies cough, hemoptysis, weight loss, lymphadenopathy, fevers or chills.  She does have night sweats.      Patient Active Problem List   Diagnosis   • Crohn disease (CMS/HCC)   • Hypertension   • Anemia, popst-op   • Positive QuantiFERON-TB Gold test   • Pulmonary infiltrates, nodular     Past Medical History:   Diagnosis Date   • Acid reflux    • Anxiety    • Arthritis    • Bowel trouble    • BRCA gene positive    • Constipation    • Crohn's disease (CMS/HCC)    • Diarrhea    • History of transfusion    • Hypertension    • TB (pulmonary tuberculosis)    • TB (tuberculosis) 08/2018   • Wears glasses S     Past Surgical History:   Procedure Laterality Date   • ANKLE SURGERY Left     orif, fracture, later removal hardware   • APPENDECTOMY     • BILATERAL BREAST REDUCTION     • BREAST BIOPSY Left    • BRONCHOSCOPY N/A 9/7/2018    Procedure: BRONCHOSCOPY;  Surgeon: Christy Herrera MD;  Location: [facility] ENDOSCOPY;  Service: Pulmonary   • CHOLECYSTECTOMY     • COLON RESECTION Bilateral 11/6/2017    Procedure:  ILEOCECOTOMY, BILATERAL SALPINGO OOPHERECTOMY;  Surgeon: Ajith Mcduffie MD;  Location: [facility];  Service:    • COLONOSCOPY      5-2017   • ENDOSCOPY     • EXPLORATORY LAPAROTOMY  1997   • OOPHORECTOMY     • TONSILLECTOMY     • WISDOM TOOTH EXTRACTION         Current Outpatient Prescriptions:   •  escitalopram (LEXAPRO) 10 MG tablet, Take 10 mg by mouth Daily., Disp: , Rfl:   •  estradiol (VIVELLE-DOT) 0.025 MG/24HR patch, estradiol 0.025 mg/24 hr semiweekly transdermal patch  Apply 1 patch twice a week by transdermal route., Disp: , Rfl:   •  ibuprofen (ADVIL,MOTRIN) 400 MG tablet, Take 400 mg by mouth Every 6 (Six) Hours As Needed for Mild Pain ., Disp: , Rfl:   •  InFLIXimab (REMICADE IV), Infuse 1 Bag into a venous catheter Take As Directed. TWICE MONTHLY, Disp: , Rfl:   •  loratadine (CLARITIN) 10 MG tablet, Take 10 mg by mouth Daily., Disp: , Rfl:   •  metoprolol succinate XL (TOPROL-XL) 25 MG 24 hr tablet, Take 25 mg by mouth Daily., Disp: , Rfl:   •  progesterone (PROMETRIUM) 100 MG capsule, Take 100 mg by mouth Daily., Disp: , Rfl:   •  oxyCODONE-acetaminophen (PERCOCET) 7.5-325 MG per tablet, Take 1 tablet by mouth Every 6 (Six) Hours As Needed for Severe Pain ., Disp: 12 tablet, Rfl: 0  •  predniSONE (DELTASONE) 5 MG tablet, Take 5 mg by mouth Daily., Disp: , Rfl:   No Known Allergies  Social History     Social History   • Marital status:      Spouse name: N/A   • Number of children: 0   • Years of education: N/A     Occupational History   • dental hygenist      Social History Main Topics   • Smoking status: Former Smoker     Packs/day: 0.25     Years: 27.00     Types: Cigarettes     Quit date: 7/1/2018   • Smokeless tobacco: Never Used   • Alcohol use No      Comment: Socially   • Drug use: No   • Sexual activity: Defer     Other Topics Concern   • Not on file     Social History Narrative    Lives in [place] with spouse, Ajith     Family History   Problem Relation Age of Onset   • Breast  "cancer Mother    • Hypertension Father    • Breast cancer Maternal Grandmother    • Ovarian cancer Maternal Grandmother      Review of Systems   Constitution: Positive for diaphoresis and malaise/fatigue. Negative for chills, fever, night sweats and weight loss.   HENT: Negative for hearing loss, odynophagia and sore throat.    Cardiovascular: Positive for dyspnea on exertion. Negative for chest pain, leg swelling, orthopnea and palpitations.   Respiratory: Positive for shortness of breath and wheezing. Negative for cough and hemoptysis.    Endocrine: Negative for cold intolerance, heat intolerance, polydipsia, polyphagia and polyuria.   Hematologic/Lymphatic: Bruises/bleeds easily.   Skin: Negative for itching and rash.   Musculoskeletal: Positive for arthritis and joint pain. Negative for joint swelling and myalgias.   Gastrointestinal: Negative for abdominal pain, constipation, diarrhea, hematemesis, hematochezia, melena, nausea and vomiting.   Genitourinary: Negative for dysuria, frequency and hematuria.   Neurological: Negative for focal weakness, headaches, light-headedness, loss of balance, numbness and seizures.   Psychiatric/Behavioral: Negative for suicidal ideas.   All other systems reviewed and are negative.    Vitals:    10/16/18 1201   BP: 136/95   BP Location: Right arm   Patient Position: Sitting   Pulse: 92   Temp: 98 °F (36.7 °C)   SpO2: 98%   Weight: 93.7 kg (206 lb 9.6 oz)   Height: 162.6 cm (64\")      Physical Exam   Constitutional: She is oriented to person, place, and time. She appears well-developed and well-nourished. No distress.   HENT:   Head: Normocephalic and atraumatic.   Eyes: Conjunctivae and EOM are normal. No scleral icterus.   Neck: Normal range of motion. Neck supple. No JVD present. No tracheal deviation present.   Cardiovascular: Normal rate, regular rhythm and normal heart sounds.  Exam reveals no gallop and no friction rub.    No murmur heard.  Pulmonary/Chest: Effort normal " and breath sounds normal. No stridor. No respiratory distress. She has no wheezes. She has no rales.   Abdominal: Soft. She exhibits no distension and no mass. There is no tenderness. There is no rebound and no guarding.   Musculoskeletal: Normal range of motion. She exhibits no deformity.   Lymphadenopathy:     She has no cervical adenopathy.     She has no axillary adenopathy.        Right: No supraclavicular adenopathy present.        Left: No supraclavicular adenopathy present.   Neurological: She is alert and oriented to person, place, and time.   Skin: No rash noted. No erythema.   Psychiatric: She has a normal mood and affect. Her behavior is normal. Judgment and thought content normal.       Labs/Imaging:  -Bronchioalveolar lavage collected 9/7/18, negative for malignancy on cytology.  Cultures with normal respiratory luli, AFB and viral cultures negative.  -CT chest performed 9/24/18, personally reviewed, demonstrates multiple bilateral pulmonary nodules.  No pleural effusions  -Echocardiogram performed 10/4/18, personally reviewed, demonstrates EF 60%, no vegetation.           Assessment/Plan:  42-year-old  female with a history of hypertension, tobacco abuse and Crohn's disease who presents with bilateral lung nodules.  Differential in this patient is most likely infection given her immunosuppression.  Metastatic disease is also possible along with sarcoidosis.  Her bronchoscopy with cultures and cytology were negative.  The options included continued observation, navigational bronchoscopy versus bronchoscopy and left VATS lung biopsy.  The left lung lesions are most peripheral.  The risks and benefits of surgery were discussed with the patient including pain, bleeding, infection, respiratory failure requiring continued ventilation and tracheostomy, myocardial infarction and death.  She understood these risks and wished to proceed with surgery.          Patient Active Problem List   Diagnosis    • Crohn disease (CMS/HCC)   • Hypertension   • Anemia, popst-op   • Positive QuantiFERON-TB Gold test   • Pulmonary infiltrates, nodular

## 2018-11-08 NOTE — OP NOTE
DATE OF PROCEDURE: 11/8/2018     PREOPERATIVE DIAGNOSES:  1. Bilateral lung nodules  2. Tobacco abuse  3. Crohn's disease with immunosuppressive medications     POSTOPERATIVE DIAGNOSES:    1. Bilateral lung nodules  2. Tobacco abuse  3. Crohn's disease with immunosuppressive medications     PROCEDURES PERFORMED:    1. Bronchoscopy  2. Left video assisted thoracoscopic surgery  3. Left upper and lower lobe wedge resections  4. Intercostal nerve blocks    SURGEON: Shaun Oates MD       ASSISTANTS:    1. Krystle Saldana PA-C      ANESTHESIA: General endotracheal anesthesia with Jh Alvarez CRNA     ESTIMATED BLOOD LOSS: Less than 50 mL.       INDICATIONS:  42-year-old  female with a history of hypertension, tobacco abuse and Crohn's disease who presented with bilateral lung nodules. She had imaging prior to changing her Crohn's medications to rule out infection.  Bilateral nodules and consolidation were found on CT and a quantiferon gold TB test was positive.  Given her imaging findings, the patient was felt to be a reasonable candidate for a VATS lung biopsy. The risks and benefits of surgery were discussed with the patient including pain, bleeding, infection, air leak, myocardial infarction and death. The patient understood these risks and wished to proceed with surgery.      DESCRIPTION OF PROCEDURE:  The patient was taken to the operating room and placed under general endotracheal anesthesia.  A double-lumen endotracheal tube was placed and position verified with the pediatric bronchoscope.  Examination of the bilateral bronchial tree down to the level of the subsegmental bronchi did not reveal any evidence of endobronchial mass or blood.  There were trivial clear secretions in the left mainstem bronchus secretions.  The patient was placed in the right lateral decubitus position and all 4 extremities were padded and secured to prevent neurologic injury.  She was prepped and draped in the usual sterile  fashion and a timeout was performed including the patient's name, procedure and antibiotic administration.  An incision was made at the seventh intercostal space in the midaxillary line.  Additional incisions were made posteriorly at the seventh intercostal space and anteriorly along the same interspace.  There was yellowish discoloration to the visceral pleura in the areas known to be involved on CT.  Below this yellowish parenchyma, there were obvious nodules.  A wedge biopsy was performed on the left upper lobe along the fissure and a wedge biopsy of the lower lobe in the mid lobe on the lateral surface.  These areas had significant gross findings separate from the normal lung parenchyma.  All involved interspaces were anesthetized using intercostal nerve blocks within the chest under direct vision.  A 28 Senegalese channel drain was placed posterior to the hilum.  This was secured using 0 silk suture.  A 0 Ethibond suture was placed in a mattress fashion for later thoracostomy site closure.  The lung was then inflated under direct vision and found to satisfactorily occupy the chest.  The anterior and posterior incisions were closed with a running 2-0 Vicryl suture followed by 3-0 Vicryl dermal layer and 4-0 Monocryl subcuticular stitch.  Overlying skin glue was applied to these incisions and gauze and tape to the chest tube site.  The patient was returned to the supine position, extubated in the operating room and transported to recovery in stable condition.

## 2018-11-08 NOTE — ANESTHESIA PROCEDURE NOTES
Airway  Urgency: elective    Airway not difficult    General Information and Staff    Patient location during procedure: OR  CRNA: ROBIN BHANDARI    Indications and Patient Condition  Indications for airway management: airway protection    Preoxygenated: yes  MILS not maintained throughout  Mask difficulty assessment: 1 - vent by mask    Final Airway Details  Final airway type: endotracheal airway      Successful airway: ETT - double lumen left  Cuffed: yes   Successful intubation technique: direct laryngoscopy  Endotracheal tube insertion site: oral  Blade: Tran  Blade size: 3  ETT DL size: 35 fr  Cormack-Lehane Classification: grade I - full view of glottis  Placement verified by: chest auscultation and capnometry   Measured from: lips  ETT to lips (cm): 20  Number of attempts at approach: 1    Additional Comments  Negative epigastric sounds, Breath sound equal bilaterally with symmetric chest rise and fall

## 2018-11-08 NOTE — PROGRESS NOTES
Intensive Care Follow-up      LOS: 0 days     Ms. Gloria Smith, 43 y.o. female is followed for: Glycemic, Electrolyte, Respiratory, and Medical management     Subjective - Interval History     43-year-old white female moved to the intensive care unit for monitoring following a video-assisted thoracoscopic lung biopsy for pulmonary nodules.    She currently is awake and alert experiencing moderate pain.  Left-sided chest tube reveals no air leak or active drainage/bleeding    The patient's relevant past medical, surgical and social history were reviewed and updated in Epic as appropriate.     Objective     Infusions:    lactated ringers 9 mL/hr Last Rate: 9 mL/hr (11/08/18 1146)   sodium chloride 30 mL/hr      Medications:    cefuroxime 1.5 g Intravenous Q8H   cetirizine 10 mg Oral Daily   [START ON 11/9/2018] escitalopram 10 mg Oral Daily   [START ON 11/10/2018] heparin (porcine) 5,000 Units Subcutaneous Q12H   [START ON 11/9/2018] metoprolol succinate XL 25 mg Oral Daily   polyethylene glycol 17 g Oral Daily   [START ON 11/9/2018] progesterone 100 mg Oral Daily     Intake/Output       11/08/18 0700 - 11/09/18 0659    Intake (ml) 200    Output (ml) 255    Net (ml) -55    Last Weight  93 kg (205 lb)        Vital Sign Min/Max for last 24 hours  Temp  Min: 97.8 °F (36.6 °C)  Max: 98.2 °F (36.8 °C)   BP  Min: 127/92  Max: 152/103   Pulse  Min: 66  Max: 77   Resp  Min: 16  Max: 20   SpO2  Min: 93 %  Max: 99 %   No Data Recorded        Physical Exam:   GENERAL: Awake, no distress   HEENT: No adenopathy or thyromegaly   LUNGS: Decreased breath sounds on the left compared to the right with a left-sided chest tube.  No air leak   HEART: Regular rate and rhythm without murmurs   ABDOMEN: Soft, obese, nontender   EXTREMITIES: No edema.  Palpable pulses.  No clubbing   NEURO/PSYCH: Awake and alert.  Follows commands.  Intact      Results from last 7 days  Lab Units 11/07/18  1652   WBC 10*3/mm3 3.94   HEMOGLOBIN g/dL 12.7    PLATELETS 10*3/mm3 181       Results from last 7 days  Lab Units 11/07/18  1652   SODIUM mmol/L 135   POTASSIUM mmol/L 3.9   CO2 mmol/L 25.0   BUN mg/dL 11   CREATININE mg/dL 0.54*   GLUCOSE mg/dL 86     Estimated Creatinine Clearance: 145.4 mL/min (A) (by C-G formula based on SCr of 0.54 mg/dL (L)).      Results from last 7 days  Lab Units 11/07/18  1652   HEMOGLOBIN A1C % 5.00           No results found for: LACTATE       Images: Chest x-ray reveals postoperative changes with low lung volume on the left and chest tubes in place without pneumothorax    I reviewed the patient's results and images.     Impression      Active Hospital Problems    Diagnosis   • **Pulmonary infiltrates, nodular   • Lung nodule         Plan        Pain control  Incentive spirometry  Follow up on final pathology    I discussed the patient's findings and my recommendations with patient, family and nursing staff       NIKHIL Gore MD  Pulmonary and Critical Care Medicine

## 2018-11-09 ENCOUNTER — APPOINTMENT (OUTPATIENT)
Dept: GENERAL RADIOLOGY | Facility: HOSPITAL | Age: 43
End: 2018-11-09

## 2018-11-09 LAB
ANION GAP SERPL CALCULATED.3IONS-SCNC: 9 MMOL/L (ref 3–11)
BASOPHILS # BLD AUTO: 0 10*3/MM3 (ref 0–0.2)
BASOPHILS NFR BLD AUTO: 0 % (ref 0–1)
BUN BLD-MCNC: 6 MG/DL (ref 9–23)
BUN/CREAT SERPL: 12.8 (ref 7–25)
CALCIUM SPEC-SCNC: 8.8 MG/DL (ref 8.7–10.4)
CHLORIDE SERPL-SCNC: 98 MMOL/L (ref 99–109)
CO2 SERPL-SCNC: 25 MMOL/L (ref 20–31)
CREAT BLD-MCNC: 0.47 MG/DL (ref 0.6–1.3)
DEPRECATED RDW RBC AUTO: 44.2 FL (ref 37–54)
EOSINOPHIL # BLD AUTO: 0 10*3/MM3 (ref 0–0.3)
EOSINOPHIL NFR BLD AUTO: 0 % (ref 0–3)
ERYTHROCYTE [DISTWIDTH] IN BLOOD BY AUTOMATED COUNT: 12.7 % (ref 11.3–14.5)
GFR SERPL CREATININE-BSD FRML MDRD: 143 ML/MIN/1.73
GLUCOSE BLD-MCNC: 138 MG/DL (ref 70–100)
HCT VFR BLD AUTO: 36.7 % (ref 34.5–44)
HGB BLD-MCNC: 12 G/DL (ref 11.5–15.5)
IMM GRANULOCYTES # BLD: 0 10*3/MM3 (ref 0–0.03)
IMM GRANULOCYTES NFR BLD: 0 % (ref 0–0.6)
LYMPHOCYTES # BLD AUTO: 0.49 10*3/MM3 (ref 0.6–4.8)
LYMPHOCYTES NFR BLD AUTO: 8.3 % (ref 24–44)
MCH RBC QN AUTO: 31.7 PG (ref 27–31)
MCHC RBC AUTO-ENTMCNC: 32.7 G/DL (ref 32–36)
MCV RBC AUTO: 97.1 FL (ref 80–99)
MONOCYTES # BLD AUTO: 0.35 10*3/MM3 (ref 0–1)
MONOCYTES NFR BLD AUTO: 5.9 % (ref 0–12)
NEUTROPHILS # BLD AUTO: 5.09 10*3/MM3 (ref 1.5–8.3)
NEUTROPHILS NFR BLD AUTO: 85.8 % (ref 41–71)
PLATELET # BLD AUTO: 190 10*3/MM3 (ref 150–450)
PMV BLD AUTO: 10.6 FL (ref 6–12)
POTASSIUM BLD-SCNC: 4.3 MMOL/L (ref 3.5–5.5)
RBC # BLD AUTO: 3.78 10*6/MM3 (ref 3.89–5.14)
SODIUM BLD-SCNC: 132 MMOL/L (ref 132–146)
WBC NRBC COR # BLD: 5.93 10*3/MM3 (ref 3.5–10.8)

## 2018-11-09 PROCEDURE — 85025 COMPLETE CBC W/AUTO DIFF WBC: CPT | Performed by: PHYSICIAN ASSISTANT

## 2018-11-09 PROCEDURE — 80048 BASIC METABOLIC PNL TOTAL CA: CPT | Performed by: PHYSICIAN ASSISTANT

## 2018-11-09 PROCEDURE — 25010000002 CEFUROXIME: Performed by: PHYSICIAN ASSISTANT

## 2018-11-09 PROCEDURE — 25010000002 KETOROLAC TROMETHAMINE PER 15 MG: Performed by: THORACIC SURGERY (CARDIOTHORACIC VASCULAR SURGERY)

## 2018-11-09 PROCEDURE — 25010000002 MORPHINE PER 10 MG: Performed by: THORACIC SURGERY (CARDIOTHORACIC VASCULAR SURGERY)

## 2018-11-09 PROCEDURE — 99232 SBSQ HOSP IP/OBS MODERATE 35: CPT | Performed by: INTERNAL MEDICINE

## 2018-11-09 PROCEDURE — 71045 X-RAY EXAM CHEST 1 VIEW: CPT

## 2018-11-09 PROCEDURE — 99024 POSTOP FOLLOW-UP VISIT: CPT | Performed by: PHYSICIAN ASSISTANT

## 2018-11-09 RX ADMIN — OXYCODONE HYDROCHLORIDE AND ACETAMINOPHEN 1 TABLET: 7.5; 325 TABLET ORAL at 18:12

## 2018-11-09 RX ADMIN — KETOROLAC TROMETHAMINE 30 MG: 30 INJECTION, SOLUTION INTRAMUSCULAR at 10:22

## 2018-11-09 RX ADMIN — PROGESTERONE 100 MG: 100 CAPSULE ORAL at 08:13

## 2018-11-09 RX ADMIN — CETIRIZINE HYDROCHLORIDE 10 MG: 10 TABLET, FILM COATED ORAL at 08:13

## 2018-11-09 RX ADMIN — MORPHINE SULFATE 4 MG: 4 INJECTION, SOLUTION INTRAMUSCULAR; INTRAVENOUS at 01:54

## 2018-11-09 RX ADMIN — MORPHINE SULFATE 4 MG: 4 INJECTION, SOLUTION INTRAMUSCULAR; INTRAVENOUS at 16:05

## 2018-11-09 RX ADMIN — CEFUROXIME 1.5 G: 1.5 INJECTION, POWDER, FOR SOLUTION INTRAVENOUS at 05:32

## 2018-11-09 RX ADMIN — MORPHINE SULFATE 4 MG: 4 INJECTION, SOLUTION INTRAMUSCULAR; INTRAVENOUS at 21:00

## 2018-11-09 RX ADMIN — IBUPROFEN 400 MG: 400 TABLET ORAL at 17:24

## 2018-11-09 RX ADMIN — OXYCODONE HYDROCHLORIDE AND ACETAMINOPHEN 1 TABLET: 7.5; 325 TABLET ORAL at 05:32

## 2018-11-09 RX ADMIN — IBUPROFEN 400 MG: 400 TABLET ORAL at 23:04

## 2018-11-09 RX ADMIN — OXYCODONE HYDROCHLORIDE AND ACETAMINOPHEN 1 TABLET: 7.5; 325 TABLET ORAL at 12:43

## 2018-11-09 RX ADMIN — ESCITALOPRAM OXALATE 10 MG: 10 TABLET ORAL at 08:13

## 2018-11-09 RX ADMIN — KETOROLAC TROMETHAMINE 30 MG: 30 INJECTION, SOLUTION INTRAMUSCULAR at 04:03

## 2018-11-09 RX ADMIN — MORPHINE SULFATE 4 MG: 4 INJECTION, SOLUTION INTRAMUSCULAR; INTRAVENOUS at 08:14

## 2018-11-09 RX ADMIN — POLYETHYLENE GLYCOL 3350 17 G: 17 POWDER, FOR SOLUTION ORAL at 08:14

## 2018-11-09 RX ADMIN — METOPROLOL SUCCINATE 25 MG: 25 TABLET, EXTENDED RELEASE ORAL at 08:13

## 2018-11-09 RX ADMIN — OXYCODONE HYDROCHLORIDE AND ACETAMINOPHEN 1 TABLET: 7.5; 325 TABLET ORAL at 23:04

## 2018-11-09 NOTE — PLAN OF CARE
Problem: Patient Care Overview  Goal: Plan of Care Review  Outcome: Ongoing (interventions implemented as appropriate)   11/09/18 0459   Coping/Psychosocial   Plan of Care Reviewed With patient   OTHER   Outcome Summary L vat biopsy yesterday, pain difficult to control but otherwise stable, vss, on 2Lnc, CT intact to 20suction, adequate urine. anticipate dc banks.        Problem: Skin Injury Risk (Adult)  Goal: Skin Health and Integrity  Outcome: Ongoing (interventions implemented as appropriate)   11/09/18 5839   Skin Injury Risk (Adult)   Skin Health and Integrity making progress toward outcome

## 2018-11-09 NOTE — PLAN OF CARE
Problem: Skin Injury Risk (Adult)  Goal: Identify Related Risk Factors and Signs and Symptoms  Outcome: Ongoing (interventions implemented as appropriate)   11/09/18 1654   Skin Injury Risk (Adult)   Related Risk Factors (Skin Injury Risk) critical care admission

## 2018-11-09 NOTE — PLAN OF CARE
Problem: Patient Care Overview  Goal: Plan of Care Review  Outcome: Ongoing (interventions implemented as appropriate)   11/09/18 2579   Coping/Psychosocial   Plan of Care Reviewed With patient   OTHER   Outcome Summary banks DC'd, frequent pain medication, ambulated x1, up in chair most of the day. minimal CT output. VSS. will continue to monitor.    Plan of Care Review   Progress improving

## 2018-11-09 NOTE — PROGRESS NOTES
Multidisciplinary Rounds    Time: 20min  Patient Name: Gloria Smith  Date of Encounter: 11/09/18 9:06 AM  MRN: [id number]  Admission date: 11/8/2018      Reason for visit: MDR. RD to continue to follow per protocol.     Additional information obtained during MDR: Pt s/p L VATS, KOBE wedge resection (11/8).     Current diet: Diet Regular; Cardiac      Intervention:  Follow treatment plan  Care plan reviewed    Follow up:   Per protocol      Marialuisa Vera MS RD/LENY CNSC  9:06 AM

## 2018-11-09 NOTE — PLAN OF CARE
Problem: Patient Care Overview  Goal: Discharge Needs Assessment  Outcome: Ongoing (interventions implemented as appropriate)   11/08/18 2051 11/09/18 0956   Discharge Needs Assessment   Readmission Within the Last 30 Days --  no previous admission in last 30 days   Concerns to be Addressed --  no discharge needs identified   Patient/Family Anticipates Transition to --  home with family   Patient/Family Anticipated Services at Transition --  none   Transportation Concerns car, none --    Transportation Anticipated --  family or friend will provide   Anticipated Changes Related to Illness --  none   Equipment Needed After Discharge --  none   Disability   Equipment Currently Used at Home --  none

## 2018-11-09 NOTE — PROGRESS NOTES
Discharge Planning Assessment  [facility]     Patient Name: Gloria Smith  MRN: [id number]  Today's Date: 11/9/2018    Admit Date: 11/8/2018          Discharge Needs Assessment     Row Name 11/09/18 0956       Living Environment    Lives With spouse    Name(s) of Who Lives With Patient Ajith spouse    Current Living Arrangements home/apartment/condo    Primary Care Provided by self    Provides Primary Care For no one    Family Caregiver if Needed spouse    Family Caregiver Names Ajith spouse    Quality of Family Relationships involved;helpful;supportive    Able to Return to Prior Arrangements yes    Living Arrangement Comments Pt lives with spouse in 1 level home in [facility].       Resource/Environmental Concerns    Resource/Environmental Concerns none       Transition Planning    Patient/Family Anticipates Transition to home with family    Patient/Family Anticipated Services at Transition none    Transportation Anticipated family or friend will provide       Discharge Needs Assessment    Readmission Within the Last 30 Days no previous admission in last 30 days    Concerns to be Addressed no discharge needs identified    Equipment Currently Used at Home none    Anticipated Changes Related to Illness none    Equipment Needed After Discharge none            Discharge Plan     Row Name 11/09/18 0957       Plan    Plan Home with spouse    Patient/Family in Agreement with Plan yes    Plan Comments Met with patient in room. Pt was independent with ADL's prior to this admission. Pt has had home infusion in the past. She does see Dr Taylor PCP. She has insurance to cover her medication. Plan is home with family at discharge.    Final Discharge Disposition Code 01 - home or self-care        Destination     No service coordination in this encounter.      Durable Medical Equipment     No service coordination in this encounter.      Dialysis/Infusion     No service coordination in this encounter.      Home Medical  Care     No service coordination in this encounter.      Social Care     No service coordination in this encounter.        Expected Discharge Date and Time     Expected Discharge Date Expected Discharge Time    Nov 13, 2018               Demographic Summary     Row Name 11/09/18 0955       General Information    Admission Type inpatient    Arrived From home    Referral Source admission list    Reason for Consult discharge planning    Preferred Language English       Contact Information    Permission Granted to Share Info With             Functional Status     Row Name 11/09/18 0955       Functional Status    Usual Activity Tolerance excellent       Functional Status, IADL    Medications independent    Meal Preparation independent    Housekeeping independent    Laundry independent    Shopping independent            Psychosocial    No documentation.           Abuse/Neglect    No documentation.           Legal    No documentation.           Substance Abuse    No documentation.           Patient Forms    No documentation.         Christy Starkey RN

## 2018-11-09 NOTE — PROGRESS NOTES
Intensive Care Follow-up      LOS: 1 day     Ms. Gloria Smith, 43 y.o. female is followed for: Glycemic, Electrolyte, Respiratory, and Medical management     Subjective - Interval History     Awake and alert  No problems overnight  Pain better controlled  No air leak from chest tube    The patient's relevant past medical, surgical and social history were reviewed and updated in Epic as appropriate.     Objective     Infusions:    lactated ringers 9 mL/hr Last Rate: 9 mL/hr (11/08/18 1146)   sodium chloride 30 mL/hr Last Rate: 30 mL/hr (11/08/18 1735)     Medications:    cetirizine 10 mg Oral Daily   escitalopram 10 mg Oral Daily   [START ON 11/10/2018] heparin (porcine) 5,000 Units Subcutaneous Q12H   metoprolol succinate XL 25 mg Oral Daily   polyethylene glycol 17 g Oral Daily   progesterone 100 mg Oral Daily     Intake/Output       11/08/18 0700 - 11/09/18 0659 11/09/18 0700 - 11/10/18 0659    Intake (ml) 1651.7 --    Output (ml) 3379 900    Net (ml) -1727.3 -900    Last Weight  100 kg (221 lb 5.5 oz)  --        Vital Sign Min/Max for last 24 hours  Temp  Min: 97.7 °F (36.5 °C)  Max: 98.4 °F (36.9 °C)   BP  Min: 104/70  Max: 151/100   Pulse  Min: 66  Max: 122   Resp  Min: 16  Max: 20   SpO2  Min: 92 %  Max: 99 %   No Data Recorded        Physical Exam:   GENERAL: Awake, no distress   HEENT: No adenopathy or thyromegaly   LUNGS: Decreased breath sounds left greater than right without wheezes.  Chest tube without air leak or active drainage   HEART: Regular rate and rhythm without murmurs   ABDOMEN: Soft, nontender   EXTREMITIES: Palpable pulses.  No cyanosis or edema   NEURO/PSYCH: Awake and alert.  Follows commands.  No deficits      Results from last 7 days  Lab Units 11/09/18  0440 11/07/18  1652   WBC 10*3/mm3 5.93 3.94   HEMOGLOBIN g/dL 12.0 12.7   PLATELETS 10*3/mm3 190 181       Results from last 7 days  Lab Units 11/09/18  0440 11/07/18  1652   SODIUM mmol/L 132 135   POTASSIUM mmol/L 4.3 3.9   CO2  mmol/L 25.0 25.0   BUN mg/dL 6* 11   CREATININE mg/dL 0.47* 0.54*   GLUCOSE mg/dL 138* 86     Estimated Creatinine Clearance: 173.7 mL/min (A) (by C-G formula based on SCr of 0.47 mg/dL (L)).      Results from last 7 days  Lab Units 11/07/18  1652   HEMOGLOBIN A1C % 5.00           No results found for: LACTATE       Images: Chest x-ray today reveals patchy bilateral pulmonary infiltrates left greater than right with good chest tube positioning on the left    I reviewed the patient's results and images.     Impression      Active Hospital Problems    Diagnosis   • **Pulmonary infiltrates, nodular   • Lung nodule         Plan        Continue to follow-up electrolytes and blood sugars and address as necessary  Mobilization  Follow-up on final pathology results    Plan of care and goals reviewed with mulitdisciplinary team at daily rounds   I discussed the patient's findings and my recommendations with patient and nursing staff       NIKHIL Gore MD  Pulmonary and Critical Care Medicine

## 2018-11-10 PROCEDURE — 87385 HISTOPLASMA CAPSUL AG IA: CPT | Performed by: INTERNAL MEDICINE

## 2018-11-10 PROCEDURE — 99233 SBSQ HOSP IP/OBS HIGH 50: CPT | Performed by: INTERNAL MEDICINE

## 2018-11-10 PROCEDURE — 25010000002 HEPARIN (PORCINE) PER 1000 UNITS: Performed by: PHYSICIAN ASSISTANT

## 2018-11-10 PROCEDURE — 94799 UNLISTED PULMONARY SVC/PX: CPT

## 2018-11-10 PROCEDURE — 25010000002 MORPHINE PER 10 MG: Performed by: THORACIC SURGERY (CARDIOTHORACIC VASCULAR SURGERY)

## 2018-11-10 PROCEDURE — 87449 NOS EACH ORGANISM AG IA: CPT | Performed by: INTERNAL MEDICINE

## 2018-11-10 RX ADMIN — OXYCODONE HYDROCHLORIDE AND ACETAMINOPHEN 1 TABLET: 7.5; 325 TABLET ORAL at 17:17

## 2018-11-10 RX ADMIN — POLYETHYLENE GLYCOL 3350 17 G: 17 POWDER, FOR SOLUTION ORAL at 08:45

## 2018-11-10 RX ADMIN — MORPHINE SULFATE 4 MG: 4 INJECTION, SOLUTION INTRAMUSCULAR; INTRAVENOUS at 20:15

## 2018-11-10 RX ADMIN — OXYCODONE HYDROCHLORIDE AND ACETAMINOPHEN 1 TABLET: 7.5; 325 TABLET ORAL at 11:44

## 2018-11-10 RX ADMIN — ESCITALOPRAM OXALATE 10 MG: 10 TABLET ORAL at 08:45

## 2018-11-10 RX ADMIN — OXYCODONE HYDROCHLORIDE AND ACETAMINOPHEN 1 TABLET: 7.5; 325 TABLET ORAL at 05:01

## 2018-11-10 RX ADMIN — MORPHINE SULFATE 2 MG: 4 INJECTION, SOLUTION INTRAMUSCULAR; INTRAVENOUS at 08:39

## 2018-11-10 RX ADMIN — MORPHINE SULFATE 4 MG: 4 INJECTION, SOLUTION INTRAMUSCULAR; INTRAVENOUS at 02:00

## 2018-11-10 RX ADMIN — CETIRIZINE HYDROCHLORIDE 10 MG: 10 TABLET, FILM COATED ORAL at 08:45

## 2018-11-10 RX ADMIN — HEPARIN SODIUM 5000 UNITS: 5000 INJECTION, SOLUTION INTRAVENOUS; SUBCUTANEOUS at 08:45

## 2018-11-10 RX ADMIN — IBUPROFEN 400 MG: 400 TABLET ORAL at 05:01

## 2018-11-10 RX ADMIN — METOPROLOL SUCCINATE 25 MG: 25 TABLET, EXTENDED RELEASE ORAL at 08:45

## 2018-11-10 RX ADMIN — OXYCODONE HYDROCHLORIDE AND ACETAMINOPHEN 1 TABLET: 7.5; 325 TABLET ORAL at 23:07

## 2018-11-10 RX ADMIN — IBUPROFEN 400 MG: 400 TABLET ORAL at 11:44

## 2018-11-10 RX ADMIN — MORPHINE SULFATE 2 MG: 4 INJECTION, SOLUTION INTRAMUSCULAR; INTRAVENOUS at 14:17

## 2018-11-10 RX ADMIN — IBUPROFEN 400 MG: 400 TABLET ORAL at 23:07

## 2018-11-10 RX ADMIN — HEPARIN SODIUM 5000 UNITS: 5000 INJECTION, SOLUTION INTRAVENOUS; SUBCUTANEOUS at 20:15

## 2018-11-10 RX ADMIN — PROGESTERONE 100 MG: 100 CAPSULE ORAL at 08:45

## 2018-11-10 NOTE — PLAN OF CARE
Problem: Patient Care Overview  Goal: Plan of Care Review  Outcome: Ongoing (interventions implemented as appropriate)   11/10/18 0346   Coping/Psychosocial   Plan of Care Reviewed With patient;spouse   OTHER   Outcome Summary pt requiring frequent pain medications both IV and PO. voiding spontaneously without difficulty. vital signs stable.   Plan of Care Review   Progress improving       Problem: Skin Injury Risk (Adult)  Goal: Skin Health and Integrity  Outcome: Ongoing (interventions implemented as appropriate)   11/10/18 0346   Skin Injury Risk (Adult)   Skin Health and Integrity achieves outcome       Problem: Lung Surgery (via Thoracotomy) (Adult)  Goal: Signs and Symptoms of Listed Potential Problems Will be Absent, Minimized or Managed (Lung Surgery)   11/10/18 0346   Goal/Outcome Evaluation   Problems Assessed (Lung Surgery/Thoracotomy) all   Problems Present (Lung Surgery) bowel motility decreased;pain       Problem: Pain, Acute (Adult)  Goal: Identify Related Risk Factors and Signs and Symptoms   11/10/18 0346   Pain, Acute (Adult)   Related Risk Factors (Acute Pain) patient perception;persistent pain;positioning;procedure/treatment;psychosocial factor;surgery   Signs and Symptoms (Acute Pain) facial mask of pain/grimace;guarding/abnormal posturing/positioning;moaning;sleep pattern alteration;verbalization of pain descriptors     Goal: Acceptable Pain Control/Comfort Level  Outcome: Ongoing (interventions implemented as appropriate)   11/10/18 0346   Pain, Acute (Adult)   Acceptable Pain Control/Comfort Level making progress toward outcome

## 2018-11-10 NOTE — PROGRESS NOTES
Gloria Smith  [id number]  1975    POD# 2 S/P left VATS and wedge resection    CC: This tube in my side is killing me.    Subjective:  43-year-old  female now 2 days postoperative shin of a left lung pulmonary nodule.  Final pathology is pending.  Hemodynamics are stable vital signs are stable.  Pain control has been a major issue.      Pulmonary infiltrates, nodular (S/P Surgical lung biopsy)    Crohn disease (CMS/HCC)    Positive QuantiFERON-TB Gold test      Objective:    Vital Signs:  Temp:  [97.2 °F (36.2 °C)-98.7 °F (37.1 °C)] 98.3 °F (36.8 °C)  Heart Rate:  [] 96  Resp:  [16-22] 18  BP: ()/() 123/80    Physical Exam:   General Appearance: alert, appears stated age    Lungs: clear to auscultation    Heart: regular rhythm & normal rate, normal S1, S2,no murmur    Skin:warm and dry        Results from last 7 days   Lab Units  11/09/18   0440   WBC 10*3/mm3  5.93   HEMOGLOBIN g/dL  12.0   HEMATOCRIT %  36.7   PLATELETS 10*3/mm3  190     Results from last 7 days   Lab Units  11/09/18   0440   SODIUM mmol/L  132   POTASSIUM mmol/L  4.3   CHLORIDE mmol/L  98*   CO2 mmol/L  25.0   BUN mg/dL  6*   CREATININE mg/dL  0.47*   GLUCOSE mg/dL  138*   CALCIUM mg/dL  8.8       Imaging Results (last 24 hours)     Procedure Component Value Units Date/Time    XR Chest 1 View [[id number]] Collected:  11/09/18 1004     Updated:  11/09/18 1634    Narrative:       EXAMINATION: XR CHEST 1 VW- 11/09/2018     INDICATION: postop; R91.1-Solitary pulmonary nodule; R91.8-Other  nonspecific abnormal finding of lung field      COMPARISON: 11/08/2018     FINDINGS: Portable chest reveals a chest tube identified on the left.  Patchy ill-defined opacification seen within the left mid and right  lower lung field. No definite pneumothorax. Degenerative changes seen  within the spine. The heart is borderline enlarged. Lung volumes are  low.           Impression:       Chest as above. Left chest tube in place with  no definite  pneumothorax.     D:  11/09/2018  E:  11/09/2018     This report was finalized on 11/9/2018 4:32 PM by Dr. Josefina Quinones MD.              Diagnosis: Pneumonitis with granuloma formation. Stable course  post wedge resection.        Plan: Continue current Rx regimen in SICU.   I have reviewed, verified, and confirmed the above history and current status.  I have examined the patient and confirmed the above physical findings.     Mark Walker MD  11/10/18  2:43 PM

## 2018-11-10 NOTE — PLAN OF CARE
Problem: Patient Care Overview  Goal: Plan of Care Review  Outcome: Ongoing (interventions implemented as appropriate)   11/10/18 5456   Coping/Psychosocial   Plan of Care Reviewed With patient   OTHER   Outcome Summary Pt still requiring frequent pain medication and stating consistent 10/10 on pain number scale. Chest tube output over 400ml today, but as shift progresses output is decreasing (20-30ml/q2h), VSS will continue to monitr.   Plan of Care Review   Progress improving

## 2018-11-10 NOTE — PROGRESS NOTES
Intensive Care Follow-up      LOS: 2 days     Ms. Gloria Smith, 43 y.o. female is followed for: Glycemic, Electrolyte, Respiratory, and Medical management     Subjective - Interval History     No problems overnight  Pain remains moderate  No air leak from chest tube    The patient's relevant past medical, surgical and social history were reviewed and updated in Epic as appropriate.     Objective     Infusions:    lactated ringers 9 mL/hr Last Rate: 9 mL/hr (11/08/18 1146)   sodium chloride 30 mL/hr Last Rate: 30 mL/hr (11/08/18 1735)     Medications:    cetirizine 10 mg Oral Daily   escitalopram 10 mg Oral Daily   heparin (porcine) 5,000 Units Subcutaneous Q12H   metoprolol succinate XL 25 mg Oral Daily   polyethylene glycol 17 g Oral Daily   progesterone 100 mg Oral Daily     Intake/Output       11/09/18 0700 - 11/10/18 0659 11/10/18 0700 - 11/11/18 0659    Intake (ml) 604.2 --    Output (ml) 3805 90    Net (ml) -3200.8 -90        Vital Sign Min/Max for last 24 hours  Temp  Min: 97.2 °F (36.2 °C)  Max: 98.7 °F (37.1 °C)   BP  Min: 95/68  Max: 140/101   Pulse  Min: 79  Max: 111   Resp  Min: 16  Max: 22   SpO2  Min: 86 %  Max: 97 %   No Data Recorded        Physical Exam:   GENERAL: Awake, no distress   HEENT: No adenopathy or thyromegaly   LUNGS: Decreased breath sounds in the left compared to the right without wheezes.  No air leak from chest tube   HEART: Regular rate and rhythm without murmurs   ABDOMEN: Soft, nontender   EXTREMITIES: Palpable pulses.  No clubbing or cyanosis   NEURO/PSYCH: Awake and alert.  Follows commands.  Grossly intact    Results from last 7 days   Lab Units  11/09/18   0440  11/07/18   1652   WBC 10*3/mm3  5.93  3.94   HEMOGLOBIN g/dL  12.0  12.7   PLATELETS 10*3/mm3  190  181     Results from last 7 days   Lab Units  11/09/18   0440  11/07/18   1652   SODIUM mmol/L  132  135   POTASSIUM mmol/L  4.3  3.9   CO2 mmol/L  25.0  25.0   BUN mg/dL  6*  11   CREATININE mg/dL  0.47*  0.54*    GLUCOSE mg/dL  138*  86     Estimated Creatinine Clearance: 173.7 mL/min (A) (by C-G formula based on SCr of 0.47 mg/dL (L)).    Results from last 7 days   Lab Units  11/07/18   1652   HEMOGLOBIN A1C %  5.00           No results found for: LACTATE       Images: Chest x-ray reveals postoperative changes on the left.  Patchy bilateral infiltrate/atelectasis.  No pneumothorax    I reviewed the patient's results and images.     Impression      Active Hospital Problems    Diagnosis   • **Pulmonary infiltrates, nodular (S/P Surgical lung biopsy)   • Positive QuantiFERON-TB Gold test   • Crohn disease (CMS/[facility])         Plan        Pathology reviewed.  Pneumonitis with poorly formed granulomas suggestive of a prior or active granulomatous inflammatory process.  Prior cultures and fungal serologies were negative.  Will repeat some of the fungal serologies, however.  Also ANCA as well although no evidence of vasculitis.     Plan of care and goals reviewed with mulitdisciplinary team at daily rounds   I discussed the patient's findings and my recommendations with patient and nursing staff       NIKHIL Gore MD  Pulmonary and Critical Care Medicine

## 2018-11-11 ENCOUNTER — APPOINTMENT (OUTPATIENT)
Dept: GENERAL RADIOLOGY | Facility: HOSPITAL | Age: 43
End: 2018-11-11

## 2018-11-11 LAB
ABO + RH BLD: NORMAL
ABO + RH BLD: NORMAL
ALBUMIN SERPL-MCNC: 3.35 G/DL (ref 3.2–4.8)
ALBUMIN/GLOB SERPL: 1.3 G/DL (ref 1.5–2.5)
ALP SERPL-CCNC: 45 U/L (ref 25–100)
ALT SERPL W P-5'-P-CCNC: 41 U/L (ref 7–40)
ANION GAP SERPL CALCULATED.3IONS-SCNC: 5 MMOL/L (ref 3–11)
AST SERPL-CCNC: 26 U/L (ref 0–33)
BH BB BLOOD EXPIRATION DATE: NORMAL
BH BB BLOOD EXPIRATION DATE: NORMAL
BH BB BLOOD TYPE BARCODE: 6200
BH BB BLOOD TYPE BARCODE: 6200
BH BB DISPENSE STATUS: NORMAL
BH BB DISPENSE STATUS: NORMAL
BH BB PRODUCT CODE: NORMAL
BH BB PRODUCT CODE: NORMAL
BH BB UNIT NUMBER: NORMAL
BH BB UNIT NUMBER: NORMAL
BILIRUB SERPL-MCNC: 0.6 MG/DL (ref 0.3–1.2)
BUN BLD-MCNC: 6 MG/DL (ref 9–23)
BUN/CREAT SERPL: 14.6 (ref 7–25)
CALCIUM SPEC-SCNC: 8.5 MG/DL (ref 8.7–10.4)
CHLORIDE SERPL-SCNC: 102 MMOL/L (ref 99–109)
CO2 SERPL-SCNC: 29 MMOL/L (ref 20–31)
CREAT BLD-MCNC: 0.41 MG/DL (ref 0.6–1.3)
CRYPTOC AG CSF QL: NEGATIVE
DEPRECATED RDW RBC AUTO: 45.9 FL (ref 37–54)
ERYTHROCYTE [DISTWIDTH] IN BLOOD BY AUTOMATED COUNT: 12.8 % (ref 11.3–14.5)
GFR SERPL CREATININE-BSD FRML MDRD: >150 ML/MIN/1.73
GLOBULIN UR ELPH-MCNC: 2.7 GM/DL
GLUCOSE BLD-MCNC: 91 MG/DL (ref 70–100)
HCT VFR BLD AUTO: 35.5 % (ref 34.5–44)
HGB BLD-MCNC: 11.4 G/DL (ref 11.5–15.5)
MCH RBC QN AUTO: 31.6 PG (ref 27–31)
MCHC RBC AUTO-ENTMCNC: 32.1 G/DL (ref 32–36)
MCV RBC AUTO: 98.3 FL (ref 80–99)
PLATELET # BLD AUTO: 174 10*3/MM3 (ref 150–450)
PMV BLD AUTO: 10.5 FL (ref 6–12)
POTASSIUM BLD-SCNC: 4 MMOL/L (ref 3.5–5.5)
PROT SERPL-MCNC: 6 G/DL (ref 5.7–8.2)
RBC # BLD AUTO: 3.61 10*6/MM3 (ref 3.89–5.14)
SODIUM BLD-SCNC: 136 MMOL/L (ref 132–146)
UNIT  ABO: NORMAL
UNIT  ABO: NORMAL
UNIT  RH: NORMAL
UNIT  RH: NORMAL
WBC NRBC COR # BLD: 4.04 10*3/MM3 (ref 3.5–10.8)

## 2018-11-11 PROCEDURE — 87899 AGENT NOS ASSAY W/OPTIC: CPT | Performed by: INTERNAL MEDICINE

## 2018-11-11 PROCEDURE — 86612 BLASTOMYCES ANTIBODY: CPT

## 2018-11-11 PROCEDURE — 85027 COMPLETE CBC AUTOMATED: CPT | Performed by: INTERNAL MEDICINE

## 2018-11-11 PROCEDURE — 87385 HISTOPLASMA CAPSUL AG IA: CPT | Performed by: INTERNAL MEDICINE

## 2018-11-11 PROCEDURE — 86698 HISTOPLASMA ANTIBODY: CPT

## 2018-11-11 PROCEDURE — 80053 COMPREHEN METABOLIC PANEL: CPT | Performed by: INTERNAL MEDICINE

## 2018-11-11 PROCEDURE — 83520 IMMUNOASSAY QUANT NOS NONAB: CPT | Performed by: INTERNAL MEDICINE

## 2018-11-11 PROCEDURE — 25010000002 MORPHINE PER 10 MG: Performed by: THORACIC SURGERY (CARDIOTHORACIC VASCULAR SURGERY)

## 2018-11-11 PROCEDURE — 86256 FLUORESCENT ANTIBODY TITER: CPT | Performed by: INTERNAL MEDICINE

## 2018-11-11 PROCEDURE — 25010000002 HEPARIN (PORCINE) PER 1000 UNITS: Performed by: PHYSICIAN ASSISTANT

## 2018-11-11 PROCEDURE — 99232 SBSQ HOSP IP/OBS MODERATE 35: CPT | Performed by: INTERNAL MEDICINE

## 2018-11-11 PROCEDURE — 86698 HISTOPLASMA ANTIBODY: CPT | Performed by: INTERNAL MEDICINE

## 2018-11-11 PROCEDURE — 71045 X-RAY EXAM CHEST 1 VIEW: CPT

## 2018-11-11 PROCEDURE — 86612 BLASTOMYCES ANTIBODY: CPT | Performed by: INTERNAL MEDICINE

## 2018-11-11 PROCEDURE — 86606 ASPERGILLUS ANTIBODY: CPT | Performed by: INTERNAL MEDICINE

## 2018-11-11 PROCEDURE — 86635 COCCIDIOIDES ANTIBODY: CPT | Performed by: INTERNAL MEDICINE

## 2018-11-11 RX ADMIN — CETIRIZINE HYDROCHLORIDE 10 MG: 10 TABLET, FILM COATED ORAL at 08:09

## 2018-11-11 RX ADMIN — POLYETHYLENE GLYCOL 3350 17 G: 17 POWDER, FOR SOLUTION ORAL at 08:09

## 2018-11-11 RX ADMIN — PROGESTERONE 100 MG: 100 CAPSULE ORAL at 08:09

## 2018-11-11 RX ADMIN — METOPROLOL SUCCINATE 25 MG: 25 TABLET, EXTENDED RELEASE ORAL at 08:09

## 2018-11-11 RX ADMIN — OXYCODONE HYDROCHLORIDE AND ACETAMINOPHEN 1 TABLET: 7.5; 325 TABLET ORAL at 21:02

## 2018-11-11 RX ADMIN — HEPARIN SODIUM 5000 UNITS: 5000 INJECTION, SOLUTION INTRAVENOUS; SUBCUTANEOUS at 08:08

## 2018-11-11 RX ADMIN — ESCITALOPRAM OXALATE 10 MG: 10 TABLET ORAL at 08:09

## 2018-11-11 RX ADMIN — OXYCODONE HYDROCHLORIDE AND ACETAMINOPHEN 1 TABLET: 7.5; 325 TABLET ORAL at 05:12

## 2018-11-11 RX ADMIN — MORPHINE SULFATE 4 MG: 4 INJECTION, SOLUTION INTRAMUSCULAR; INTRAVENOUS at 10:03

## 2018-11-11 RX ADMIN — OXYCODONE HYDROCHLORIDE AND ACETAMINOPHEN 1 TABLET: 7.5; 325 TABLET ORAL at 13:30

## 2018-11-11 RX ADMIN — IBUPROFEN 400 MG: 400 TABLET ORAL at 05:11

## 2018-11-11 RX ADMIN — HEPARIN SODIUM 5000 UNITS: 5000 INJECTION, SOLUTION INTRAVENOUS; SUBCUTANEOUS at 21:02

## 2018-11-11 RX ADMIN — MORPHINE SULFATE 4 MG: 4 INJECTION, SOLUTION INTRAMUSCULAR; INTRAVENOUS at 02:09

## 2018-11-11 NOTE — PROGRESS NOTES
Intensive Care Follow-up      LOS: 3 days     Ms. Gloria Smith, 43 y.o. female is followed for: Glycemic, Electrolyte, Respiratory, and Medical management     Subjective - Interval History     No problems overnight  Pain still moderate  No air leak from chest tube  Less than 100 mL reported out overnight via chest tube    The patient's relevant past medical, surgical and social history were reviewed and updated in Epic as appropriate.     Objective     Infusions:    lactated ringers 9 mL/hr Last Rate: 9 mL/hr (11/08/18 1146)   sodium chloride 30 mL/hr Last Rate: 30 mL/hr (11/08/18 1735)     Medications:    cetirizine 10 mg Oral Daily   escitalopram 10 mg Oral Daily   heparin (porcine) 5,000 Units Subcutaneous Q12H   metoprolol succinate XL 25 mg Oral Daily   polyethylene glycol 17 g Oral Daily   progesterone 100 mg Oral Daily     Intake/Output       11/10/18 0700 - 11/11/18 0659    Intake (ml) --    Output (ml) 1570    Net (ml) -1570        Vital Sign Min/Max for last 24 hours  Temp  Min: 98.3 °F (36.8 °C)  Max: 99.3 °F (37.4 °C)   BP  Min: 107/66  Max: 141/101   Pulse  Min: 87  Max: 110   Resp  Min: 16  Max: 18   SpO2  Min: 91 %  Max: 98 %   No Data Recorded        Physical Exam:   GENERAL: Awake, no distress   HEENT: No adenopathy or thyromegaly   LUNGS: Decreased breath sounds without wheezes or crackles.  No air leak from left-sided chest tube   HEART: Regular rate and rhythm without murmurs   ABDOMEN: Soft, nontender   EXTREMITIES: Palpable pulses.  No edema or cyanosis   NEURO/PSYCH: Awake and alert.  No deficits.  Follows commands    Results from last 7 days   Lab Units  11/11/18   0318 11/09/18   0440  11/07/18   1652   WBC 10*3/mm3  4.04  5.93  3.94   HEMOGLOBIN g/dL  11.4*  12.0  12.7   PLATELETS 10*3/mm3  174  190  181     Results from last 7 days   Lab Units  11/11/18   0318 11/09/18   0440  11/07/18   1652   SODIUM mmol/L  136  132  135   POTASSIUM mmol/L  4.0  4.3  3.9   CO2 mmol/L  29.0  25.0   25.0   BUN mg/dL  6*  6*  11   CREATININE mg/dL  0.41*  0.47*  0.54*   GLUCOSE mg/dL  91  138*  86     Estimated Creatinine Clearance: 199.1 mL/min (A) (by C-G formula based on SCr of 0.41 mg/dL (L)).    Results from last 7 days   Lab Units  11/07/18   1652   HEMOGLOBIN A1C %  5.00           No results found for: LACTATE       Images: Chest x-ray without consolidation or pneumothorax    I reviewed the patient's results and images.     Impression      Active Hospital Problems    Diagnosis   • **Pulmonary infiltrates, nodular (S/P Surgical lung biopsy)   • Positive QuantiFERON-TB Gold test   • Crohn disease (CMS/HCC)         Plan        Follow-up cultures and serologies  We'll need follow-up with Dr. Herrera in the office after discharge  Chest tube management per CVT    Plan of care and goals reviewed with mulitdisciplinary team at daily rounds   I discussed the patient's findings and my recommendations with patient and nursing staff       NIKHIL Gore MD  Pulmonary and Critical Care Medicine

## 2018-11-11 NOTE — PROGRESS NOTES
Gloria Smith  [id number]  1975    POD# 3 S/P left VATS with wedge resection    CC: I feel a lot better but I did like to get these tubes    Subjective:  43-year-old  female now 3 days postoperative wedge resection of a left pulmonary nodule.  Pathology report is still pending.  The patient is doing well.  Chest tube drainage has decreased.  There is no air leak.      Pulmonary infiltrates, nodular (S/P Surgical lung biopsy)    Crohn disease (CMS/HCC)    Positive QuantiFERON-TB Gold test      Objective:    Vital Signs:  Temp:  [98.3 °F (36.8 °C)-99.3 °F (37.4 °C)] 98.3 °F (36.8 °C)  Heart Rate:  [] 98  Resp:  [16-18] 18  BP: (107-141)/() 126/84    Physical Exam:   General Appearance: alert, appears stated age    Lungs: clear to auscultation    Heart: regular rhythm & normal rate, normal S1, S2,no murmur    Skin:warm and dry        Results from last 7 days   Lab Units  11/11/18   0318   WBC 10*3/mm3  4.04   HEMOGLOBIN g/dL  11.4*   HEMATOCRIT %  35.5   PLATELETS 10*3/mm3  174     Results from last 7 days   Lab Units  11/11/18   0318   SODIUM mmol/L  136   POTASSIUM mmol/L  4.0   CHLORIDE mmol/L  102   CO2 mmol/L  29.0   BUN mg/dL  6*   CREATININE mg/dL  0.41*   GLUCOSE mg/dL  91   CALCIUM mg/dL  8.5*       Imaging Results (last 24 hours)     Procedure Component Value Units Date/Time    XR Chest 1 View [[id number]] Collected:  11/11/18 0907     Updated:  11/11/18 0907    Narrative:          EXAMINATION: XR CHEST 1 VW-      INDICATION: Resp Distress; R91.1-Solitary pulmonary nodule; R91.8-Other  nonspecific abnormal finding of lung field      COMPARISON: 11/9/2018     FINDINGS: Large bore left thoracotomy tube remains in place. There is  resolving bibasilar atelectasis now mild. No new pulmonary parenchymal  disease is seen. Heart is upper limits of normal.           Impression:       Resolving bibasilar atelectasis. No new chest disease.                 Diagnosis:  Bilateral pulmonary  nodules (pathology pending).      Plan: Remove chest tubes.  Transfer to telemetry.  I have reviewed, verified, and confirmed the above history and current status.  I have examined the patient and confirmed the above physical findings.     Mark Walker MD  11/11/18  12:12 PM

## 2018-11-11 NOTE — PLAN OF CARE
Problem: Patient Care Overview  Goal: Plan of Care Review  Outcome: Ongoing (interventions implemented as appropriate)      Problem: Sleep Pattern Disturbance (Adult)  Goal: Identify Related Risk Factors and Signs and Symptoms  Outcome: Ongoing (interventions implemented as appropriate)   11/11/18 0335   Sleep Pattern Disturbance (Adult)   Related Risk Factors (Sleep Pattern Disturbance) disease related;disordered breathing;pain/discomfort;noise/lighting/odors;privacy limited;unfamiliar surroundings     Goal: Adequate Sleep/Rest  Outcome: Ongoing (interventions implemented as appropriate)   11/11/18 0335   Sleep Pattern Disturbance (Adult)   Adequate Sleep/Rest making progress toward outcome       Problem: Breathing Pattern Ineffective (Adult)  Goal: Anxiety/Fear Reduction  Outcome: Ongoing (interventions implemented as appropriate)   11/11/18 0335   Breathing Pattern Ineffective (Adult)   Anxiety/Fear Reduction making progress toward outcome

## 2018-11-11 NOTE — PLAN OF CARE
Problem: Patient Care Overview  Goal: Plan of Care Review  Outcome: Ongoing (interventions implemented as appropriate)   11/11/18 8231   Coping/Psychosocial   Plan of Care Reviewed With patient   OTHER   Outcome Summary pt transferred to floor, RA, still complaining of pain. possible d/c tomrrow    Plan of Care Review   Progress improving       Problem: Skin Injury Risk (Adult)  Goal: Identify Related Risk Factors and Signs and Symptoms  Outcome: Outcome(s) achieved Date Met: 11/11/18    Goal: Skin Health and Integrity  Outcome: Outcome(s) achieved Date Met: 11/11/18      Problem: Lung Surgery (via Thoracotomy) (Adult)  Goal: Signs and Symptoms of Listed Potential Problems Will be Absent, Minimized or Managed (Lung Surgery)  Outcome: Ongoing (interventions implemented as appropriate)      Problem: Pain, Acute (Adult)  Goal: Identify Related Risk Factors and Signs and Symptoms  Outcome: Outcome(s) achieved Date Met: 11/11/18    Goal: Acceptable Pain Control/Comfort Level  Outcome: Ongoing (interventions implemented as appropriate)      Problem: Breathing Pattern Ineffective (Adult)  Goal: Identify Related Risk Factors and Signs and Symptoms  Outcome: Outcome(s) achieved Date Met: 11/11/18    Goal: Effective Oxygenation/Ventilation  Outcome: Outcome(s) achieved Date Met: 11/11/18

## 2018-11-12 ENCOUNTER — APPOINTMENT (OUTPATIENT)
Dept: GENERAL RADIOLOGY | Facility: HOSPITAL | Age: 43
End: 2018-11-12

## 2018-11-12 LAB
BACTERIA SPEC AEROBE CULT: NORMAL
BACTERIA SPEC AEROBE CULT: NORMAL
GRAM STN SPEC: NORMAL

## 2018-11-12 PROCEDURE — G0009 ADMIN PNEUMOCOCCAL VACCINE: HCPCS | Performed by: HOSPITALIST

## 2018-11-12 PROCEDURE — G0008 ADMIN INFLUENZA VIRUS VAC: HCPCS | Performed by: HOSPITALIST

## 2018-11-12 PROCEDURE — 25010000002 PNEUMOCOCCAL VAC POLYVALENT PER 0.5 ML: Performed by: HOSPITALIST

## 2018-11-12 PROCEDURE — 90732 PPSV23 VACC 2 YRS+ SUBQ/IM: CPT | Performed by: HOSPITALIST

## 2018-11-12 PROCEDURE — 99232 SBSQ HOSP IP/OBS MODERATE 35: CPT | Performed by: HOSPITALIST

## 2018-11-12 PROCEDURE — 25010000002 HEPARIN (PORCINE) PER 1000 UNITS: Performed by: PHYSICIAN ASSISTANT

## 2018-11-12 PROCEDURE — 25010000002 INFLUENZA VAC SUBUNIT QUAD 0.5 ML SUSPENSION PREFILLED SYRINGE: Performed by: HOSPITALIST

## 2018-11-12 PROCEDURE — 90661 CCIIV3 VAC ABX FR 0.5 ML IM: CPT | Performed by: HOSPITALIST

## 2018-11-12 PROCEDURE — 71045 X-RAY EXAM CHEST 1 VIEW: CPT

## 2018-11-12 PROCEDURE — 99231 SBSQ HOSP IP/OBS SF/LOW 25: CPT | Performed by: THORACIC SURGERY (CARDIOTHORACIC VASCULAR SURGERY)

## 2018-11-12 RX ADMIN — OXYCODONE HYDROCHLORIDE AND ACETAMINOPHEN 1 TABLET: 7.5; 325 TABLET ORAL at 16:44

## 2018-11-12 RX ADMIN — OXYCODONE HYDROCHLORIDE AND ACETAMINOPHEN 1 TABLET: 7.5; 325 TABLET ORAL at 08:31

## 2018-11-12 RX ADMIN — INFLUENZA A VIRUS A/SINGAPORE/GP1908/2015 IVR-180 (H1N1) ANTIGEN (MDCK CELL DERIVED, PROPIOLACTONE INACTIVATED), INFLUENZA A VIRUS A/NORTH CAROLINA/04/2016 (H3N2) HEMAGGLUTININ ANTIGEN (MDCK CELL DERIVED, PROPIOLACTONE INACTIVATED), INFLUENZA B VIRUS B/IOWA/06/2017 HEMAGGLUTININ ANTIGEN (MDCK CELL DERIVED, PROPIOLACTONE INACTIVATED), INFLUENZA B VIRUS B/SINGAPORE/INFTT-16-0610/2016 HEMAGGLUTININ ANTIGEN (MDCK CELL DERIVED, PROPIOLACTONE INACTIVATED) 0.5 ML: 15; 15; 15; 15 INJECTION, SUSPENSION INTRAMUSCULAR at 17:50

## 2018-11-12 RX ADMIN — PNEUMOCOCCAL VACCINE POLYVALENT 0.5 ML
25; 25; 25; 25; 25; 25; 25; 25; 25; 25; 25; 25; 25; 25; 25; 25; 25; 25; 25; 25; 25; 25; 25 INJECTION, SOLUTION INTRAMUSCULAR; SUBCUTANEOUS at 16:43

## 2018-11-12 RX ADMIN — OXYCODONE HYDROCHLORIDE AND ACETAMINOPHEN 1 TABLET: 7.5; 325 TABLET ORAL at 22:45

## 2018-11-12 RX ADMIN — HEPARIN SODIUM 5000 UNITS: 5000 INJECTION, SOLUTION INTRAVENOUS; SUBCUTANEOUS at 08:31

## 2018-11-12 RX ADMIN — HEPARIN SODIUM 5000 UNITS: 5000 INJECTION, SOLUTION INTRAVENOUS; SUBCUTANEOUS at 21:00

## 2018-11-12 RX ADMIN — OXYCODONE HYDROCHLORIDE AND ACETAMINOPHEN 1 TABLET: 7.5; 325 TABLET ORAL at 02:42

## 2018-11-12 RX ADMIN — CETIRIZINE HYDROCHLORIDE 10 MG: 10 TABLET, FILM COATED ORAL at 08:31

## 2018-11-12 RX ADMIN — METOPROLOL SUCCINATE 25 MG: 25 TABLET, EXTENDED RELEASE ORAL at 08:31

## 2018-11-12 RX ADMIN — ESCITALOPRAM OXALATE 10 MG: 10 TABLET ORAL at 08:31

## 2018-11-12 NOTE — PROGRESS NOTES
Continued Stay Note  [facility]     Patient Name: Gloria Smith  MRN: [id number]  Today's Date: 11/12/2018    Admit Date: 11/8/2018    Discharge Plan     Row Name 11/12/18 1653       Plan    Plan  Home with spouse    Patient/Family in Agreement with Plan  yes    Plan Comments  Spoke with patient at bedside.  Patient plans to discharge home with family when medically ready.  Patient has no needs at this time.  Case management will continue to follow for discharge planning.      Final Discharge Disposition Code  01 - home or self-care        Discharge Codes    No documentation.       Expected Discharge Date and Time     Expected Discharge Date Expected Discharge Time    Nov 13, 2018             Nichole Kaur RN

## 2018-11-12 NOTE — PLAN OF CARE
Problem: Patient Care Overview  Goal: Plan of Care Review  Outcome: Ongoing (interventions implemented as appropriate)   11/12/18 2660   Coping/Psychosocial   Plan of Care Reviewed With patient   OTHER   Outcome Summary Pt's pain controlled with PO pain meds. Slept through most of night. VSS on room air. Continue to monitor    Plan of Care Review   Progress improving       Problem: Lung Surgery (via Thoracotomy) (Adult)  Goal: Anesthesia/Sedation Recovery  Outcome: Ongoing (interventions implemented as appropriate)      Problem: Pain, Acute (Adult)  Goal: Acceptable Pain Control/Comfort Level  Outcome: Ongoing (interventions implemented as appropriate)      Problem: Sleep Pattern Disturbance (Adult)  Goal: Adequate Sleep/Rest  Outcome: Ongoing (interventions implemented as appropriate)      Problem: Breathing Pattern Ineffective (Adult)  Goal: Anxiety/Fear Reduction  Outcome: Ongoing (interventions implemented as appropriate)

## 2018-11-12 NOTE — PROGRESS NOTES
[facility] Medicine Services  PROGRESS NOTE    Patient Name: Gloria Smith  : 1975  MRN: [id number]    Date of Admission: 2018  Length of Stay: 4  Primary Care Physician: Shaun Taylor MD    Subjective   Subjective     CC:  Lung nodules    HPI:  ICU downgrade.  Fever of 102 at 7pm.  No leukocytosis.  Seems to have pain with deep inspiration.  Says she snores really loud and may have sleep apnea.  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    Objective   Objective     Vital Signs:   Temp:  [97.9 °F (36.6 °C)-102 °F (38.9 °C)] 97.9 °F (36.6 °C)  Heart Rate:  [] 97  Resp:  [18] 18  BP: (122-147)/(85-99) 122/85        Physical Exam:    Constitutional: No acute distress, awake, alert  HENT: NCAT, mucous membranes moist  Respiratory: poor inspiratory effort, diminished breath sounds bases, no wheezes   Cardiovascular: RRR, s1 and s2  Gastrointestinal: Positive bowel sounds, soft, nontender, obese abdomen  Musculoskeletal: No bilateral ankle edema  Psychiatric: Appropriate affect, cooperative  Neurologic: Oriented x 3, strength symmetric in all extremities, Cranial Nerves grossly intact to confrontation, speech clear  Skin: No rashes      Results Reviewed:  I have personally reviewed current lab, radiology, and data and agree.    Results from last 7 days   Lab Units  18   1652   WBC 10*3/mm3  4.04  5.93  3.94   HEMOGLOBIN g/dL  11.4*  12.0  12.7   HEMATOCRIT %  35.5  36.7  38.0   PLATELETS 10*3/mm3  174  190  181   INR    --    --   0.92     Results from last 7 days   Lab Units  18   0440  18   1652   SODIUM mmol/L  136  132  135   POTASSIUM mmol/L  4.0  4.3  3.9   CHLORIDE mmol/L  102  98*  100   CO2 mmol/L  29.0  25.0   25.0   BUN mg/dL  6*  6*  11   CREATININE mg/dL  0.41*  0.47*  0.54*   GLUCOSE mg/dL  91  138*  86   CALCIUM mg/dL  8.5*  8.8  8.8   ALT (SGPT) U/L  41*   --   89*   AST (SGOT) U/L  26   --   67*     Estimated Creatinine Clearance: 199.1 mL/min (A) (by C-G formula based on SCr of 0.41 mg/dL (L)).  No results found for: BNP    Microbiology Results Abnormal     Procedure Component Value - Date/Time    Tissue / Bone Culture - Tissue, Lung, Left Upper Lobe [[id number]] Collected:  11/08/18 1432    Lab Status:  Final result Specimen:  Tissue from Lung, Left Upper Lobe Updated:  11/12/18 0644     Tissue Culture No growth at 4 days     Gram Stain Many (4+) WBCs seen      Many (4+) Red blood cells      No organisms seen    Tissue / Bone Culture - Tissue, Lung, Left Lower Lobe [[id number]] Collected:  11/08/18 1434    Lab Status:  Final result Specimen:  Tissue from Lung, Left Lower Lobe Updated:  11/12/18 0644     Tissue Culture No growth at 4 days     Gram Stain Many (4+) Red blood cells      Many (4+) WBCs seen      No organisms seen    Anaerobic Culture - Tissue, Lung, Left Upper Lobe [[id number]]  (Normal) Collected:  11/08/18 1432    Lab Status:  Preliminary result Specimen:  Tissue from Lung, Left Upper Lobe Updated:  11/09/18 1414     Culture No anaerobes isolated    Anaerobic Culture - Tissue, Lung, Left Lower Lobe [[id number]]  (Normal) Collected:  11/08/18 1434    Lab Status:  Preliminary result Specimen:  Tissue from Lung, Left Lower Lobe Updated:  11/09/18 1414     Culture No anaerobes isolated    AFB Culture - Tissue, Lung, Left Upper Lobe [[id number]] Collected:  11/08/18 1432    Lab Status:  Preliminary result Specimen:  Tissue from Lung, Left Upper Lobe Updated:  11/09/18 1129     AFB Stain No acid fast bacilli seen on concentrated smear    AFB Culture - Tissue, Lung, Left Lower Lobe [[id number]] Collected:  11/08/18 1434    Lab Status:  Preliminary result Specimen:  Tissue from Lung, Left Lower Lobe Updated:   11/09/18 1129     AFB Stain No acid fast bacilli seen on concentrated smear          Imaging Results (last 24 hours)     Procedure Component Value Units Date/Time    XR Chest 1 View [[id number]] Collected:  11/12/18 0915     Updated:  11/12/18 1107    Narrative:       EXAMINATION: XR CHEST 1 VW- 11/12/2018     INDICATION: PNEUMOTHORAX      COMPARISON: 11/11/2018     FINDINGS: Portable chest reveals minimal pneumomediastinum. The chest  tube on the left has been removed. There is increased markings seen in  the lung bases bilaterally with small bilateral pleural effusions  unchanged in the interval. Degenerative changes seen within the spine.  No new focal parenchymal opacification present.           Impression:       There is a small amount of pneumomediastinum identified with  removal of the left chest tube and no definite pneumothorax. No change  in the mild increased markings at the lung bases bilaterally or low lung  volumes bilaterally.     D:  11/12/2018  E:  11/12/2018     This report was finalized on 11/12/2018 11:05 AM by Dr. Josefina Quinones MD.       XR Chest 1 View [[id number]] Collected:  11/11/18 0907     Updated:  11/11/18 2311    Narrative:          EXAMINATION: XR CHEST 1 VW - 11/11/2018     INDICATION: R91.1-Solitary pulmonary nodule; R91.8-Other nonspecific  abnormal finding of lung field.      COMPARISON: 11/9/2018.     FINDINGS: Large-bore left thoracotomy tube remains in place. There is  resolving bibasilar atelectasis, now mild. No new pulmonary parenchymal  disease is seen. Heart is upper limits of normal.           Impression:       Resolving bibasilar atelectasis. No new chest disease.     DICTATED:   11/11/2018  EDITED/ls :   11/11/2018      This report was finalized on 11/11/2018 11:09 PM by DR. Chay Murray MD.           Results for orders placed during the hospital encounter of 10/04/18   Adult Transthoracic Echo Complete W/ Cont if Necessary Per Protocol    Narrative · Left ventricular  systolic function is normal. Estimated EF = 60%.  · Normal valvular structure and function  · Normal diastolic function          I have reviewed the medications.      cetirizine 10 mg Oral Daily   escitalopram 10 mg Oral Daily   heparin (porcine) 5,000 Units Subcutaneous Q12H   metoprolol succinate XL 25 mg Oral Daily   pneumococcal polysaccharide 23-valent 0.5 mL Intramuscular Once   polyethylene glycol 17 g Oral Daily   progesterone 100 mg Oral Daily       Assessment/Plan   Assessment / Plan     Active Hospital Problems    Diagnosis   • **Pulmonary infiltrates, nodular (S/P Surgical lung biopsy)   • Positive QuantiFERON-TB Gold test   • Crohn disease (CMS/HCC)     Brief Hospital Course to date:  Gloria Smith is a 43 y.o. female referred from Dr. Herrera to Dr. Oates for Pulmonary Nodules.  Admitted for bronch/VATS.  Downgraded from ICU.    Assessment & Plan:    ICU Downgrade    Bilateral Pulmonary Nodules   - immunosuppressed with Crohn's   - bronch / Left VATS / Left Lung Bx / Resection  - downgraded from ICU - Chest Tube pulled  Crohn's Disease  - immunosupression  Tobacco Use  Anemia  Obesity  Hormone Therapy  - on estradiol   - on progesterone (Prometrium)  Suspected Sleep Apnea  - never had sleep study  - bmi 38    Fever  Small pneumo and possible pneumomediastinum  Low lung volumes - deep inspiration is painful for patient and may be favoring and not taking deep breaths  Needs incentive spirometer exercises / ambulation    DVT Prophylaxis:  Heparin SC    CODE STATUS:   Code Status and Medical Interventions:   Ordered at: 11/08/18 1511     Code Status:    CPR     Medical Interventions (Level of Support Prior to Arrest):    Full     Disposition: I expect the patient to be discharged TBD      Electronically signed by Rachid Jiang MD, 11/12/18, 1:27 PM.

## 2018-11-12 NOTE — PROGRESS NOTES
Cardiothoracic Surgery Progress Note      POD #: 4-left VATS with wedge resection     LOS: 4 days      Subjective: Up in chair    Chief Complaint: Left-sided chest wall pain    Objective:  Vital Signs vital signs below noted MAXIMUM TEMPERATURE past 24 hours has 102°F  Temp:  [97.9 °F (36.6 °C)-102 °F (38.9 °C)] 97.9 °F (36.6 °C)  Heart Rate:  [] 97  Resp:  [18] 18  BP: (118-147)/(81-99) 122/85    Physical Exam:   General Appearance: Awake and alert and oriented.  Ambulating in the hallway   Lungs:Clear   Heart:No murmurs heard   Skin:   Incision:VATS incisions appear to be healing well     Results:  Results from last 7 days   Lab Units  11/11/18   0318   WBC 10*3/mm3  4.04   HEMOGLOBIN g/dL  11.4*   HEMATOCRIT %  35.5   PLATELETS 10*3/mm3  174     Results from last 7 days   Lab Units  11/11/18   0318   SODIUM mmol/L  136   POTASSIUM mmol/L  4.0   CHLORIDE mmol/L  102   CO2 mmol/L  29.0   BUN mg/dL  6*   CREATININE mg/dL  0.41*   GLUCOSE mg/dL  91   CALCIUM mg/dL  8.5*     Chest x-ray this morning: Small pneumothorax left side with some slight pneumomediastinum    Assessment:#1.  Postoperative day 3 VATS with wedge resection.  Final path pending  #2.  102°F yesterday evening    Plan:Await pathology final report.  Pulmonary toilet-aggressive pulmonary toilet will most likely atelectasis with postop fever    Continue hallway ambulation      Jeffery Spencer MD - 11/12/18 - 11:21 AM

## 2018-11-13 ENCOUNTER — APPOINTMENT (OUTPATIENT)
Dept: GENERAL RADIOLOGY | Facility: HOSPITAL | Age: 43
End: 2018-11-13

## 2018-11-13 VITALS
HEIGHT: 64 IN | BODY MASS INDEX: 37.79 KG/M2 | WEIGHT: 221.34 LBS | OXYGEN SATURATION: 91 % | TEMPERATURE: 98.1 F | HEART RATE: 65 BPM | SYSTOLIC BLOOD PRESSURE: 117 MMHG | RESPIRATION RATE: 16 BRPM | DIASTOLIC BLOOD PRESSURE: 72 MMHG

## 2018-11-13 LAB
C-ANCA TITR SER IF: NORMAL TITER
H CAPSUL AG UR-MCNC: <0.5 NG/ML
Lab: NORMAL
MYELOPEROXIDASE AB SER-ACNC: <9 U/ML (ref 0–9)
P-ANCA ATYPICAL TITR SER IF: NORMAL TITER
P-ANCA TITR SER IF: NORMAL TITER
PROTEINASE3 AB SER IA-ACNC: <3.5 U/ML (ref 0–3.5)

## 2018-11-13 PROCEDURE — 97161 PT EVAL LOW COMPLEX 20 MIN: CPT

## 2018-11-13 PROCEDURE — 71045 X-RAY EXAM CHEST 1 VIEW: CPT

## 2018-11-13 PROCEDURE — 99024 POSTOP FOLLOW-UP VISIT: CPT | Performed by: PHYSICIAN ASSISTANT

## 2018-11-13 PROCEDURE — 99232 SBSQ HOSP IP/OBS MODERATE 35: CPT | Performed by: INTERNAL MEDICINE

## 2018-11-13 PROCEDURE — 25010000002 HEPARIN (PORCINE) PER 1000 UNITS: Performed by: PHYSICIAN ASSISTANT

## 2018-11-13 PROCEDURE — 94799 UNLISTED PULMONARY SVC/PX: CPT

## 2018-11-13 PROCEDURE — 99024 POSTOP FOLLOW-UP VISIT: CPT | Performed by: THORACIC SURGERY (CARDIOTHORACIC VASCULAR SURGERY)

## 2018-11-13 RX ORDER — OXYCODONE AND ACETAMINOPHEN 7.5; 325 MG/1; MG/1
1 TABLET ORAL EVERY 6 HOURS PRN
Qty: 10 TABLET | Refills: 0
Start: 2018-11-13

## 2018-11-13 RX ADMIN — PROGESTERONE 100 MG: 100 CAPSULE ORAL at 08:28

## 2018-11-13 RX ADMIN — CETIRIZINE HYDROCHLORIDE 10 MG: 10 TABLET, FILM COATED ORAL at 08:28

## 2018-11-13 RX ADMIN — METOPROLOL SUCCINATE 25 MG: 25 TABLET, EXTENDED RELEASE ORAL at 08:28

## 2018-11-13 RX ADMIN — POLYETHYLENE GLYCOL 3350 17 G: 17 POWDER, FOR SOLUTION ORAL at 08:28

## 2018-11-13 RX ADMIN — HEPARIN SODIUM 5000 UNITS: 5000 INJECTION, SOLUTION INTRAVENOUS; SUBCUTANEOUS at 08:28

## 2018-11-13 RX ADMIN — OXYCODONE HYDROCHLORIDE AND ACETAMINOPHEN 1 TABLET: 7.5; 325 TABLET ORAL at 04:18

## 2018-11-13 RX ADMIN — IBUPROFEN 400 MG: 400 TABLET ORAL at 08:36

## 2018-11-13 RX ADMIN — ESCITALOPRAM OXALATE 10 MG: 10 TABLET ORAL at 08:28

## 2018-11-13 RX ADMIN — OXYCODONE HYDROCHLORIDE AND ACETAMINOPHEN 1 TABLET: 7.5; 325 TABLET ORAL at 11:03

## 2018-11-13 NOTE — PROGRESS NOTES
[facility] Medicine Services  PROGRESS NOTE    Patient Name: Gloria Smith  : 1975  MRN: [id number]    Date of Admission: 2018  Length of Stay: 5  Primary Care Physician: Shaun Taylor MD    Subjective   Subjective     CC:  Follow-up for pulmonary nodules    HPI:   Patient's only complaint is some pleuritic chest pain with coughing overnight.  This is improved this morning.  She denies any shortness of breath or new symptoms.  She denies any further fevers or chills.    Review of Systems    Fevers and chills resolved, no shortness of breath but some postoperative pleuritic chest pain, no other chest pain or palpitations, no nausea or diarrhea    Otherwise ROS is negative except as mentioned in the HPI.    Objective   Objective     Vital Signs:   Temp:  [97.9 °F (36.6 °C)-98.2 °F (36.8 °C)] 98.1 °F (36.7 °C)  Heart Rate:  [] 65  Resp:  [16-18] 16  BP: (100-132)/(69-86) 117/72        Physical Exam:  Constitutional: No acute distress, awake, alert  HENT: NCAT, mucous membranes moist  Respiratory: Clear to auscultation bilaterally, respiratory effort normal, occasional cough, nonlabored breathing   Cardiovascular: RRR, no murmurs, rubs, or gallops, palpable pedal pulses bilaterally  Gastrointestinal: Positive bowel sounds, soft, nontender, nondistended  Musculoskeletal: No bilateral ankle edema  Psychiatric: Appropriate affect, cooperative  Neurologic: Oriented x 3, strength symmetric in all extremities, Cranial Nerves grossly intact to confrontation, speech clear  Skin: No rashes, postoperative incision CDI with dressing    Results Reviewed:  I have personally reviewed current lab, radiology, and data and agree.    Results from last 7 days   Lab Units  18   0318  18   0440  18   1652   WBC 10*3/mm3  4.04  5.93  3.94   HEMOGLOBIN g/dL  11.4*  12.0  12.7   HEMATOCRIT %  35.5  36.7  38.0   PLATELETS 10*3/mm3  174  190  181   INR    --    --   0.92      Results from last 7 days   Lab Units  11/11/18   0318  11/09/18   0440  11/07/18   1652   SODIUM mmol/L  136  132  135   POTASSIUM mmol/L  4.0  4.3  3.9   CHLORIDE mmol/L  102  98*  100   CO2 mmol/L  29.0  25.0  25.0   BUN mg/dL  6*  6*  11   CREATININE mg/dL  0.41*  0.47*  0.54*   GLUCOSE mg/dL  91  138*  86   CALCIUM mg/dL  8.5*  8.8  8.8   ALT (SGPT) U/L  41*   --   89*   AST (SGOT) U/L  26   --   67*     Estimated Creatinine Clearance: 199.1 mL/min (A) (by C-G formula based on SCr of 0.41 mg/dL (L)).  No results found for: BNP    Microbiology Results Abnormal     Procedure Component Value - Date/Time    Tissue / Bone Culture - Tissue, Lung, Left Upper Lobe [[id number]] Collected:  11/08/18 1432    Lab Status:  Final result Specimen:  Tissue from Lung, Left Upper Lobe Updated:  11/12/18 0644     Tissue Culture No growth at 4 days     Gram Stain Many (4+) WBCs seen      Many (4+) Red blood cells      No organisms seen    Tissue / Bone Culture - Tissue, Lung, Left Lower Lobe [[id number]] Collected:  11/08/18 1434    Lab Status:  Final result Specimen:  Tissue from Lung, Left Lower Lobe Updated:  11/12/18 0644     Tissue Culture No growth at 4 days     Gram Stain Many (4+) Red blood cells      Many (4+) WBCs seen      No organisms seen    Anaerobic Culture - Tissue, Lung, Left Upper Lobe [[id number]]  (Normal) Collected:  11/08/18 1432    Lab Status:  Preliminary result Specimen:  Tissue from Lung, Left Upper Lobe Updated:  11/09/18 1414     Culture No anaerobes isolated    Anaerobic Culture - Tissue, Lung, Left Lower Lobe [[id number]]  (Normal) Collected:  11/08/18 1434    Lab Status:  Preliminary result Specimen:  Tissue from Lung, Left Lower Lobe Updated:  11/09/18 1414     Culture No anaerobes isolated    AFB Culture - Tissue, Lung, Left Upper Lobe [[id number]] Collected:  11/08/18 1432    Lab Status:  Preliminary result Specimen:  Tissue from Lung, Left Upper Lobe Updated:  11/09/18 1129     AFB Stain No acid  fast bacilli seen on concentrated smear    AFB Culture - Tissue, Lung, Left Lower Lobe [[id number]] Collected:  11/08/18 1434    Lab Status:  Preliminary result Specimen:  Tissue from Lung, Left Lower Lobe Updated:  11/09/18 1129     AFB Stain No acid fast bacilli seen on concentrated smear          Imaging Results (last 24 hours)     Procedure Component Value Units Date/Time    XR Chest 1 View [[id number]] Collected:  11/12/18 0915     Updated:  11/12/18 1107    Narrative:       EXAMINATION: XR CHEST 1 VW- 11/12/2018     INDICATION: PNEUMOTHORAX      COMPARISON: 11/11/2018     FINDINGS: Portable chest reveals minimal pneumomediastinum. The chest  tube on the left has been removed. There is increased markings seen in  the lung bases bilaterally with small bilateral pleural effusions  unchanged in the interval. Degenerative changes seen within the spine.  No new focal parenchymal opacification present.           Impression:       There is a small amount of pneumomediastinum identified with  removal of the left chest tube and no definite pneumothorax. No change  in the mild increased markings at the lung bases bilaterally or low lung  volumes bilaterally.     D:  11/12/2018  E:  11/12/2018     This report was finalized on 11/12/2018 11:05 AM by Dr. Josefina Quinones MD.           Results for orders placed during the hospital encounter of 10/04/18   Adult Transthoracic Echo Complete W/ Cont if Necessary Per Protocol    Narrative · Left ventricular systolic function is normal. Estimated EF = 60%.  · Normal valvular structure and function  · Normal diastolic function          I have reviewed the medications.      cetirizine 10 mg Oral Daily   escitalopram 10 mg Oral Daily   heparin (porcine) 5,000 Units Subcutaneous Q12H   metoprolol succinate XL 25 mg Oral Daily   polyethylene glycol 17 g Oral Daily   progesterone 100 mg Oral Daily         Assessment/Plan   Assessment / Plan     Active Hospital Problems    Diagnosis   •  "**Pulmonary infiltrates, nodular (S/P Surgical lung biopsy)   • Positive QuantiFERON-TB Gold test   • Crohn disease (CMS/HCC)          Brief Hospital Course to date:  Gloria Smith is a 43 y.o. female referred from Dr. Herrera to Dr. Oates for Pulmonary Nodules.  Admitted for bronch/VATS.  Downgraded from ICU.     Assessment & Plan:     ICU Downgrade     Bilateral Pulmonary Nodules   - immunosuppressed with Crohn's   - bronch / Left VATS / Left Lung Bx / Resection  - downgraded from ICU - Chest Tube pulled  - Preliminary pathology appears to show pneumonitis with poorly formed granulomas.  - Pulmonology team recommending \"follow-up with Dr. Herrera in the office after discharge\"  Crohn's Disease  - immunosupression, on biologic.  Denies bowel complaints.  Stable  Tobacco Use: Cessation counseled  Anemia: Normocytic and mild, likely anemia of chronic disease.  Stable with no bleeding  Obesity: Weight loss recommended.  PMI recorded as 38.  Hormone Therapy  - on estradiol   - on progesterone (Prometrium)  Suspected Sleep Apnea  - never had sleep study  - bmi 38  - Follow-up in pulmonology clinic    Fever likely related to atelectasis and is resolved with improved use of incentive spirometer.  Chest x-ray findings from recent chest x-ray noted.  This could be related to removal of chest tube.  No hypoxia.  Clinical exam is normal and vital signs are normal.  Encouraged ambulation.      Patient appears stable today.  From internal medicine standpoint she can discharge at any time in surgery feels is appropriate.       DVT Prophylaxis:  Heparin SC     CODE STATUS:       Code Status and Medical Interventions:   Ordered at: 11/08/18 1511     Code Status:     CPR     Medical Interventions (Level of Support Prior to Arrest):     Full      Disposition: Deferred to surgery.               Electronically signed by Zenon Hernandez MD, 11/13/18, 8:01 AM.      "

## 2018-11-13 NOTE — PROGRESS NOTES
Case Management Discharge Note    Final Note: Spoke with patient at bedside.  Patient will be discharging home today with spouse.  Family will transport.  Patient has no needs at this time.      Destination      No service has been selected for the patient.      Durable Medical Equipment      No service has been selected for the patient.      Dialysis/Infusion      No service has been selected for the patient.      Home Medical Care      No service has been selected for the patient.      Community Resources      No service has been selected for the patient.             Final Discharge Disposition Code: 01 - home or self-care

## 2018-11-13 NOTE — THERAPY DISCHARGE NOTE
Acute Care - Physical Therapy Initial Eval/Discharge  [facility]     Patient Name: Gloria Smith  : 1975  MRN: [id number]  Today's Date: 2018   Onset of Illness/Injury or Date of Surgery: 18  Date of Referral to PT: 18  Referring Physician: MD Apolinar      Admit Date: 2018    Visit Dx:    ICD-10-CM ICD-9-CM   1. Impaired functional mobility, balance, gait, and endurance Z74.09 V49.89   2. Lung nodule R91.1 793.11   3. Pulmonary infiltrates R91.8 793.19     Patient Active Problem List   Diagnosis   • Crohn disease (CMS/HCC)   • Hypertension   • Anemia, popst-op   • Positive QuantiFERON-TB Gold test   • Pulmonary infiltrates, nodular (S/P Surgical lung biopsy)   • Lung nodule     Past Medical History:   Diagnosis Date   • Anxiety    • Arthritis    • BRCA gene positive    • Constipation    • Crohn's disease (CMS/HCC)    • Diarrhea    • Fatty liver    • GERD (gastroesophageal reflux disease)    • History of transfusion    • Hypertension    • Short of breath on exertion    • TB (tuberculosis) 2018    quantiferon gold TB POSITIVE AUG 2018 - 3 [employer] CULTURES DONE  - NO ISOLATION REQUIRED   • Wears glasses S     Past Surgical History:   Procedure Laterality Date   • ANKLE SURGERY Left     orif, fracture, later removal hardware   • APPENDECTOMY     • BILATERAL BREAST REDUCTION     • BREAST BIOPSY Left    • CHOLECYSTECTOMY     • COLONOSCOPY         • ENDOSCOPY     • EXPLORATORY LAPAROTOMY     • OOPHORECTOMY Bilateral    • TONSILLECTOMY     • WISDOM TOOTH EXTRACTION            PT ASSESSMENT (last 12 hours)      Physical Therapy Evaluation     Row Name 18 0845          PT Evaluation Time/Intention    Subjective Information  no complaints  -KR     Document Type  discharge evaluation/summary  -KR     Mode of Treatment  physical therapy  -KR     Patient Effort  good  -KR     Symptoms Noted During/After Treatment  none  -KR     Row Name 18 0845          General  Information    Patient Profile Reviewed?  yes  -KR     Onset of Illness/Injury or Date of Surgery  11/08/18  -KR     Referring Physician  MD Apolinar  -KR     Patient Observations  alert;cooperative;agree to therapy  -KR     Prior Level of Function  independent:;all household mobility;gait;transfer;ADL's;dressing;bathing  -KR     Equipment Currently Used at Home  none  -KR     Pertinent History of Current Functional Problem  Pt presents with bilateral lung nodules. Differential is most likely infection given her immunosuppression. Bronchoscopy with L VATS and wedge resection on 11/08/18.   -KR     Existing Precautions/Restrictions  no known precautions/restrictions  -KR     Risks Reviewed  patient:;LOB;dizziness;increased discomfort;change in vital signs  -KR     Benefits Reviewed  patient:;improve function;increase independence;increase strength;increase balance  -KR     Barriers to Rehab  none identified  -KR     Row Name 11/13/18 0845          Relationship/Environment    Lives With  spouse  -KR     Row Name 11/13/18 0845          Resource/Environmental Concerns    Current Living Arrangements  home/apartment/condo  -KR     Resource/Environmental Concerns  none  -KR     Transportation Concerns  car, none  -KR     Row Name 11/13/18 0845          Home Main Entrance    Number of Stairs, Main Entrance  three  -KR     Stair Railings, Main Entrance  railing on right side (ascending)  -KR     Row Name 11/13/18 0845          Cognitive Assessment/Interventions    Additional Documentation  Cognitive Assessment/Intervention (Group)  -KR     Row Name 11/13/18 0845          Cognitive Assessment/Intervention- PT/OT    Affect/Mental Status (Cognitive)  WFL  -KR     Orientation Status (Cognition)  oriented x 4  -KR     Follows Commands (Cognition)  WFL  -KR     Cognitive Function (Cognitive)  WFL  -KR     Personal Safety Interventions  fall prevention program maintained;gait belt;nonskid shoes/slippers when out of bed  -KR     Row  Name 11/13/18 0845          Bed Mobility Assessment/Treatment    Bed Mobility Assessment/Treatment  supine-sit;sit-supine  -KR     Supine-Sit [place] (Bed Mobility)  independent  -KR     Sit-Supine [place] (Bed Mobility)  independent  -KR     Assistive Device (Bed Mobility)  head of bed elevated  -KR     Comment (Bed Mobility)  Pt demonstrated good safety awareness and technique with bed mobility.   -KR     Row Name 11/13/18 0845          Transfer Assessment/Treatment    Transfer Assessment/Treatment  sit-stand transfer;stand-sit transfer  -KR     Comment (Transfers)  Pt demonstrated good safety awareness with transfers.   -KR     Sit-Stand [place] (Transfers)  independent  -KR     Stand-Sit [place] (Transfers)  independent  -KR     Row Name 11/13/18 0845          Gait/Stairs Assessment/Training    Gait/Stairs Assessment/Training  gait/ambulation independence  -KR     [place] Level (Gait)  supervision  -KR     Distance in Feet (Gait)  500  -KR     Pattern (Gait)  step-through  -KR     Bilateral Gait Deviations  heel strike decreased  -KR     Comment (Gait/Stairs)  Pt demonstrated step through gait pattern with appropriate gait speed and step length. No LOB noted. Pt had no c/o pain or SOA during ambulation.   -KR     Row Name 11/13/18 0845          General ROM    GENERAL ROM COMMENTS  BLE WFL   -KR     Row Name 11/13/18 0845          MMT (Manual Muscle Testing)    General MMT Comments  BLE grossly 4+/5  -KR     Row Name 11/13/18 0845          Motor Assessment/Intervention    Additional Documentation  Balance (Group)  -KR     Row Name 11/13/18 0845          Balance    Balance  static sitting balance;static standing balance;dynamic standing balance  -KR     Row Name 11/13/18 0845          Static Sitting Balance    Level of [place] (Unsupported Sitting, Static Balance)  independent  -KR     Sitting Position (Unsupported Sitting, Static Balance)  sitting on edge of bed  -KR     Row Name  11/13/18 0845          Static Standing Balance    Level of [place] (Supported Standing, Static Balance)  independent  -KR     Row Name 11/13/18 0845          Dynamic Standing Balance    Level of [place], Reaches Outside Midline (Standing, Dynamic Balance)  independent  -KR     Row Name 11/13/18 0845          Sensory Assessment/Intervention    Sensory General Assessment  no sensation deficits identified  -JARED     Row Name 11/13/18 0845          Pain Assessment    Additional Documentation  Pain Scale: Numbers Pre/Post-Treatment (Group)  -KR     Row Name 11/13/18 0845          Pain Scale: Numbers Pre/Post-Treatment    Pain Scale: Numbers, Pretreatment  0/10 - no pain  -KR     Pain Scale: Numbers, Post-Treatment  0/10 - no pain  -KR     Row Name             Wound 11/08/18 1441 Left chest incision    Wound - Properties Group Date first assessed: 11/08/18  -AL Time first assessed: 1441  -AL Side: Left  -AL Location: chest  -AL Type: incision  -AL    Row Name 11/13/18 0845          Plan of Care Review    Plan of Care Reviewed With  patient  -JARED     [facility] Name 11/13/18 0845          Physical Therapy Clinical Impression    Date of Referral to PT  11/12/18  -KR     PT Diagnosis (PT Clinical Impression)  impaired functional mobility  -KR     Patient/Family Goals Statement (PT Clinical Impression)  return to PLOF  -KR     Criteria for Skilled Interventions Met (PT Clinical Impression)  no;no problems identified which require skilled intervention;current level of function same as previous level of function  -KR     Care Plan Review (PT)  evaluation/treatment results reviewed;patient/other agree to care plan  -KR     Row Name 11/13/18 0845          Vital Signs    Pre Systolic BP Rehab  117  -KR     Pre Treatment Diastolic BP  72  -KR     Pretreatment Heart Rate (beats/min)  91  -KR     Posttreatment Heart Rate (beats/min)  109  -KR     O2 Delivery Pre Treatment  room air  -KR     O2 Delivery Post Treatment  room air  -KR      Pre Patient Position  Supine  -KR     Intra Patient Position  Standing  -KR     Post Patient Position  Supine  -KR     Row Name 11/13/18 0845          Physical Therapy Goals    Bed Mobility Goal Selection (PT)  --  -KR     Transfer Goal Selection (PT)  --  -KR     Gait Training Goal Selection (PT)  --  -KR     Row Name 11/13/18 0845          Positioning and Restraints    Pre-Treatment Position  in bed  -KR     Post Treatment Position  bed  -KR     In Bed  notified nsg;supine;call light within reach;encouraged to call for assist  -KR     Row Name 11/13/18 0845          Living Environment    Home Accessibility  stairs to enter home  -KR       User Key  (r) = Recorded By, (t) = Taken By, (c) = Cosigned By    Initials Name Provider Type    Jeaneth Agustin, RN Registered Nurse    Mary Ayala, PT Physical Therapist          Physical Therapy Education     Title: PT OT SLP Therapies (Done)     Topic: Physical Therapy (Done)     Point: Mobility training (Done)     Learning Progress Summary           Patient Acceptance, E, VU,DU by JARED at 11/13/2018  8:45 AM                   Point: Home exercise program (Done)     Learning Progress Summary           Patient Acceptance, E, VU,DU by JARED at 11/13/2018  8:45 AM                   Point: Body mechanics (Done)     Learning Progress Summary           Patient Acceptance, E, VU,DU by JARED at 11/13/2018  8:45 AM                   Point: Precautions (Done)     Learning Progress Summary           Patient Acceptance, E, VU,DU by JARED at 11/13/2018  8:45 AM                               User Key     Initials Effective Dates Name Provider Type Discipline    JARED 04/03/18 -  Mary Gil, PT Physical Therapist PT                PT Recommendation and Plan  Anticipated Discharge Disposition (PT): home with assist  Outcome Summary/Treatment Plan (PT)  Anticipated Discharge Disposition (PT): home with assist  Plan of Care Reviewed With: patient  Outcome Summary: PT initial evaluation  completed for pt s/p bronchoscopy and VATS. Pt ambulated 500ft with supervision demonstrating appropriate gait mechanics and no LOB. At this time pt does not present with any deficits which require PT skilled care. Recommend D/C home with assistance.     Outcome Measures     Row Name 11/13/18 0845             How much help from another person do you currently need...    Turning from your back to your side while in flat bed without using bedrails?  4  -KR      Moving from lying on back to sitting on the side of a flat bed without bedrails?  4  -KR      Moving to and from a bed to a chair (including a wheelchair)?  4  -KR      Standing up from a chair using your arms (e.g., wheelchair, bedside chair)?  4  -KR      Climbing 3-5 steps with a railing?  3  -KR      To walk in hospital room?  4  -KR      AM-PAC 6 Clicks Score  23  -KR         Functional Assessment    Outcome Measure Options  AM-PAC 6 Clicks Basic Mobility (PT)  -KR        User Key  (r) = Recorded By, (t) = Taken By, (c) = Cosigned By    Initials Name Provider Type    Mary Ayala PT Physical Therapist           Time Calculation:   PT Charges     Row Name 11/13/18 0845             Time Calculation    Start Time  0845  -KR      PT Received On  11/13/18  -KR        User Key  (r) = Recorded By, (t) = Taken By, (c) = Cosigned By    Initials Name Provider Type    Mary Ayala PT Physical Therapist        Therapy Suggested Charges     Code   Minutes Charges    None           Therapy Charges for Today     Code Description Service Date Service Provider Modifiers Qty    48229905965 HC PT EVAL LOW COMPLEXITY 4 11/13/2018 Mary Gil PT GP 1          PT G-Codes  Outcome Measure Options: AM-PAC 6 Clicks Basic Mobility (PT)  AM-PAC 6 Clicks Score: 23    PT Discharge Summary  Anticipated Discharge Disposition (PT): home with assist  Reason for Discharge: At baseline function(Pt does not present with deficits which require PT skilled care.)  Outcomes  Achieved: Refer to plan of care for updates on goals achieved    Gemma Gil, PT  11/13/2018

## 2018-11-13 NOTE — PLAN OF CARE
Problem: Patient Care Overview  Goal: Plan of Care Review  Outcome: Ongoing (interventions implemented as appropriate)   11/13/18 8179   Coping/Psychosocial   Plan of Care Reviewed With patient   OTHER   Outcome Summary PT initial evaluation completed for pt s/p bronchoscopy and VATS. Pt ambulated 500ft with supervision demonstrating appropriate gait mechanics and no LOB. At this time pt does not present with any deficits which require PT skilled care. Recommend D/C home with assistance.

## 2018-11-13 NOTE — PROGRESS NOTES
CTS Progress Note      POD 5 s/p Left VATS with wedge resection     LOS: 5 days     Subjective  No acute events overnight.  Afebrile overnight.  VSS.  On room air.    Objective    Vital Signs  Temp:  [97.9 °F (36.6 °C)-98.2 °F (36.8 °C)] 98.1 °F (36.7 °C)  Heart Rate:  [] 65  Resp:  [16-18] 16  BP: (100-132)/(69-86) 117/72    Physical Exam:   General Appearance: alert, appears stated age and cooperative   Lungs: clear to auscultation     Heart: regular rhythm & normal rate, normal S1, S2 and no murmur, no gallop, no rub   Skin: Incision c/d/i     Results   Results from last 7 days   Lab Units  11/11/18   0318   WBC 10*3/mm3  4.04   HEMOGLOBIN g/dL  11.4*   HEMATOCRIT %  35.5   PLATELETS 10*3/mm3  174     Results from last 7 days   Lab Units  11/11/18   0318   SODIUM mmol/L  136   POTASSIUM mmol/L  4.0   CHLORIDE mmol/L  102   CO2 mmol/L  29.0   BUN mg/dL  6*   CREATININE mg/dL  0.41*   GLUCOSE mg/dL  91   CALCIUM mg/dL  8.5*       Imaging Results (last 24 hours)     Procedure Component Value Units Date/Time    XR Chest 1 View [[id number]] Collected:  11/12/18 0915     Updated:  11/12/18 1107    Narrative:       EXAMINATION: XR CHEST 1 VW- 11/12/2018     INDICATION: PNEUMOTHORAX      COMPARISON: 11/11/2018     FINDINGS: Portable chest reveals minimal pneumomediastinum. The chest  tube on the left has been removed. There is increased markings seen in  the lung bases bilaterally with small bilateral pleural effusions  unchanged in the interval. Degenerative changes seen within the spine.  No new focal parenchymal opacification present.           Impression:       There is a small amount of pneumomediastinum identified with  removal of the left chest tube and no definite pneumothorax. No change  in the mild increased markings at the lung bases bilaterally or low lung  volumes bilaterally.     D:  11/12/2018  E:  11/12/2018     This report was finalized on 11/12/2018 11:05 AM by Dr. Josefina Quinones MD.              Assessment    Pulmonary infiltrates, nodular (S/P Surgical lung biopsy)    Crohn disease (CMS/HCC)    Positive QuantiFERON-TB Gold test      Plan   CXR-pending  Awaiting final path  Ambulate  Pulm tocecet    Krystle Saldana PA-C  11/13/18  7:58 AM     Stable postoperative course to this point.  Pathology report is still pending.  Patient ambulating with adequate O2 sats on room air.  Chest tubes are out. CXR is clear.  Patient can be discharged home today. I have reviewed, verified, and confirmed the above history and current status.  I have examined the patient and confirmed the above physical findings.Above plan and treatment regimen discussed in detail with patient.  Options of treatment, attendant risks vs benefits, and my recommendations were discussed and all questions answered.    Mark Walker MD  [facility]  11/13/18   10:33 AM

## 2018-11-14 ENCOUNTER — READMISSION MANAGEMENT (OUTPATIENT)
Dept: CALL CENTER | Facility: HOSPITAL | Age: 43
End: 2018-11-14

## 2018-11-14 LAB
H CAPSUL AG SER IA-MCNC: <0.5 NG/ML
Lab: NORMAL
MVISTA(R) BLASTOMYCES AG: NORMAL NG/ML
SPECIMEN SOURCE: NORMAL

## 2018-11-15 ENCOUNTER — READMISSION MANAGEMENT (OUTPATIENT)
Dept: CALL CENTER | Facility: HOSPITAL | Age: 43
End: 2018-11-15

## 2018-11-15 LAB
A FLAVUS AB SER QL ID: NEGATIVE
A FUMIGATUS AB SER QL ID: NEGATIVE
A NIGER AB SER QL ID: NEGATIVE
B DERMAT AB TITR SER: NEGATIVE {TITER}
BACTERIA SPEC ANAEROBE CULT: NORMAL
BACTERIA SPEC ANAEROBE CULT: NORMAL
H CAPSUL AB TITR SER ID: NEGATIVE {TITER}

## 2018-11-15 NOTE — OUTREACH NOTE
CT Surgery Week 1 Survey      Responses   Facility patient discharged from?  [facility]   Does the patient have one of the following disease processes/diagnoses(primary or secondary)?  Cardiothoracic surgery   Is there a successful TCM telephone encounter documented?  No   Week 1 attempt successful?  No   Unsuccessful attempts  Attempt 1            Gabriela Baugh RN

## 2018-11-15 NOTE — OUTREACH NOTE
Prep Survey      Responses   Facility patient discharged from?  [facility]   Is patient eligible?  Yes   Discharge diagnosis  Left VATS with wedge resection   Does the patient have one of the following disease processes/diagnoses(primary or secondary)?  Cardiothoracic surgery   Does the patient have Home health ordered?  No   Is there a DME ordered?  No   General alerts for this patient  Do not lift / push / pull more than 20 lbs.   Prep survey completed?  Yes          Tonya Pratt RN

## 2018-11-16 LAB
DX PRELIMINARY: NORMAL
LAB AP CASE REPORT: NORMAL
LAB AP CLINICAL INFORMATION: NORMAL
LAB AP DIAGNOSIS COMMENT: NORMAL
PATH REPORT.FINAL DX SPEC: NORMAL
PATH REPORT.GROSS SPEC: NORMAL

## 2018-11-19 ENCOUNTER — READMISSION MANAGEMENT (OUTPATIENT)
Dept: CALL CENTER | Facility: HOSPITAL | Age: 43
End: 2018-11-19

## 2018-11-19 NOTE — OUTREACH NOTE
CT Surgery Week 1 Survey      Responses   Facility patient discharged from?  [facility]   Does the patient have one of the following disease processes/diagnoses(primary or secondary)?  Cardiothoracic surgery   Is there a successful TCM telephone encounter documented?  No   Week 1 attempt successful?  Yes   Call start time  1245   Call end time  1247   General alerts for this patient  Do not lift / push / pull more than 20 lbs.   Discharge diagnosis  Left VATS with wedge resection   Meds reviewed with patient/caregiver?  Yes   Is the patient having any side effects they believe may be caused by any medication additions or changes?  No   Does the patient have all medications related to this admission filled (includes all antibiotics, pain medications, cardiac medications, etc.)  Yes   Is the patient taking all medications as directed (includes completed medication regime)?  Yes   Does the patient have a primary care provider?   Yes   Does the patient have an appointment scheduled with their C/T surgeon?  Yes   Has the patient kept scheduled appointments due by today?  Yes   Comments  Reviewed appts.   Has home health visited the patient within 72 hours of discharge?  N/A   Psychosocial issues?  No   Did the patient receive a copy of their discharge instructions?  Yes   Nursing interventions  Reviewed instructions with patient   What is the patient's perception of their health status since discharge?  Improving   Nursing interventions  Nurse provided patient education   Is the patient/caregiver able to teach back normal signs of recovery?  Nausea and lack of appetite   Nursing interventions  Reassured on normal signs of recovery   Is the patient /caregiver able to teach back basic post-op care?  Keep incision areas clean, dry and protected, Lifting as instructed by MD in discharge instructions, Drive as instructed by MD in discharge instructions   Is the patient/caregiver able to teach back signs and symptoms of  incisional infection?  Increased redness, swelling or pain at the incisonal site, Increased drainage or bleeding   Is the patient/caregiver able to teach back the hierarchy of who to call/visit for symptoms/problems? PCP, Specialist, Home health nurse, Urgent Care, ED, 911  Yes   Week 1 call completed?  Yes            Ajith Antoine RN

## 2018-11-20 NOTE — DISCHARGE SUMMARY
CTS Discharge Summary    Patient Care Team:  Shaun Taylor MD as PCP - General  Shaun Taylor MD as PCP - Family Medicine  Consults:   Consults     No orders found from 10/10/2018 to 11/9/2018.          Date of Admission: 11/8/2018 10:51 AM  Date of Discharge:  11/13/2018    Discharge Diagnosis  Past Medical History:   Diagnosis Date   • Anxiety    • Arthritis    • BRCA gene positive    • Constipation    • Crohn's disease (CMS/HCC)    • Diarrhea    • Fatty liver    • GERD (gastroesophageal reflux disease)    • History of transfusion    • Hypertension    • Short of breath on exertion    • TB (tuberculosis) 08/2018    quantiferon gold TB POSITIVE AUG 2018 - 3 NEG [employer] CULTURES DONE  - NO ISOLATION REQUIRED   • Wears glasses S     Pulmonary infiltrates [R91.8]  Lung nodule [R91.1]     Procedures Performed  Procedure(s):  BRONCHOSCOPY  LEFT THORACOSCOPY VIDEO ASSISTED WITH A LUNG BIOPSY       Operative Pathology:  Final Diagnosis   1. LUNG, LEFT UPPER LOBE, WEDGE EXCISION:  Extensive cellular interstitial pneumonia with scattered poorly formed granulomas and large nodules of organizing pneumonia.  (See comment)  2. LUNG, LEFT LOWER LOBE, WEDGE EXCISION:  Extensive cellular interstitial pneumonia with scattered poorly formed granulomas and large nodules of organizing pneumonia.  (See comment)         History of Present Illness  Patient is a 43 y.o. female with a history of hypertension, tobacco abuse and Crohn's disease who presents with lung nodules.  The patient had a normal chest x-ray last June in order to start Remicade for her Crohn's disease.  She has been transitioned from Remicade to Cimzia and a quantiferon gold TB test was positive.  Subsequent imaging has revealed lung nodules and consolidation.  She does note allergies with sinus drainage.  The patient denies cough, hemoptysis, weight loss, lymphadenopathy, fevers or chills.  She does have night sweats.      Hospital Course  Patient was taken to the  operating room on 11/8/18 for bronchoscopy, left VATS with left upper and lower lobe wedge resections.  Patient was extubated in the operating room and transported to recovery in stable condition.  POD 1: Awaiting telemetry.  Gross discontinued.  POD 2:  Pain management an issue.  POD 3:  Chest tubes removed.  Patient transferred to telemetry.  Febrile overnight.  POD 4:  Aggressive pulmonary toilet  POD 5:  Patient met discharge criteria and was discharged to home      Discharge Medications     Discharge Medications      New Medications      Instructions Start Date   oxyCODONE-acetaminophen 7.5-325 MG per tablet  Commonly known as:  PERCOCET   1 tablet, Oral, Every 6 Hours PRN         Continue These Medications      Instructions Start Date   CLARITIN 10 MG tablet  Generic drug:  loratadine   10 mg, Oral, Daily PRN      escitalopram 10 MG tablet  Commonly known as:  LEXAPRO   10 mg, Oral, Daily      estradiol 0.025 MG/24HR patch  Commonly known as:  VIVELLE-DOT   estradiol 0.025 mg/24 hr semiweekly transdermal patch   Apply 1 patch twice a week by transdermal route.      ibuprofen 400 MG tablet  Commonly known as:  ADVIL,MOTRIN   400 mg, Oral, Every 6 Hours PRN      metoprolol succinate XL 25 MG 24 hr tablet  Commonly known as:  TOPROL-XL   25 mg, Oral, Daily      predniSONE 5 MG tablet  Commonly known as:  DELTASONE   5 mg, Oral, Daily      progesterone 100 MG capsule  Commonly known as:  PROMETRIUM   100 mg, Oral, Daily      REMICADE IV   1 Bag, Intravenous, Take As Directed, TWICE MONTHLY              Discharge Diet:   Diet Instructions     Diet: Cardiac      Discharge Diet:  Cardiac          Activity at Discharge:   Activity Instructions     Discharge Activity Restrictions      1) No driving for 3 weeks and no longer taking narcotics.   2) Do not lift / push / pull more than 20 lbs.          Follow-up Appointments  Future Appointments   Date Time Provider Department Center   12/4/2018  8:30 AM MAAME YATES  [facility], PFT LAB 3 [facility] None   12/4/2018  9:00 AM Christy Herrera MD [facility] None   12/18/2018 11:45 AM Zenon Ku PA [facility] None           Krystle Saldana PA-C  11/20/18  5:02 PM

## 2018-11-27 ENCOUNTER — READMISSION MANAGEMENT (OUTPATIENT)
Dept: CALL CENTER | Facility: HOSPITAL | Age: 43
End: 2018-11-27

## 2018-11-27 NOTE — OUTREACH NOTE
CT Surgery Week 2 Survey      Responses   Facility patient discharged from?  [facility]   Does the patient have one of the following disease processes/diagnoses(primary or secondary)?  Cardiothoracic surgery   Week 2 attempt successful?  Yes   Call start time  1235   Call end time  1240   Discharge diagnosis  Left VATS with wedge resection   Meds reviewed with patient/caregiver?  Yes   Is the patient taking all medications as directed (includes completed medication regime)?  Yes   Has the patient kept scheduled appointments due by today?  Yes   What is the patient's perception of their health status since discharge?  Improving   Nursing interventions  Nurse provided patient education   Is the patient /caregiver able to teach back basic post-op care?  Take showers only when approved by MD-sponge bathjohn until then, Drive as instructed by MD in discharge instructions, Keep incision areas clean, dry and protected, Lifting as instructed by MD in discharge instructions   Is the patient/caregiver able to teach back signs and symptoms of incisional infection?  Increased redness, swelling or pain at the incisonal site, Increased drainage or bleeding, Incisional warmth, Fever   Is the patient/caregiver able to teach back steps to recovery at home?  Rest and rebuild strength, gradually increase activity, Eat a well-balance diet, Practice good oral hygiene   Additional teach back comments  Pt says she is still having problems breathing, especially with cold weather. She saw PA yesterday about incision. She thought it looked infected. PA said it looked good. Reviewed S/S of infection   Week 2 call completed?  Yes          Skylar España RN

## 2018-12-14 DIAGNOSIS — R91.1 LUNG NODULE: Primary | ICD-10-CM

## 2018-12-18 ENCOUNTER — HOSPITAL ENCOUNTER (OUTPATIENT)
Dept: GENERAL RADIOLOGY | Facility: HOSPITAL | Age: 43
Discharge: HOME | End: 2018-12-18
Admitting: PHYSICIAN ASSISTANT

## 2018-12-18 ENCOUNTER — OFFICE VISIT (OUTPATIENT)
Dept: CARDIAC SURGERY | Facility: CLINIC | Age: 43
End: 2018-12-18

## 2018-12-18 VITALS
TEMPERATURE: 98 F | DIASTOLIC BLOOD PRESSURE: 102 MMHG | BODY MASS INDEX: 35.27 KG/M2 | SYSTOLIC BLOOD PRESSURE: 146 MMHG | WEIGHT: 206.6 LBS | OXYGEN SATURATION: 98 % | HEIGHT: 64 IN | HEART RATE: 112 BPM

## 2018-12-18 DIAGNOSIS — R91.1 LUNG NODULE: Primary | ICD-10-CM

## 2018-12-18 DIAGNOSIS — R91.1 LUNG NODULE: ICD-10-CM

## 2018-12-18 PROCEDURE — 71046 X-RAY EXAM CHEST 2 VIEWS: CPT

## 2018-12-18 PROCEDURE — 99213 OFFICE O/P EST LOW 20 MIN: CPT | Performed by: PHYSICIAN ASSISTANT

## 2018-12-18 NOTE — PROGRESS NOTES
12/18/2018  Patient Information  Gloria Smith                                                                                          [address] [facility] [address]   1975  'PCP/Referring Physician'  Shaun Taylor MD  [phone]  No ref. 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  The patient denies any incisional pain, shortness of breath, difficulty with ambulation, hemoptysis or weight loss.  The patient further denies any fever, chills, nausea, vomiting or night sweats.  She is followed closely by her pulmonologist, Dr. Herrera.  The patient is otherwise doing well.    Patient Active Problem List   Diagnosis   • Crohn disease (CMS/HCC)   • Hypertension   • Anemia, popst-op   • Positive QuantiFERON-TB Gold test   • Pulmonary infiltrates, nodular (S/P Surgical lung biopsy)   • Lung nodule     Past Medical History:   Diagnosis Date   • Anxiety    • Arthritis    • BRCA gene positive    • Constipation    • Crohn's disease (CMS/HCC)    • Diarrhea    • Fatty liver    • GERD (gastroesophageal reflux disease)    • History of transfusion    • Hypertension    • Short of breath on exertion    • TB (tuberculosis) 08/2018    quantiferon gold TB POSITIVE AUG 2018 - 3 NEG AFB CULTURES DONE  - NO ISOLATION REQUIRED   • Wears glasses S     Past Surgical History:   Procedure Laterality Date   • ANKLE SURGERY Left     orif, fracture, later removal hardware   • APPENDECTOMY     • BILATERAL BREAST REDUCTION     • BREAST BIOPSY Left    • CHOLECYSTECTOMY     • COLONOSCOPY      5-2017   • ENDOSCOPY     • EXPLORATORY LAPAROTOMY   1997   • OOPHORECTOMY Bilateral    • TONSILLECTOMY     • WISDOM TOOTH EXTRACTION         Current Outpatient Medications:   •  escitalopram (LEXAPRO) 10 MG tablet, Take 10 mg by mouth Daily., Disp: , Rfl:   •  estradiol (VIVELLE-DOT) 0.025 MG/24HR patch, estradiol 0.025 mg/24 hr semiweekly transdermal patch  Apply 1 patch twice a week by transdermal route., Disp: , Rfl:   •  ibuprofen (ADVIL,MOTRIN) 400 MG tablet, Take 400 mg by mouth Every 6 (Six) Hours As Needed for Mild Pain ., Disp: , Rfl:   •  InFLIXimab (REMICADE IV), Infuse 1 Bag into a venous catheter Take As Directed. TWICE MONTHLY, Disp: , Rfl:   •  loratadine (CLARITIN) 10 MG tablet, Take 10 mg by mouth Daily As Needed., Disp: , Rfl:   •  metoprolol succinate XL (TOPROL-XL) 25 MG 24 hr tablet, Take 25 mg by mouth Daily., Disp: , Rfl:   •  predniSONE (DELTASONE) 5 MG tablet, Take 5 mg by mouth Daily., Disp: , Rfl:   •  progesterone (PROMETRIUM) 100 MG capsule, Take 100 mg by mouth Daily., Disp: , Rfl:   •  oxyCODONE-acetaminophen (PERCOCET) 7.5-325 MG per tablet, Take 1 tablet by mouth Every 6 (Six) Hours As Needed for Severe Pain ., Disp: 10 tablet, Rfl: 0  No current facility-administered medications for this visit.     Facility-Administered Medications Ordered in Other Visits:   •  Chlorhexidine Gluconate Cloth 2 % pads 1 application, 1 application, Topical, Q12H PRN, Antony Horton PA  No Known Allergies  Social History     Socioeconomic History   • Marital status:      Spouse name: Not on file   • Number of children: 0   • Years of education: Not on file   • Highest education level: Not on file   Social Needs   • Financial resource strain: Not on file   • Food insecurity - worry: Not on file   • Food insecurity - inability: Not on file   • Transportation needs - medical: Not on file   • Transportation needs - non-medical: Not on file   Occupational History   • Occupation: dental hygenist   Tobacco Use   • Smoking status: Former Smoker      "Packs/day: 1.00     Years: 27.00     Pack years: 27.00     Types: Cigarettes     Last attempt to quit: 2018     Years since quittin.4   • Smokeless tobacco: Never Used   Substance and Sexual Activity   • Alcohol use: Yes     Alcohol/week: 1.8 oz     Types: 3 Glasses of wine per week     Comment: 3 GLASSES OF WINE 3 DAYS PER WEEK   • Drug use: No   • Sexual activity: Defer   Other Topics Concern   • Not on file   Social History Narrative    [place] spouseAjith     Family History   Problem Relation Age of Onset   • Breast cancer Mother    • Hypertension Father    • Breast cancer Maternal Grandmother    • Ovarian cancer Maternal Grandmother      ROS: As per HPI, otherwise negative.   Vitals:    18 1133   BP: (!) 146/102   Pulse: 112   Temp: 98 °F (36.7 °C)   TempSrc: Temporal   SpO2: 98%   Weight: 93.7 kg (206 lb 9.6 oz)   Height: 162.6 cm (64\")      Physical Exam:  Gen - NAD, pleasant, cooperative  CV - Regular rate and rhythm, no murmur gallop or rub  Pulm - Lungs clear to auscultation without wheeze or rhonchi   GI - Soft, normoactive bowel sounds, non-tender  Ext - Without edema   Incision - Well healed, no evidence of incisional dehiscence or cellulitis  Lymph - Without palpable axillary, supraclavicular or anterior chain lymphadenopathy present   Neuro - CN II - XII grossly intact, tongue midline, voice normal    Labs/Imagin18 CXR  COMPARISON: Chest x-ray 2018     FINDINGS: Minimal reduction in opacifications left lung base likely  resolution of atelectasis and/or scarring with persistent opacities  primarily linear in the midlungs and right perihilar region likely  persistent atelectasis and scarring. No pneumothorax or significant  effusion. Cardiac silhouette within normal limits and unchanged.  Degenerative changes of the spine.         IMPRESSION:  Improved aeration at the lung bases greatest on the left  likely resolved atelectasis and/or airspace disease with " persistent  midlung scarring and atelectasis. No new parenchymal disease or  effusion.    Assessment/Plan:  Patient is a 43-year-old  female with history of Crohn's disease, hypertension, positive quantiferon-TB gold test, pulmonary infiltrates and lung nodule who underwent left VATS with left upper and left lower wedge resection performed 11/8/18.  The patient is having a steady postoperative course.  I reviewed the patient's final pathology, and discussed all lab results with her, answering all of her questions to her satisfaction.  There is nothing to do from a surgical standpoint, but we will refer her to Dr. Mcfarland with infectious disease for continued evaluation and workup.  I would like for the patient to undergo a CT scan of the chest in 3 months, and then return to this office for review and discussion of the results.  If the patient has any questions, concerns or acutely worsening symptoms, she may call our office or present to the nearest emergency department immediately.    Patient Active Problem List   Diagnosis   • Crohn disease (CMS/HCC)   • Hypertension   • Anemia, popst-op   • Positive QuantiFERON-TB Gold test   • Pulmonary infiltrates, nodular (S/P Surgical lung biopsy)   • Lung nodule

## 2019-01-04 ENCOUNTER — HOSPITAL ENCOUNTER (OUTPATIENT)
Dept: GENERAL RADIOLOGY | Facility: HOSPITAL | Age: 44
Discharge: HOME | End: 2019-01-04
Attending: INTERNAL MEDICINE | Admitting: INTERNAL MEDICINE

## 2019-01-04 ENCOUNTER — TRANSCRIBE ORDERS (OUTPATIENT)
Dept: ADMINISTRATIVE | Facility: HOSPITAL | Age: 44
End: 2019-01-04

## 2019-01-04 DIAGNOSIS — R05.9 COUGH: Primary | ICD-10-CM

## 2019-01-04 DIAGNOSIS — R05.9 COUGH: ICD-10-CM

## 2019-01-04 PROCEDURE — 71046 X-RAY EXAM CHEST 2 VIEWS: CPT

## 2019-02-01 ENCOUNTER — OFFICE VISIT (OUTPATIENT)
Dept: PULMONOLOGY | Facility: CLINIC | Age: 44
End: 2019-02-01

## 2019-02-01 VITALS
HEIGHT: 64 IN | OXYGEN SATURATION: 97 % | SYSTOLIC BLOOD PRESSURE: 130 MMHG | DIASTOLIC BLOOD PRESSURE: 90 MMHG | BODY MASS INDEX: 35.82 KG/M2 | HEART RATE: 96 BPM | WEIGHT: 209.8 LBS | TEMPERATURE: 98.4 F

## 2019-02-01 DIAGNOSIS — R06.02 SOB (SHORTNESS OF BREATH): Primary | ICD-10-CM

## 2019-02-01 DIAGNOSIS — R91.8 PULMONARY INFILTRATES: ICD-10-CM

## 2019-02-01 DIAGNOSIS — K50.018 CROHN'S DISEASE OF SMALL INTESTINE WITH OTHER COMPLICATION (HCC): ICD-10-CM

## 2019-02-01 PROCEDURE — 94726 PLETHYSMOGRAPHY LUNG VOLUMES: CPT | Performed by: INTERNAL MEDICINE

## 2019-02-01 PROCEDURE — 94375 RESPIRATORY FLOW VOLUME LOOP: CPT | Performed by: INTERNAL MEDICINE

## 2019-02-01 PROCEDURE — 94729 DIFFUSING CAPACITY: CPT | Performed by: INTERNAL MEDICINE

## 2019-02-01 PROCEDURE — 99214 OFFICE O/P EST MOD 30 MIN: CPT | Performed by: INTERNAL MEDICINE

## 2019-02-01 RX ORDER — PREDNISONE 10 MG/1
TABLET ORAL
Qty: 31 TABLET | Refills: 0 | Status: SHIPPED | OUTPATIENT
Start: 2019-02-01

## 2019-02-01 NOTE — PROGRESS NOTES
Gloria Smith is a 43 y.o. female here for evaluation of   Chief Complaint   Patient presents with   • Follow-up       Problem list:  1. Positive QuantiFERON-TB Gold test  2. Bilateral nodular infiltrates  3. Open lung biopsy, December 2018  4. Crohn's disease, Remicade  5. Skin rash  6. Hypertension  7. Diarrhea  8. GERD  9. BRCA gene positive  10. Open reduction internal fixation left ankle fracture with subsequent hardware removal  11. Appendectomy, ileocecolectomy, bilateral oophorectomy, November 2017  12. Exploratory laparotomy, 1997  13. Colonoscopy, May 2017  14. Cholecystectomy  15. Bilateral breast reduction  16. 0.25 pack per day for 27 years, quit July 2018  17. No known drug allergies      History of Present Illness    Complex 43-year-old woman with a history of Crohn's disease with associated arthralgias taking Remicade every 8 weeks. She underwent a QuantiFERON Gold TB test that was positive. She had a negative PPD in June 2017 and in 2014. Chest x-ray revealed bilateral nodules and this was confirmed by CT scan. Despite a significantly abnormal CT scan she really had no respiratory symptoms. She does work as a dental hygienist. She had traveled to [place]. Bronchoscopy with lavage was performed and cultures were negative. Fungal serologies and urinary antigens were done and were negative. Ultimately she underwent an open lung biopsy November 8, 2018. Surgeon commented in his note there was a yellowish discoloration to the visceral pleura and an area of yellowish parenchyma. Wedge biopsy was performed on the left upper lobe. She recovered well from surgery and healed nicely. She was evaluated by infectious disease and they did start her on antituberculous medications. Right hand and caused a diffuse rash from head to toe. For the last 3 weeks she been taking isoniazid, pyrazinamide, vitamin B6. She stopped this medication 3 days ago. She was having diffuse itching red rash over her back and a  raised rash over her lower extremities. She was evaluated by dermatology who did a punch biopsy of her right leg. She got her last dose of Remicade yesterday. She denies fever chills night sweats or weight loss.    Review of Systems   Constitutional: Positive for fatigue.   HENT: Positive for dental problem.    Respiratory: Positive for cough and shortness of breath.    Gastrointestinal: Positive for diarrhea.   Musculoskeletal: Positive for arthralgias and joint swelling.   Skin: Positive for color change and rash.         Current Outpatient Medications:   •  escitalopram (LEXAPRO) 10 MG tablet, Take 10 mg by mouth Daily., Disp: , Rfl:   •  estradiol (VIVELLE-DOT) 0.025 MG/24HR patch, estradiol 0.025 mg/24 hr semiweekly transdermal patch  Apply 1 patch twice a week by transdermal route., Disp: , Rfl:   •  ibuprofen (ADVIL,MOTRIN) 400 MG tablet, Take 400 mg by mouth Every 6 (Six) Hours As Needed for Mild Pain ., Disp: , Rfl:   •  InFLIXimab (REMICADE IV), Infuse 1 Bag into a venous catheter Take As Directed. TWICE MONTHLY, Disp: , Rfl:   •  loratadine (CLARITIN) 10 MG tablet, Take 10 mg by mouth Daily As Needed., Disp: , Rfl:   •  metoprolol succinate XL (TOPROL-XL) 25 MG 24 hr tablet, Take 25 mg by mouth Daily., Disp: , Rfl:   •  oxyCODONE-acetaminophen (PERCOCET) 7.5-325 MG per tablet, Take 1 tablet by mouth Every 6 (Six) Hours As Needed for Severe Pain ., Disp: 10 tablet, Rfl: 0  •  predniSONE (DELTASONE) 5 MG tablet, Take 5 mg by mouth Daily., Disp: , Rfl:   •  progesterone (PROMETRIUM) 100 MG capsule, Take 100 mg by mouth Daily., Disp: , Rfl:   •  predniSONE (DELTASONE) 10 MG tablet, Take 4 tabs daily x 3 days, then take 3 tabs daily x 3 days, then take 2 tabs daily x 3 days, then take 1 tab daily x 3 days, Disp: 31 tablet, Rfl: 0  No current facility-administered medications for this visit.     Facility-Administered Medications Ordered in Other Visits:   •  Chlorhexidine Gluconate Cloth 2 % pads 1 application,  1 application, Topical, Q12H ANTOINE, Antony Horton PA    Past Medical History:   Diagnosis Date   • Anxiety    • Arthritis    • BRCA gene positive    • Constipation    • Crohn's disease (CMS/HCC)    • Diarrhea    • Fatty liver    • GERD (gastroesophageal reflux disease)    • History of transfusion    • Hypertension    • Short of breath on exertion    • TB (tuberculosis) 2018    quantiferon gold TB POSITIVE AUG 2018 - 3 NEG AFB CULTURES DONE  - NO ISOLATION REQUIRED   • Wears glasses S     Past Surgical History:   Procedure Laterality Date   • ANKLE SURGERY Left     orif, fracture, later removal hardware   • APPENDECTOMY     • BILATERAL BREAST REDUCTION     • BREAST BIOPSY Left    • BRONCHOSCOPY N/A 2018    Procedure: BRONCHOSCOPY;  Surgeon: Christy Herrera MD;  Location:  [facility];  Service: Pulmonary   • BRONCHOSCOPY N/A 2018    Procedure: BRONCHOSCOPY;  Surgeon: Shaun Oates MD;  Location:  [facility];  Service: Cardiothoracic   • CHOLECYSTECTOMY     • COLON RESECTION Bilateral 2017    Procedure: ILEOCECOTOMY, BILATERAL SALPINGO OOPHERECTOMY;  Surgeon: Ajith Mcduffie MD;  Location:  [facility];  Service:    • COLONOSCOPY         • ENDOSCOPY     • EXPLORATORY LAPAROTOMY     • OOPHORECTOMY Bilateral    • THORACOSCOPY Left 2018    Procedure: LEFT THORACOSCOPY VIDEO ASSISTED WITH A LUNG BIOPSY;  Surgeon: Shaun Oates MD;  Location:  [facility];  Service: Cardiothoracic   • TONSILLECTOMY     • WISDOM TOOTH EXTRACTION       Social History     Socioeconomic History   • Marital status:      Spouse name: Not on file   • Number of children: 0   • Years of education: Not on file   • Highest education level: Not on file   Occupational History   • Occupation: dental hygenist   Tobacco Use   • Smoking status: Former Smoker     Packs/day: 1.00     Years: 27.00     Pack years: 27.00     Types: Cigarettes     Last attempt to quit: 2018     Years since quittin.5   •  "Smokeless tobacco: Never Used   Substance and Sexual Activity   • Alcohol use: Yes     Alcohol/week: 1.8 oz     Types: 3 Glasses of wine per week     Comment: 3 GLASSES OF WINE 3 DAYS PER WEEK   • Drug use: No   • Sexual activity: Defer   Social History Narrative    Lives in [place] with spouse, Ajith     Family History   Problem Relation Age of Onset   • Breast cancer Mother    • Hypertension Father    • Breast cancer Maternal Grandmother    • Ovarian cancer Maternal Grandmother      Blood pressure 130/90, pulse 96, temperature 98.4 °F (36.9 °C), height 162.6 cm (64\"), weight 95.2 kg (209 lb 12.8 oz), last menstrual period 09/06/2017, SpO2 97 %, not currently breastfeeding.    Physical Exam   Constitutional: She is oriented to person, place, and time. She appears well-developed and well-nourished. No distress.   HENT:   Head: Normocephalic and atraumatic.   Mouth/Throat: Oropharynx is clear and moist. No oropharyngeal exudate.   Eyes: Pupils are equal, round, and reactive to light. No scleral icterus.   Neck: No tracheal deviation present. No thyromegaly present.   Cardiovascular: Normal rate, regular rhythm and normal heart sounds.   No murmur heard.  Pulmonary/Chest: Effort normal and breath sounds normal. No respiratory distress. She has no wheezes.   Abdominal: Soft. Bowel sounds are normal. She exhibits no distension.   Musculoskeletal: She exhibits no edema.   Lymphadenopathy:     She has no cervical adenopathy.   Neurological: She is oriented to person, place, and time.   Skin: Skin is warm. Rash noted.   Vitiligo under arms.  Hive like rash tibial, diffuse red rash trunk   Psychiatric: She has a normal mood and affect.         PFTs:    Radiology:    Lab:  Final Diagnosis   1. LUNG, LEFT UPPER LOBE, WEDGE EXCISION:  Extensive cellular interstitial pneumonia with scattered poorly formed granulomas and large nodules of organizing pneumonia.  (See comment)  2. LUNG, LEFT LOWER LOBE, WEDGE EXCISION:  Extensive " cellular interstitial pneumonia with scattered poorly formed granulomas and large nodules of organizing pneumonia.  (See comment)  PCC/klb/dlb        This case was submitted to the [facility] in [facility] in consultation. The consultant feels that both biopsies show similar changes.  There is a diffuse cellular interstitial pneumonia with lymphocytes and plasma cells present as well as scattered poorly formed granuloma.  The consultant performed additional staining.  The consultant feels that the differential diagnosis would include pulmonary manifestation of the patient's Crohn's disease, infectious processes due to immunosuppressant medication, or possible adverse drug reactions.  The consultant feels that if the patient has recently undergone a change in immunosuppression this might suggest an adverse drug reaction.  If the patient has current active Crohn's colitis, this could support a flare of underlying pulmonary manifestations of inflammatory bowel disease.  The possibility of infectious process should also be considered and evaluated on clinical grounds.  The consultant feels that IgG4 mediated disease and lymphoproliferative processes have essentially been excluded.  Please see the consultant's letter for additional details    Gloria was seen today for follow-up.    Diagnoses and all orders for this visit:    SOB (shortness of breath)  -     Pulmonary Function Test    Pulmonary infiltrates, nodular (S/P Surgical lung biopsy)    Crohn's disease of small intestine with other complication (CMS/HCC)    Other orders  -     predniSONE (DELTASONE) 10 MG tablet; Take 4 tabs daily x 3 days, then take 3 tabs daily x 3 days, then take 2 tabs daily x 3 days, then take 1 tab daily x 3 days        Discussion:   Very difficult case of a 43-year-old woman with Crohn's disease associated with chronic diarrhea and arthralgias. She now has a positive TB QuantiFERON Gold and bilateral lung nodules. Ultimately  bronchoscopy was nondiagnostic and she underwent an open lung biopsy that reveals poorly formed granulomatous inflammation. Her final AFB and fungal cultures however are negative. She was placed on antituberculous medications and has had skin rash. I am pondering the possibility of Crohn's disease-induced lung disease or a drug-induced lung disease from her Remicade.    Stop antituberculous medications  Prednisone taper for her rash  Referral to the [facility] to review her pathology and clinical situation  Consider serologies for sarcoid, ACE level, though not specific    Christy Herrera MD

## 2019-02-07 DIAGNOSIS — J84.9 INTERSTITIAL LUNG DISEASE (HCC): Primary | ICD-10-CM

## 2019-04-15 ENCOUNTER — TELEPHONE (OUTPATIENT)
Dept: CARDIAC SURGERY | Facility: CLINIC | Age: 44
End: 2019-04-15

## 2019-06-03 ENCOUNTER — TELEPHONE (OUTPATIENT)
Dept: CARDIAC SURGERY | Facility: CLINIC | Age: 44
End: 2019-06-03

## 2019-06-03 NOTE — TELEPHONE ENCOUNTER
PT RETURNED VOICEMAIL FROM [place] FOR RECALL APPT WITH [facility], PT WISHED TO NOT SCHEDULE OR HAVE A TEST DONE. TOLD PT THAT IF SHE WANTED TO R/S WITH OUR OFFICE TO CALL

## 2022-07-07 ENCOUNTER — OFFICE VISIT (OUTPATIENT)
Dept: SLEEP MEDICINE | Facility: HOSPITAL | Age: 47
End: 2022-07-07

## 2022-07-07 VITALS
OXYGEN SATURATION: 98 % | DIASTOLIC BLOOD PRESSURE: 82 MMHG | SYSTOLIC BLOOD PRESSURE: 134 MMHG | BODY MASS INDEX: 36.97 KG/M2 | WEIGHT: 215.4 LBS | HEART RATE: 83 BPM

## 2022-07-07 DIAGNOSIS — R29.818 SUSPECTED SLEEP APNEA: ICD-10-CM

## 2022-07-07 DIAGNOSIS — G47.30 HYPERSOMNIA WITH SLEEP APNEA: ICD-10-CM

## 2022-07-07 DIAGNOSIS — R06.83 SNORING: Primary | ICD-10-CM

## 2022-07-07 DIAGNOSIS — E66.01 MORBID (SEVERE) OBESITY DUE TO EXCESS CALORIES: ICD-10-CM

## 2022-07-07 DIAGNOSIS — G47.10 HYPERSOMNIA WITH SLEEP APNEA: ICD-10-CM

## 2022-07-07 PROCEDURE — 99203 OFFICE O/P NEW LOW 30 MIN: CPT | Performed by: NURSE PRACTITIONER

## 2022-07-07 NOTE — PATIENT INSTRUCTIONS
Screening for Sleep Apnea    Sleep apnea is a condition in which breathing pauses or becomes shallow during sleep. Sleep apnea screening is a test to determine if you are at risk for sleep apnea. The test is easy and only takes a few minutes. Your health care provider may ask you to have this test in preparation for surgery or as part of a physical exam.  What are the symptoms of sleep apnea?  Common symptoms of sleep apnea include:  Snoring.  Restless sleep.  Daytime sleepiness.  Pauses in breathing.  Choking during sleep.  Irritability.  Forgetfulness.  Trouble thinking clearly.  Depression.  Personality changes.  Most people with sleep apnea are not aware that they have it.  Why should I get screened?  Getting screened for sleep apnea can help:  Ensure your safety. It is important for your health care providers to know whether or not you have sleep apnea, especially if you are having surgery or have other long-term (chronic) health conditions.  Improve your health and allow you to get a better night's rest. Restful sleep can help you:  Have more energy.  Lose weight.  Improve high blood pressure.  Improve diabetes management.  Prevent stroke.  Prevent car accidents.  How is screening done?  Screening usually includes being asked a list of questions about your sleep quality. Some questions you may be asked include:  Do you snore?  Is your sleep restless?  Do you have daytime sleepiness?  Has a partner or spouse told you that you stop breathing during sleep?  Have you had trouble concentrating or memory loss?  If your screening test is positive, you are at risk for the condition. Further testing may be needed to confirm a diagnosis of sleep apnea.  Where to find more information  You can find screening tools online or at your health care clinic. For more information about sleep apnea screening and healthy sleep, visit these websites:  Centers for Disease Control and Prevention:  www.cdc.gov/sleep/index.html  American Sleep Apnea Association: www.sleepapnea.org  Contact a health care provider if:  You think that you may have sleep apnea.  Summary  Sleep apnea screening can help determine if you are at risk for sleep apnea.  It is important for your health care providers to know whether or not you have sleep apnea, especially if you are having surgery or have other chronic health conditions.  You may be asked to take a screening test for sleep apnea in preparation for surgery or as part of a physical exam.  This information is not intended to replace advice given to you by your health care provider. Make sure you discuss any questions you have with your health care provider.  Document Revised: 10/04/2019 Document Reviewed: 03/30/2018  Elsevier Patient Education © 2021 [employer]

## 2022-07-07 NOTE — PROGRESS NOTES
Chief Complaint  Sleeping Problem    Subjective     History of Present Illness:  Gloria Smith is a 46 y.o. female who presents for fatigue.  She has arthritis, chronic pain, Crohn's disease, and hypertension.  She has had difficulty with weight gain, insomnia, vivid dreams, and wakes herself at night gasping for breath.  She drinks approximately 3 cups of coffee per day, 2-3 teeth, and 3 Cokes zeros.  She did also smokes quarter pack of cigarettes per day followed by an e-cigarette.  She has a family history of sleep apnea in her father.    Further details are as follows:    [place] Scale is: 10/24    Estimated average amount of sleep per night: 4 hours   Number of times she wakes up at night: 3  Difficulty falling back asleep: yes  It usually takes 30 minutes to go to sleep.  She feels sleepy upon waking up: Yes  Rotating or night shift work: yes    Drowsiness/Sleepiness:  She exhibits the following:  excessive daytime sleepiness  excessive daytime fatigue  takes scheduled naps during the day    Snoring/Breathing:  She exhibits the following:  snores in all sleep positions and awakens with dry mouth    Head Injury:  She exhibits the following:  No    Reflux:  She describes the following:  Intermittent.    RLS/PLMs:  She describes the following:  discomfort in legs with an urge to move them    Insomnia:  She describes the following:  problems initiating sleep at night  frequent awakenings  restless sleep    Parasomnia:  She exhibits the following:  sleep talks    Weight:  Weight change in the last year:  gain: 25 lbs    The patient's relevant past medical, surgical, family, and social history reviewed and updated in Epic as appropriate.    Review of Systems   Constitutional: Positive for activity change, fatigue and unexpected weight gain.   HENT: Positive for sinus pressure.    Eyes: Positive for redness.   Cardiovascular: Positive for palpitations.   Gastrointestinal: Positive for abdominal distention.    Endocrine: Positive for cold intolerance and heat intolerance.   Genitourinary: Positive for frequency.   Musculoskeletal: Positive for joint swelling, neck pain and neck stiffness.   Allergic/Immunologic: Positive for environmental allergies and immunocompromised state.   Neurological: Positive for light-headedness and headache.   Psychiatric/Behavioral: Positive for decreased concentration.       PMH:    Past Medical History:   Diagnosis Date   • Anxiety    • Arthritis    • BRCA gene positive    • Constipation    • Crohn's disease (HCC)    • Diarrhea    • Fatty liver    • GERD (gastroesophageal reflux disease)    • History of transfusion    • Hypertension    • Short of breath on exertion    • TB (tuberculosis) 08/2018    quantiferon gold TB POSITIVE AUG 2018 - 3 NEG [employer] CULTURES DONE  - NO ISOLATION REQUIRED   • Wears glasses S     Past Surgical History:   Procedure Laterality Date   • ANKLE SURGERY Left     orif, fracture, later removal hardware   • APPENDECTOMY     • BILATERAL BREAST REDUCTION     • BREAST BIOPSY Left    • BRONCHOSCOPY N/A 9/7/2018    Procedure: BRONCHOSCOPY;  Surgeon: Christy Herrera MD;  Location:  [facility];  Service: Pulmonary   • BRONCHOSCOPY N/A 11/8/2018    Procedure: BRONCHOSCOPY;  Surgeon: Shaun Oates MD;  Location:  [facility];  Service: Cardiothoracic   • CHOLECYSTECTOMY     • COLON RESECTION Bilateral 11/6/2017    Procedure: ILEOCECOTOMY, BILATERAL SALPINGO OOPHERECTOMY;  Surgeon: Ajith Mcduffie MD;  Location:  [facility];  Service:    • COLONOSCOPY      5-2017   • ENDOSCOPY     • EXPLORATORY LAPAROTOMY  1997   • OOPHORECTOMY Bilateral    • THORACOSCOPY Left 11/8/2018    Procedure: LEFT THORACOSCOPY VIDEO ASSISTED WITH A LUNG BIOPSY;  Surgeon: Shaun Oates MD;  Location:  [facility];  Service: Cardiothoracic   • TONSILLECTOMY     • WISDOM TOOTH EXTRACTION       OB History    No obstetric history on file.       No Known Allergies    MEDS:  Prior to Admission  medications    Medication Sig Start Date End Date Taking? Authorizing Provider   loratadine (CLARITIN) 10 MG tablet Take 10 mg by mouth Daily As Needed.   Yes Michelle Barrett MD   metoprolol succinate XL (TOPROL-XL) 25 MG 24 hr tablet Take 25 mg by mouth Daily.   Yes Michelle Barrett MD   progesterone (PROMETRIUM) 100 MG capsule Take 100 mg by mouth Daily. 8/20/18  Yes Michelle Barrett MD   escitalopram (LEXAPRO) 10 MG tablet Take 10 mg by mouth Daily.    Michelle Barrett MD   estradiol (VIVELLE-DOT) 0.025 MG/24HR patch estradiol 0.025 mg/24 hr semiweekly transdermal patch   Apply 1 patch twice a week by transdermal route.    Michelle Barrett MD   ibuprofen (ADVIL,MOTRIN) 400 MG tablet Take 400 mg by mouth Every 6 (Six) Hours As Needed for Mild Pain .    Michelle Barrett MD   InFLIXimab (REMICADE IV) Infuse 1 Bag. into a venous catheter Take As Directed. TWICE MONTHLY    Michelle Barrett MD   oxyCODONE-acetaminophen (PERCOCET) 7.5-325 MG per tablet Take 1 tablet by mouth Every 6 (Six) Hours As Needed for Severe Pain . 11/13/18   Jeffery Spencer MD   predniSONE (DELTASONE) 10 MG tablet Take 4 tabs daily x 3 days, then take 3 tabs daily x 3 days, then take 2 tabs daily x 3 days, then take 1 tab daily x 3 days 2/1/19   Christy Herrera MD   predniSONE (DELTASONE) 5 MG tablet Take 5 mg by mouth Daily. 8/8/18   Michelle Barrett MD       FH:  Family History   Problem Relation Age of Onset   • Breast cancer Mother    • Hypertension Father    • Breast cancer Maternal Grandmother    • Ovarian cancer Maternal Grandmother        Objective   Vital Signs:  /82 (BP Location: Right arm, Patient Position: Sitting, Cuff Size: Adult)   Pulse 83   Wt 97.7 kg (215 lb 6.4 oz)   SpO2 98%   BMI 36.97 kg/m²     Gloria Smith  reports that she quit smoking about 4 years ago. Her smoking use included cigarettes. She has a 27.00 pack-year smoking history. She has never used smokeless  tobacco.         Physical Exam  Constitutional:       Appearance: Normal appearance.   HENT:      Head: Normocephalic and atraumatic.      Nose: Nose normal.      Mouth/Throat:      Mouth: Mucous membranes are dry.   Cardiovascular:      Rate and Rhythm: Normal rate and regular rhythm.      Heart sounds: No murmur heard.    No friction rub. No gallop.   Pulmonary:      Effort: Pulmonary effort is normal. No respiratory distress.      Breath sounds: Normal breath sounds. No wheezing or rhonchi.   Neurological:      Mental Status: She is alert and oriented to person, place, and time.   Psychiatric:         Behavior: Behavior normal.         Mallampati Score: IV (only hard palate visible)    Result Review :              Assessment and Plan    Pleasant 46-year-old female  Patient has had progressively worsening difficulty with fatigue, frequent awakening, and insomnia.  She has awakened gasping for breath.  She further describes difficulty with leg cramps and frequent need to move her legs in order to alleviate the pain.  Recent labs were obtained which she notes were otherwise normal including iron studies.  Her symptoms are concerning for sleep apnea and we will obtain home sleep study with follow-up following.    Diagnoses and all orders for this visit:    1. Snoring (Primary)  -     Home Sleep Study; Future    2. Suspected sleep apnea  -     Home Sleep Study; Future    3. Hypersomnia with sleep apnea  -     Home Sleep Study; Future    4. Morbid (severe) obesity due to excess calories (HCC)           I discussed the consequences of uncontrolled sleep apnea including hypertension, heart disease, diabetes, stroke, and dementia. I further discussed sleep apnea therapeutic options including CPAP, Weight loss, Oral dental appliance, and surgery.    Follow Up  Return for Follow up after study.  Patient was given instructions and counseling regarding her condition or for health maintenance advice. Please see specific  information pulled into the AVS if appropriate.     RASHAD Flor, [facility]  Pulmonary, Critical Care Medicine, and Sleep Medicine  Electronically signed by RASHAD Swanson, 07/07/22, 8:43 AM EDT.

## 2022-07-08 ENCOUNTER — PATIENT ROUNDING (BHMG ONLY) (OUTPATIENT)
Dept: SLEEP MEDICINE | Facility: CLINIC | Age: 47
End: 2022-07-08

## 2022-07-08 NOTE — PROGRESS NOTES
I am calling to officially welcome you to our practice and ask about your recent visit. If This a good time to talk?- No Voicemail left.

## 2022-07-28 ENCOUNTER — APPOINTMENT (OUTPATIENT)
Dept: SLEEP MEDICINE | Facility: HOSPITAL | Age: 47
End: 2022-07-28

## 2022-08-04 ENCOUNTER — HOSPITAL ENCOUNTER (OUTPATIENT)
Dept: SLEEP MEDICINE | Facility: HOSPITAL | Age: 47
Discharge: HOME | End: 2022-08-04
Admitting: NURSE PRACTITIONER

## 2022-08-04 VITALS — WEIGHT: 215 LBS | HEIGHT: 64 IN | BODY MASS INDEX: 36.7 KG/M2

## 2022-08-04 DIAGNOSIS — R06.83 SNORING: ICD-10-CM

## 2022-08-04 DIAGNOSIS — R29.818 SUSPECTED SLEEP APNEA: ICD-10-CM

## 2022-08-04 DIAGNOSIS — G47.30 HYPERSOMNIA WITH SLEEP APNEA: ICD-10-CM

## 2022-08-04 DIAGNOSIS — G47.10 HYPERSOMNIA WITH SLEEP APNEA: ICD-10-CM

## 2022-08-04 PROCEDURE — 95800 SLP STDY UNATTENDED: CPT | Performed by: INTERNAL MEDICINE

## 2022-08-04 PROCEDURE — 95800 SLP STDY UNATTENDED: CPT

## 2022-08-08 DIAGNOSIS — G47.33 OSA (OBSTRUCTIVE SLEEP APNEA): Primary | ICD-10-CM

## 2022-09-20 ENCOUNTER — TELEPHONE (OUTPATIENT)
Dept: SLEEP MEDICINE | Facility: HOSPITAL | Age: 47
End: 2022-09-20

## 2022-09-20 NOTE — TELEPHONE ENCOUNTER
Caller: Gloria Smith    Relationship to patient: Self    Best call back number: [phone]    Type of visit: MYCHART VISIT    Requested date: 11/09/22 @9AM    Additional notes:PATIENT WOULD LIKE A CALL BACK CONFIRMING CHANGE OF VISIT TYPE.

## 2022-09-27 NOTE — PROGRESS NOTES
CTS Progress Note      POD 1 s/p Left VATS wedge resection    Subjective  Sore, no complaints otherwise    Objective    Physical Exam:   Vital Signs   Temp:  [97.7 °F (36.5 °C)-98.4 °F (36.9 °C)] 98.3 °F (36.8 °C)  Heart Rate:  [] 68  Resp:  [16-20] 20  BP: (104-151)/() 109/66   GEN: NAD   CV: RRR    RESP: unlabored     CT Output:294mls, no air leak     Results       Results from last 7 days  Lab Units 11/09/18  0440   WBC 10*3/mm3 5.93   HEMOGLOBIN g/dL 12.0   HEMATOCRIT % 36.7   PLATELETS 10*3/mm3 190       Results from last 7 days  Lab Units 11/09/18  0440   SODIUM mmol/L 132   POTASSIUM mmol/L 4.3   CHLORIDE mmol/L 98*   CO2 mmol/L 25.0   BUN mg/dL 6*   CREATININE mg/dL 0.47*   GLUCOSE mg/dL 138*   CALCIUM mg/dL 8.8       Results from last 7 days  Lab Units 11/07/18  1652   INR  0.92       CXR:IMPRESSION:  Chest as above. Left chest tube in place with no definite  pneumothorax.            Assessment/Plan   POD#1 s/p VATLeft upper and lower lobe wedge resections  Expected recovery    Pulmonary infiltrates, nodular    Lung nodule        Plan   Transfer to telemetry  Continue chest tubes to suction, D/C when output under 200 cc in 24 hours  Likely chest tube out in 24-48 hours and subsequently home  Await final pathology                   [employer] Counseling:  I discussed with the patient the risks of mycophenolate mofetil including but not limited to infection/immunosuppression, GI upset, hypokalemia, hypercholesterolemia, bone marrow suppression, lymphoproliferative disorders, malignancy, GI ulceration/bleed/perforation, colitis, interstitial lung disease, kidney failure, progressive multifocal leukoencephalopathy, and birth defects.  The patient understands that monitoring is required including a baseline creatinine and regular CBC testing. In addition, patient must alert us immediately if symptoms of infection or other concerning signs are noted.

## 2022-11-14 ENCOUNTER — TELEMEDICINE (OUTPATIENT)
Dept: SLEEP MEDICINE | Facility: CLINIC | Age: 47
End: 2022-11-14

## 2022-11-14 VITALS — WEIGHT: 212 LBS | BODY MASS INDEX: 36.39 KG/M2

## 2022-11-14 DIAGNOSIS — G47.33 OBSTRUCTIVE SLEEP APNEA, ADULT: Primary | ICD-10-CM

## 2022-11-14 PROCEDURE — 99213 OFFICE O/P EST LOW 20 MIN: CPT | Performed by: NURSE PRACTITIONER

## 2022-11-14 NOTE — PROGRESS NOTES
Sleep Clinic Video Visit Follow Up Note    You have chosen to receive care through a telehealth visit.  Do you consent to use an audio connection for your medical care today? Yes       Chief Complaint  Follow up pap compliance    Subjective     History of Present Illness (from previous encounter on 7/7/2022):  Gloria Smith is a 46 y.o. female who presents for fatigue.  She has arthritis, chronic pain, Crohn's disease, and hypertension.  She has had difficulty with weight gain, insomnia, vivid dreams, and wakes herself at night gasping for breath.  She drinks approximately 3 cups of coffee per day, 2-3 teeth, and 3 Cokes zeros.  She did also smokes quarter pack of cigarettes per day followed by an e-cigarette.  She has a family history of sleep apnea in her father.  (End copied text)    Interval History:  Gloria Smith is a 47 y.o. female returns for follow up and compliance of CPAP therapy. The patient was last seen on 7/7/22. Overall the patient feels good with regard to therapy. The device appears to be appear to be working appropriately.  On average the patient sleeps 8 hours per night. The patient wake 1 time per night. The patient reports the following changes to their medical and medication history since they were last seen: None    -Home sleep test was obtained on 8/5/2022 revealing severe obstructive sleep apnea.  PAP therapy was initiated.    Further details are as follows:    [place] Scale is: 6/24    Weight:  Current Weight: 212 lb      The patient's relevant past medical, surgical, family, and social history reviewed and updated in Epic as appropriate.    PMH:    Past Medical History:   Diagnosis Date   • Anxiety    • Arthritis    • BRCA gene positive    • Constipation    • Crohn's disease (HCC)    • Diarrhea    • Fatty liver    • GERD (gastroesophageal reflux disease)    • History of transfusion    • Hypertension    • Short of breath on exertion    • TB (tuberculosis) 08/2018    quantiferon gold  TB POSITIVE AUG 2018 - 3 [employer]G [employer] CULTURES DONE  - NO ISOLATION REQUIRED   • Wears glasses S     Past Surgical History:   Procedure Laterality Date   • ANKLE SURGERY Left     orif, fracture, later removal hardware   • APPENDECTOMY     • BILATERAL BREAST REDUCTION     • BREAST BIOPSY Left    • BRONCHOSCOPY N/A 9/7/2018    Procedure: BRONCHOSCOPY;  Surgeon: Christy Herrera MD;  Location:  [facility];  Service: Pulmonary   • BRONCHOSCOPY N/A 11/8/2018    Procedure: BRONCHOSCOPY;  Surgeon: Shaun Oates MD;  Location:  [facility];  Service: Cardiothoracic   • CHOLECYSTECTOMY     • COLON RESECTION Bilateral 11/6/2017    Procedure: ILEOCECOTOMY, BILATERAL SALPINGO OOPHERECTOMY;  Surgeon: Ajith Mcduffie MD;  Location:  [facility];  Service:    • COLONOSCOPY      5-2017   • ENDOSCOPY     • EXPLORATORY LAPAROTOMY  1997   • OOPHORECTOMY Bilateral    • THORACOSCOPY Left 11/8/2018    Procedure: LEFT THORACOSCOPY VIDEO ASSISTED WITH A LUNG BIOPSY;  Surgeon: Shaun Oates MD;  Location:  [facility];  Service: Cardiothoracic   • TONSILLECTOMY     • WISDOM TOOTH EXTRACTION       OB History    No obstetric history on file.       No Known Allergies    MEDS:  Prior to Admission medications    Medication Sig Start Date End Date Taking? Authorizing Provider   escitalopram (LEXAPRO) 10 MG tablet Take 10 mg by mouth Daily.    ProviderMichelle MD   estradiol (VIVELLE-DOT) 0.025 MG/24HR patch estradiol 0.025 mg/24 hr semiweekly transdermal patch   Apply 1 patch twice a week by transdermal route.    Michelle Barrett MD   ibuprofen (ADVIL,MOTRIN) 400 MG tablet Take 400 mg by mouth Every 6 (Six) Hours As Needed for Mild Pain .    Michelle Barrett MD   InFLIXimab (REMICADE IV) Infuse 1 Bag. into a venous catheter Take As Directed. TWICE MONTHLY    Michelle Barrett MD   loratadine (CLARITIN) 10 MG tablet Take 10 mg by mouth Daily As Needed.    Michelle Barrett MD   metoprolol succinate XL (TOPROL-XL) 25 MG 24  hr tablet Take 25 mg by mouth Daily.    Provider, MD Michelle   oxyCODONE-acetaminophen (PERCOCET) 7.5-325 MG per tablet Take 1 tablet by mouth Every 6 (Six) Hours As Needed for Severe Pain . 11/13/18   Jeffery Spencer MD   predniSONE (DELTASONE) 10 MG tablet Take 4 tabs daily x 3 days, then take 3 tabs daily x 3 days, then take 2 tabs daily x 3 days, then take 1 tab daily x 3 days 2/1/19   Christy Herrera MD   predniSONE (DELTASONE) 5 MG tablet Take 5 mg by mouth Daily. 8/8/18   Michelle Barrett MD   progesterone (PROMETRIUM) 100 MG capsule Take 100 mg by mouth Daily. 8/20/18   Michelle Barrett MD         FH:  Family History   Problem Relation Age of Onset   • Breast cancer Mother    • Hypertension Father    • Breast cancer Maternal Grandmother    • Ovarian cancer Maternal Grandmother        Objective   Vital Signs:  Wt 96.2 kg (212 lb)   BMI 36.39 kg/m²             Physical Exam        CPAP Report:  AHI: 3.8/h   Days of Usage: 65/83 (70%)  95th Percentile Pressure: 12.5 cm H2O  Number of Days Greater than 4 hours: 60/83 (72%)  Settings: CPAP-AutoSet-minimum pressure 5 cm H2O, maximum pressure 20 cm H2O, EPR full-time, EPR level 3    Result Review :              Assessment and Plan  Gloria Smith is a 47 y.o. femalewho presents for follow-up and compliance of PAP therapy.  Patient is in full compliance with total usage at 70%, and greater than 4-hour usage at 72%.  Her sleep apnea is well controlled with an AHI of 3.6/H.  I have encouraged increase usage.  She was recently on vacation and her compliance numbers do not fully reflect her usage.  She feels much improved when using the device and wishes to continue to use it I will refill supplies and the patient return for follow-up in compliance in 1 year or sooner should she have further questions or concerns.    Diagnoses and all orders for this visit:    1. Obstructive sleep apnea, adult (Primary)  -     PAP Therapy                   The  patient continues to use and benefit from CPAP therapy.    1. The patient was counseled regarding multimodal approach with healthy nutrition, healthy sleep, regular physical activity, social activities, counseling, and medications. Encouraged to practice lateral sleep position. Avoid alcohol and sedatives close to bedtime.     2.  We will refill supplies x1 year.  Return to clinic 1 year or sooner if symptoms warrant.  Patient gave verbal consent today for video visit. I have reviewed the results of my evaluation and impression and discussed my recommendations in detail with the patient.    Follow Up  No follow-ups on file.  Patient was given instructions and counseling regarding her condition or for health maintenance advice. Please see specific information pulled into the AVS if appropriate.       RASHAD Flor, [facility]-BC  Pulmonology, Critical Care, and Sleep Medicine  Electronically signed by RASHAD Swanson, 11/14/22, 12:37 PM EST.

## 2024-01-22 ENCOUNTER — TELEPHONE (OUTPATIENT)
Dept: SLEEP MEDICINE | Facility: CLINIC | Age: 49
End: 2024-01-22

## 2024-01-22 NOTE — TELEPHONE ENCOUNTER
Caller: Gloria Smith    Relationship to patient: Self    Best call back number: [phone]    Chief complaint: COMPLIANCE     Type of visit: FOLLOW UP SLEEP MYCHART     Requested date: ASAP     If rescheduling, when is the original appointment: 04/05/24     Additional notes: PT IS STARTING NEW POSITION ON 02/05/24 AND WOULD LIKE AN APPT PRIOR TO IF POSSIBLE AS SHE WILL NOT HAVE TIME TO TAKE OFF. PLEASE CALL PT AND ADV SOONEST DATE POSSIBLE.

## 2024-01-26 ENCOUNTER — TELEMEDICINE (OUTPATIENT)
Dept: SLEEP MEDICINE | Facility: CLINIC | Age: 49
End: 2024-01-26
Payer: COMMERCIAL

## 2024-01-26 VITALS — HEIGHT: 64 IN | WEIGHT: 215 LBS | BODY MASS INDEX: 36.7 KG/M2

## 2024-01-26 DIAGNOSIS — G47.33 OBSTRUCTIVE SLEEP APNEA, ADULT: Primary | ICD-10-CM

## 2024-01-26 DIAGNOSIS — F51.04 PSYCHOPHYSIOLOGICAL INSOMNIA: ICD-10-CM

## 2024-01-26 PROCEDURE — 99213 OFFICE O/P EST LOW 20 MIN: CPT | Performed by: NURSE PRACTITIONER

## 2024-01-26 RX ORDER — TRAZODONE HYDROCHLORIDE 50 MG/1
50 TABLET ORAL NIGHTLY
Qty: 30 TABLET | Refills: 11 | Status: SHIPPED | OUTPATIENT
Start: 2024-01-26

## 2024-01-26 NOTE — PROGRESS NOTES
Chief Complaint:   Chief Complaint   Patient presents with    Follow-up       HPI:    Gloria Smith is a 48 y.o. female here for follow-up of sleep apnea.  Patient was last seen 11/14/2022.  Patient has not used CPAP for approximately 2 months due to illness and other issues.  Patient notes she does feel better when wearing and has recently restarted.  She does only get 4 to 5 hours of sleep nightly and is not rested upon awakening.  It does take her upwards of 2 hours to go to sleep and does toss and turn frequently.  Patient has an [place] score of 8/24.  We did speak today about trying a noncontrolled substance for sleep and patient does agree with this plan of care we will move forward with low-dose trazodone and she will increase CPAP use.        Current medications are:   Current Outpatient Medications:     loratadine (CLARITIN) 10 MG tablet, Take 10 mg by mouth Daily As Needed., Disp: , Rfl:     metoprolol succinate XL (TOPROL-XL) 25 MG 24 hr tablet, Take 25 mg by mouth Daily., Disp: , Rfl:     traZODone (DESYREL) 50 MG tablet, Take 1 tablet by mouth Every Night., Disp: 30 tablet, Rfl: 11  No current facility-administered medications for this visit.    Facility-Administered Medications Ordered in Other Visits:     Chlorhexidine Gluconate Cloth 2 % pads 1 application, 1 application , Topical, Q12H PRN, Antony Horton PA.      The patient's relevant past medical, surgical, family and social history were reviewed and updated in Epic as appropriate.       Review of Systems   Eyes:  Positive for visual disturbance.   Respiratory:  Positive for apnea.    Musculoskeletal:  Positive for back pain, myalgias and neck pain.   Allergic/Immunologic: Positive for environmental allergies and immunocompromised state.   Psychiatric/Behavioral:  Positive for sleep disturbance.    All other systems reviewed and are negative.        Objective:    Physical Exam  Constitutional:       Appearance: Normal appearance.   HENT:       Head: Normocephalic and atraumatic.      Mouth/Throat:      Comments: Class 4 airway  Pulmonary:      Effort: Pulmonary effort is normal. No respiratory distress.   Neurological:      Mental Status: She is alert and oriented to person, place, and time.   Psychiatric:         Mood and Affect: Mood normal.         Behavior: Behavior normal.         Thought Content: Thought content normal.         Judgment: Judgment normal.         CPAP Report    20/30 days of use  Greater than 4-hour use 63%  Settings 5-20  AHI of 3.8  The patient continues to use and benefit from CPAP therapy.    ASSESSMENT/PLAN    Diagnoses and all orders for this visit:    1. Obstructive sleep apnea, adult (Primary)  -     PAP Therapy    2. Psychophysiological insomnia  -     traZODone (DESYREL) 50 MG tablet; Take 1 tablet by mouth Every Night.  Dispense: 30 tablet; Refill: 11        Counseled patient regarding multimodal approach with healthy nutrition, healthy sleep, regular physical activity, social activities, counseling, and medications. Encouraged to practice lateral sleep position. Avoid alcohol and sedatives close to bedtime.  Refill supplies x 1 year.  Will do an appointment in 6 to 8 weeks to reassess trazodone she may reach out to me anytime before then if needed.    The patient is located in [facility]. The patient presents today for telehealth service.  This service was conducted via audio/video technology through a secure [employer] video visit connection through Epic.  This provider is located in [facility].  Patient stated they are in a secure environment for the session.  Patient's condition being diagnosed/treated is appropriate for telemedicine.  The provider identified himself as well as his credentials.  The patient, and/or patient's guardian, consent to be seen remotely, and when consent is given they understanding that the consent allows for patient identifiable information to be sent to a third-party as needed.   They may refuse to be seen remotely at any time.  The electronic data is encrypted and password protected, and the patient and/or guardian has been advised of the potential risk to privacy not withstanding such measures.  Patient identifiers used: Name and date of birth.   I have reviewed the results of my evaluation and impression and discussed my recommendations in detail with the patient.      Signed by  RASHAD Mei    January 26, 2024      CC: Shaun Taylor MD         No ref. provider found

## 2025-01-03 DIAGNOSIS — G47.33 OSA (OBSTRUCTIVE SLEEP APNEA): Primary | ICD-10-CM

## 2025-02-13 DIAGNOSIS — F51.04 PSYCHOPHYSIOLOGICAL INSOMNIA: ICD-10-CM

## 2025-02-13 RX ORDER — TRAZODONE HYDROCHLORIDE 50 MG/1
50 TABLET, FILM COATED ORAL NIGHTLY
Qty: 30 TABLET | Refills: 11 | OUTPATIENT
Start: 2025-02-13

## 2025-02-24 ENCOUNTER — TRANSCRIBE ORDERS (OUTPATIENT)
Dept: ADMINISTRATIVE | Facility: HOSPITAL | Age: 50
End: 2025-02-24
Payer: COMMERCIAL

## 2025-02-24 DIAGNOSIS — K50.90 CROHN'S DISEASE WITHOUT COMPLICATION, UNSPECIFIED GASTROINTESTINAL TRACT LOCATION: Primary | ICD-10-CM

## 2025-03-21 ENCOUNTER — HOSPITAL ENCOUNTER (OUTPATIENT)
Dept: CT IMAGING | Facility: HOSPITAL | Age: 50
Discharge: HOME | End: 2025-03-21
Admitting: COLON & RECTAL SURGERY
Payer: COMMERCIAL

## 2025-03-21 DIAGNOSIS — K50.90 CROHN'S DISEASE WITHOUT COMPLICATION, UNSPECIFIED GASTROINTESTINAL TRACT LOCATION: ICD-10-CM

## 2025-03-21 PROCEDURE — 25510000001 IOPAMIDOL PER 1 ML: Performed by: COLON & RECTAL SURGERY

## 2025-03-21 PROCEDURE — 74177 CT ABD & PELVIS W/CONTRAST: CPT

## 2025-03-21 RX ORDER — IOPAMIDOL 755 MG/ML
150 INJECTION, SOLUTION INTRAVASCULAR
Status: COMPLETED | OUTPATIENT
Start: 2025-03-21 | End: 2025-03-21

## 2025-03-21 RX ADMIN — IOPAMIDOL 150 ML: 755 INJECTION, SOLUTION INTRAVENOUS at 09:35

## (undated) DEVICE — SENSR O2 OXIMAX FNGR A/ 18IN NONSTR

## (undated) DEVICE — SINGLE USE BIOPSY VALVE MAJ-210: Brand: SINGLE USE BIOPSY VALVE (STERILE)

## (undated) DEVICE — ADAPT ST INFUS ADMIN SYR 70IN

## (undated) DEVICE — GLV SURG SENSICARE MICRO PF LF 8.5 STRL

## (undated) DEVICE — APPL COTN TP PLSTC 6IN STRL LF PK/2

## (undated) DEVICE — ELECTRD BLD EDGE/INSUL1P 2.4X5.1MM STRL

## (undated) DEVICE — BOWL UTIL STRL 32OZ

## (undated) DEVICE — SOL LR 1000ML

## (undated) DEVICE — PK THORACOTOMY 10

## (undated) DEVICE — ANTIBACTERIAL UNDYED BRAIDED (POLYGLACTIN 910), SYNTHETIC ABSORBABLE SURGICAL SUTURE: Brand: COATED VICRYL

## (undated) DEVICE — VISUALIZATION SYSTEM: Brand: CLEARIFY

## (undated) DEVICE — ST EXT MICROBORE FIX M LL 38IN

## (undated) DEVICE — NDL HYPO ECLPS SFTY 22G 1 1/2IN

## (undated) DEVICE — SPNG GZ WOVN 4X4IN 12PLY 10/BX STRL

## (undated) DEVICE — ST NERV BLCK CONT CONTIPLEX ECHO CLSD 18G 4IN

## (undated) DEVICE — SOL IRR NACL 0.9PCT BT 1000ML

## (undated) DEVICE — CANN NASL CO2 DIVIDED A/

## (undated) DEVICE — TRAP,MUCUS SPECIMEN,40CC: Brand: MEDLINE

## (undated) DEVICE — SAFESECURE,SECUREMENT,FOLEY CATH,STERILE: Brand: MEDLINE

## (undated) DEVICE — SUT VIC 2/0 CT2 27IN J269H

## (undated) DEVICE — SPEC CONT WIDE MOUTH 3OZ 400/CS 20 CS/SK: Brand: MEDEGEN MEDICAL PRODUCTS, LLC

## (undated) DEVICE — SUT PDS 1 CTX 36IN VIO PDP371T

## (undated) DEVICE — TOTAL TRAY, 16FR 10ML SIL FOLEY, URN: Brand: MEDLINE

## (undated) DEVICE — ELECTRD BLD EXT EDGE/INSUL 6IN

## (undated) DEVICE — CLTH CLENS READYCLEANSE PERI CARE PK/5

## (undated) DEVICE — LEX GENERAL ABDOMINAL SPLIT: Brand: MEDLINE INDUSTRIES, INC.

## (undated) DEVICE — COVADERM PLUS: Brand: DEROYAL

## (undated) DEVICE — SUCTION CANISTER, 2500CC, RIGID: Brand: DEROYAL

## (undated) DEVICE — SYR SLP TP 10ML DISP

## (undated) DEVICE — SYR SLPTP 20CC

## (undated) DEVICE — MEDI-VAC YANKAUER SUCTION HANDLE W/BULBOUS TIP: Brand: CARDINAL HEALTH

## (undated) DEVICE — ENDOPOUCH RETRIEVER SPECIMEN RETRIEVAL BAGS: Brand: ENDOPOUCH RETRIEVER

## (undated) DEVICE — 2000CC GUARDIAN II: Brand: GUARDIAN

## (undated) DEVICE — SUT VIC 12X27 D8116 BX/12

## (undated) DEVICE — SUT ETHIB 1 CT1 30IN  X425H

## (undated) DEVICE — SINGLE USE SUCTION VALVE MAJ-209: Brand: SINGLE USE SUCTION VALVE (STERILE)

## (undated) DEVICE — CVR HNDL LT SURG ACCSSRY BLU STRL

## (undated) DEVICE — SUT MNCRYL PLS ANTIB UD 4/0 PS2 18IN

## (undated) DEVICE — MAGNETIC DRAPE: Brand: DEVON

## (undated) DEVICE — CONN STD FOR/O2 TBG

## (undated) DEVICE — SIDESTREAM™ HIGH-EFFICIENCY NEBULIZER: Brand: AIRLIFE™

## (undated) DEVICE — SKIN AFFIX SURG ADHESIVE 72/CS 0.55ML: Brand: MEDLINE

## (undated) DEVICE — GLV SURG SENSICARE MICRO PF LF 7 STRL

## (undated) DEVICE — TAPE MICROFM 2IN LF

## (undated) DEVICE — SYR LL TP 10ML STRL

## (undated) DEVICE — DUAL LUMEN STOMACH TUBE,ANTI-REFLUX VALVE: Brand: SALEM SUMP

## (undated) DEVICE — TUBING, SUCTION, 1/4" X 10', STRAIGHT: Brand: MEDLINE

## (undated) DEVICE — CANNULA,OXY,ADULT,SUPERSOFT,W/7'TUB,UC: Brand: MEDLINE

## (undated) DEVICE — MEDI-VAC NON-CONDUCTIVE SUCTION TUBING: Brand: CARDINAL HEALTH